# Patient Record
Sex: MALE | Race: WHITE | Employment: OTHER | URBAN - METROPOLITAN AREA
[De-identification: names, ages, dates, MRNs, and addresses within clinical notes are randomized per-mention and may not be internally consistent; named-entity substitution may affect disease eponyms.]

---

## 2019-03-01 ENCOUNTER — HOSPITAL ENCOUNTER (OUTPATIENT)
Dept: LAB | Age: 79
Discharge: HOME | End: 2019-03-01
Payer: MEDICARE

## 2019-03-01 ENCOUNTER — OFFICE VISIT (OUTPATIENT)
Dept: ENDOCRINOLOGY | Age: 79
End: 2019-03-01

## 2019-03-01 VITALS
HEART RATE: 55 BPM | TEMPERATURE: 96.8 F | DIASTOLIC BLOOD PRESSURE: 68 MMHG | HEIGHT: 67 IN | WEIGHT: 189 LBS | OXYGEN SATURATION: 96 % | BODY MASS INDEX: 29.66 KG/M2 | RESPIRATION RATE: 16 BRPM | SYSTOLIC BLOOD PRESSURE: 124 MMHG

## 2019-03-01 DIAGNOSIS — I10 ESSENTIAL HYPERTENSION: ICD-10-CM

## 2019-03-01 DIAGNOSIS — E78.2 MIXED HYPERLIPIDEMIA: ICD-10-CM

## 2019-03-01 DIAGNOSIS — E11.65 TYPE 2 DIABETES MELLITUS WITH HYPERGLYCEMIA, WITHOUT LONG-TERM CURRENT USE OF INSULIN (HCC): Primary | ICD-10-CM

## 2019-03-01 LAB
GLUCOSE POC: 134 MG/DL
HBA1C MFR BLD HPLC: 6.8 %

## 2019-03-01 PROCEDURE — 82043 UR ALBUMIN QUANTITATIVE: CPT

## 2019-03-01 PROCEDURE — 83721 ASSAY OF BLOOD LIPOPROTEIN: CPT

## 2019-03-01 PROCEDURE — 36415 COLL VENOUS BLD VENIPUNCTURE: CPT

## 2019-03-01 PROCEDURE — 80048 BASIC METABOLIC PNL TOTAL CA: CPT

## 2019-03-01 RX ORDER — AMOXICILLIN 500 MG
1 CAPSULE ORAL 3 TIMES DAILY
COMMUNITY
End: 2021-04-09

## 2019-03-01 RX ORDER — METFORMIN HYDROCHLORIDE 500 MG/1
1000 TABLET, FILM COATED, EXTENDED RELEASE ORAL 2 TIMES DAILY
COMMUNITY
End: 2019-07-12 | Stop reason: ALTCHOICE

## 2019-03-01 RX ORDER — BLOOD-GLUCOSE METER
KIT MISCELLANEOUS
Refills: 2 | COMMUNITY
Start: 2018-12-26 | End: 2019-04-24 | Stop reason: SDUPTHER

## 2019-03-01 RX ORDER — ERGOCALCIFEROL 1.25 MG/1
5000 CAPSULE ORAL
COMMUNITY

## 2019-03-01 RX ORDER — LISINOPRIL AND HYDROCHLOROTHIAZIDE 12.5; 2 MG/1; MG/1
TABLET ORAL
Refills: 0 | COMMUNITY
Start: 2019-01-22 | End: 2019-04-24 | Stop reason: SDUPTHER

## 2019-03-01 RX ORDER — DICLOFENAC SODIUM 75 MG/1
TABLET, DELAYED RELEASE ORAL
Refills: 1 | COMMUNITY
Start: 2019-01-10 | End: 2021-03-16 | Stop reason: ALTCHOICE

## 2019-03-01 RX ORDER — EZETIMIBE 10 MG/1
TABLET ORAL
Refills: 1 | COMMUNITY
Start: 2018-12-31 | End: 2021-03-16

## 2019-03-01 RX ORDER — METOPROLOL SUCCINATE 200 MG/1
TABLET, EXTENDED RELEASE ORAL
Refills: 0 | COMMUNITY
Start: 2019-01-22 | End: 2019-04-24 | Stop reason: SDUPTHER

## 2019-03-01 NOTE — PROGRESS NOTES
Ankit Oliveros is a [de-identified] y.o. male here for Chief Complaint Patient presents with  New Patient  
  referred by Dr. Bell Harris for DM Functional glucose monitor and record keeping system? - yes Eye exam within last year? - [facility] Foot exam within last year? - due 1. Have you been to the ER, urgent care clinic since your last visit? Hospitalized since your last visit? -n/a 2. Have you seen or consulted any other health care providers outside of the [address] [place] [address] since your last visit?   Include any pap smears or colon screening.-n/a

## 2019-03-01 NOTE — PROGRESS NOTES
[facility] DIABETES AND ENDOCRINOLOGY Vinicius Mcmahon MD 
 
    [address] [phone] Fax [phone] Patient Information Date:3/3/2019 Name : Марина Edmonds [de-identified] y.o.    
YOB: 1940 Chief Complaint Patient presents with  New Patient DM History of Present Illness: Марина Edmonds is a [de-identified] y.o. male here for initial visit of  Type 2 Diabetes Mellitus. Self-referred for evaluation management of type 2 diabetes mellitus. He was managed by Dr. Michelle Marley in the past 
He is on metformin, was on insulin which he has discontinued after changing the diet. Diabetes was diagnosed in 2015, complains of tingling, pain in the feet. He wants to know if the neuropathy pain he has is directly related to the diabetes to see if we will qualify for more  benefits. He is checking the blood glucose twice daily, maintaining a very good log, no hypoglycemia Due to arthralgia, pain in the feet activity is very limited. Eats 3 meals a day, no particular diet, drinks diet drinks. No cough claudication, chest pain, shortness of breath He has underlying coronary artery disease Weight has been stable Wt Readings from Last 3 Encounters:  
03/01/19 189 lb (85.7 kg) BP Readings from Last 3 Encounters:  
03/01/19 124/68 Past Medical History:  
Diagnosis Date  Cataract  Coronary artery disease  Hyperlipidemia  Hypertension  Neuropathy  Type 2 diabetes mellitus ([facility]  Vitamin D deficiency Current Outpatient Medications Medication Sig  
 ezetimibe (ZETIA) 10 mg tablet TK 1 T PO QD  
 metoprolol succinate (TOPROL-XL) 200 mg XL tablet TK 1 T PO QD  
 lisinopril-hydroCHLOROthiazide (PRINZIDE, ZESTORETIC) 20-12.5 mg per tablet TK 1 T PO QD  
 diclofenac EC (VOLTAREN) 75 mg EC tablet TK 1 T PO BID  FREESTYLE LITE STRIPS strip USE TO TEST BLOOD SUGAR QID  
  metFORMIN (GLUMETZA ER) 500 mg TG24 24 hour tablet Take 1,000 mg by mouth two (2) times a day.  ergocalciferol (VITAMIN D2) 50,000 unit capsule Take 50,000 Units by mouth every seven (7) days.  alip acid/biotin/mins16/herb91 (BLOOD SUGAR BALANCE PO) Take 1 Tab by mouth two (2) times a day.  OTHER Take 1 Tab by mouth two (2) times a day. Proflexoral  
 fish oil-omega-3 fatty acids (FISH OIL) 300-1,000 mg cap Take 1 Cap by mouth three (3) times daily.  OTHER Take 1 Tab by mouth daily. GS-85 No current facility-administered medications for this visit. No Known Allergies Review of Systems:  All 10 systems reviewed and are negative other than mentioned in HPI Physical Examination: 
 Blood pressure 124/68, pulse (!) 55, temperature 96.8 °F (36 °C), temperature source Oral, resp. rate 16, height 5' 7\" (1.702 m), weight 189 lb (85.7 kg), SpO2 96 %. Estimated body mass index is 29.6 kg/m² as calculated from the following: 
  Height as of this encounter: 5' 7\" (1.702 m). -   Weight as of this encounter: 189 lb (85.7 kg). - General: pleasant, no distress, good eye contact 
- HEENT: no pallor, no periorbital edema, EOMI 
- Neck: supple, no thyromegaly, no nodules - Cardiovascular: regular,  normal S1 and S2, no murmurs - Respiratory: clear to auscultation bilaterally - Gastrointestinal: soft, nontender, nondistended,  BS + 
- Musculoskeletal: no proximal muscle weakness in upper or lower extremities - Integumentary: no acanthosis nigricans,no edema,  
- Neurological: alert and oriented - Psychiatric: normal mood and affect 
- Skin: color, texture, turgor normal.  
 
Diabetic foot exam: March 2019 Left Foot: 
 Visual Exam: callous - Great toe Pulse DP: 1+ (weak) Filament test: reduced sensation Vibratory sensation: diminished Right Foot: 
 Visual Exam: normal  
 Pulse DP: 1+ (weak) Filament test: reduced sensation Vibratory sensation: diminished Data Reviewed: Lab Results Component Value Date/Time Glucose 119 (H) 03/01/2019 02:13 PM  
 Glucose  03/01/2019 01:31 PM  
 Microalb/Creat ratio (ug/mg creat.) 9.4 03/01/2019 02:13 PM  
 LDL,Direct 110 (H) 03/01/2019 02:13 PM  
 Creatinine 1.57 (H) 03/01/2019 02:13 PM  
  
Lab Results Component Value Date/Time GFR est non-AA 41 (L) 03/01/2019 02:13 PM  
 GFR est AA 47 (L) 03/01/2019 02:13 PM  
 Creatinine 1.57 (H) 03/01/2019 02:13 PM  
 BUN 27 03/01/2019 02:13 PM  
 Sodium 142 03/01/2019 02:13 PM  
 Potassium 5.5 (H) 03/01/2019 02:13 PM  
 Chloride 109 (H) 03/01/2019 02:13 PM  
 CO2 22 03/01/2019 02:13 PM  
 
 
Assessment/Plan: 1. Type 2 diabetes mellitus with hyperglycemia, without long-term current use of insulin (HCC) 2. Essential hypertension 3. Mixed hyperlipidemia 1. Type 2 Diabetes Mellitus Lab Results Component Value Date/Time Hemoglobin A1c (POC) 6.8 03/01/2019 01:31 PM  
 
Controlled diabetes, continue metformin. Check blood glucose once daily Diabetic issues reviewed : glycemic goals , written exchange diet given, low carbohydrate diet, weight control , home glucose monitoring emphasized,  hypoglycemia management and long term diabetic complications discussed. FLU annually ,Pneumovax ,aspirin daily,annual eye exam,microalbumin 2. HTN : Continue current therapy 3. Hyperlipidemia : Continue statin. 4.  CAD No calf claudication 5. Peripheral neuropathy: He wants to know conclusively if neuropathy is related to the diabetes, referto neurology There are no Patient Instructions on file for this visit. Follow-up Disposition: 
Return in about 4 months (around 7/1/2019). Thank you for allowing me to participate in the care of this patient. Nader Roche MD 
 
 
Patient verbalized understanding Voice-recognition software was used to generate this report, which may result in some phonetic-based errors in the grammar and contents.   Even though attempts were made to correct all the mistakes, some may have been missed and remained in the body of the report.

## 2019-03-02 LAB
ALBUMIN/CREAT UR: 9.4 MG/G CREAT (ref 0–30)
BUN SERPL-MCNC: 27 MG/DL (ref 8–27)
BUN/CREAT SERPL: 17 (ref 10–24)
CALCIUM SERPL-MCNC: 9.5 MG/DL (ref 8.6–10.2)
CHLORIDE SERPL-SCNC: 109 MMOL/L (ref 96–106)
CO2 SERPL-SCNC: 22 MMOL/L (ref 20–29)
CREAT SERPL-MCNC: 1.57 MG/DL (ref 0.76–1.27)
CREAT UR-MCNC: 478.6 MG/DL
GLUCOSE SERPL-MCNC: 119 MG/DL (ref 65–99)
INTERPRETATION: NORMAL
LDLC SERPL DIRECT ASSAY-MCNC: 110 MG/DL (ref 0–99)
Lab: NORMAL
MICROALBUMIN UR-MCNC: 44.8 UG/ML
POTASSIUM SERPL-SCNC: 5.5 MMOL/L (ref 3.5–5.2)
SODIUM SERPL-SCNC: 142 MMOL/L (ref 134–144)

## 2019-03-03 PROBLEM — E78.2 MIXED HYPERLIPIDEMIA: Status: ACTIVE | Noted: 2019-03-03

## 2019-03-03 PROBLEM — I10 ESSENTIAL HYPERTENSION: Status: ACTIVE | Noted: 2019-03-03

## 2019-03-03 PROBLEM — E11.65 TYPE 2 DIABETES MELLITUS WITH HYPERGLYCEMIA, WITHOUT LONG-TERM CURRENT USE OF INSULIN (HCC): Status: ACTIVE | Noted: 2019-03-03

## 2019-04-24 DIAGNOSIS — E11.65 TYPE 2 DIABETES MELLITUS WITH HYPERGLYCEMIA, WITHOUT LONG-TERM CURRENT USE OF INSULIN (HCC): Primary | ICD-10-CM

## 2019-04-24 RX ORDER — BLOOD-GLUCOSE METER
KIT MISCELLANEOUS
Qty: 300 STRIP | Refills: 3 | Status: SHIPPED | OUTPATIENT
Start: 2019-04-24 | End: 2019-05-24 | Stop reason: SDUPTHER

## 2019-04-24 RX ORDER — METOPROLOL SUCCINATE 200 MG/1
200 TABLET, EXTENDED RELEASE ORAL DAILY
Qty: 90 TAB | Refills: 3 | Status: SHIPPED | OUTPATIENT
Start: 2019-04-24 | End: 2019-11-18 | Stop reason: SDUPTHER

## 2019-04-24 RX ORDER — LISINOPRIL AND HYDROCHLOROTHIAZIDE 12.5; 2 MG/1; MG/1
TABLET ORAL
Qty: 90 TAB | Refills: 3 | Status: SHIPPED | OUTPATIENT
Start: 2019-04-24 | End: 2020-04-27

## 2019-04-24 NOTE — TELEPHONE ENCOUNTER
Informed pt that we will send test strips to pharmacy and that he would have to get BP meds from PCP. Pt stated his old Endo was Prescribing his BP meds not his PCP. Pt is running out of meds in the next couple of days. Informed him Dr. Rosanne Cain is out of the office but I will see if covering physician will send it in. Pt verbalized understanding with no further questions or concerns at this time.

## 2019-05-14 RX ORDER — METFORMIN HYDROCHLORIDE 500 MG/1
TABLET, EXTENDED RELEASE ORAL
Qty: 360 TAB | Refills: 0 | Status: SHIPPED | OUTPATIENT
Start: 2019-05-14 | End: 2019-08-12 | Stop reason: SDUPTHER

## 2019-05-24 ENCOUNTER — TELEPHONE (OUTPATIENT)
Dept: ENDOCRINOLOGY | Age: 79
End: 2019-05-24

## 2019-05-24 DIAGNOSIS — E11.65 TYPE 2 DIABETES MELLITUS WITH HYPERGLYCEMIA, WITHOUT LONG-TERM CURRENT USE OF INSULIN (HCC): Primary | ICD-10-CM

## 2019-05-24 RX ORDER — INSULIN PUMP SYRINGE, 3 ML
EACH MISCELLANEOUS
Qty: 1 KIT | Refills: 0 | Status: SHIPPED | OUTPATIENT
Start: 2019-05-24 | End: 2022-04-05

## 2019-05-24 NOTE — TELEPHONE ENCOUNTER
Patient is almost out of test strips. He should be testing 2 times qday now down to 1 qday because he does not have enough strips.

## 2019-05-24 NOTE — TELEPHONE ENCOUNTER
Informed pt he has to call insurance to see what is covered. Pt states he does not know why Freestyle isn't covered and he doesn't know who to call. Informed pt that a generic Rx can be sent and will ask pharmacy to dispense what is covered. Pt verbalized understanding.

## 2019-07-11 NOTE — PROGRESS NOTES
Brielle Kamara is a [de-identified] y.o. male here for   Chief Complaint   Patient presents with    Diabetes       Functional glucose monitor and record keeping system? - yes  Eye exam within last year? - [facility]  Foot exam within last year? - on file    1. Have you been to the ER, urgent care clinic since your last visit? Hospitalized since your last visit? -no    2. Have you seen or consulted any other health care providers outside of the [address] [place] [address] since your last visit?   Include any pap smears or colon screening.-

## 2019-07-12 ENCOUNTER — OFFICE VISIT (OUTPATIENT)
Dept: ENDOCRINOLOGY | Age: 79
End: 2019-07-12

## 2019-07-12 VITALS
SYSTOLIC BLOOD PRESSURE: 139 MMHG | OXYGEN SATURATION: 97 % | BODY MASS INDEX: 30.13 KG/M2 | HEART RATE: 62 BPM | RESPIRATION RATE: 14 BRPM | TEMPERATURE: 95.6 F | WEIGHT: 192 LBS | DIASTOLIC BLOOD PRESSURE: 65 MMHG | HEIGHT: 67 IN

## 2019-07-12 DIAGNOSIS — E11.65 TYPE 2 DIABETES MELLITUS WITH HYPERGLYCEMIA, WITHOUT LONG-TERM CURRENT USE OF INSULIN (HCC): ICD-10-CM

## 2019-07-12 DIAGNOSIS — E78.2 MIXED HYPERLIPIDEMIA: ICD-10-CM

## 2019-07-12 DIAGNOSIS — I10 ESSENTIAL HYPERTENSION: ICD-10-CM

## 2019-07-12 DIAGNOSIS — E11.65 TYPE 2 DIABETES MELLITUS WITH HYPERGLYCEMIA, WITHOUT LONG-TERM CURRENT USE OF INSULIN (HCC): Primary | ICD-10-CM

## 2019-07-12 LAB
GLUCOSE POC: 163 MG/DL
HBA1C MFR BLD HPLC: 6.4 %

## 2019-07-12 NOTE — LETTER
7/13/19 Patient: Radha Qureshi YOB: 1940 Date of Visit: 7/12/2019 Chadwick Meneses, [address] [facility] [address] [address] [facility] [address] VIA Facsimile: [phone] Dear Chadwick Meneses MD, Thank you for referring Mr. Jerry Morton to [address] [address] for evaluation. My notes for this consultation are attached. If you have questions, please do not hesitate to call me. I look forward to following your patient along with you. Sincerely, Mary Gonzalez MD

## 2019-07-12 NOTE — PROGRESS NOTES
[facility]               Meghana Bravo MD        [address] 78 [phone] Fax [phone]               Patient Information  Date:7/12/2019  Name : Marcus Lozano [de-identified] y.o.     YOB: 1940               Chief Complaint   Patient presents with    Diabetes       History of Present Illness: Marcus Lozano is a [de-identified] y.o. male here for follow-up of  Type 2 Diabetes Mellitus. Self-referred for evaluation management of type 2 diabetes mellitus. He was managed by Dr. Bryan Cyr in the past  He is on metformin, was on insulin which he has discontinued after changing the diet. Diabetes was diagnosed in 2015, complains of tingling, pain in the feet. He wants to know if the neuropathy pain he has is directly related to the diabetes to see if we will qualify for more  benefits. Would like a referral to a neurologist      Eats 3 meals a day,    Wt Readings from Last 3 Encounters:   07/12/19 192 lb (87.1 kg)   03/01/19 189 lb (85.7 kg)       BP Readings from Last 3 Encounters:   07/12/19 139/65   03/01/19 124/68           Past Medical History:   Diagnosis Date    Cataract     Coronary artery disease     Hyperlipidemia     Hypertension     Neuropathy     [facility]     Vitamin D deficiency      Current Outpatient Medications   Medication Sig    Blood-Glucose Meter monitoring kit Test BID Dx Code: E11.65    glucose blood VI test strips (BLOOD GLUCOSE TEST) strip USE TO TEST BLOOD SUGAR BID. Dx code: E11.65    metFORMIN ER (GLUCOPHAGE XR) 500 mg tablet TAKE 2 TABLETS BY MOUTH TWICE DAILY.  lisinopril-hydroCHLOROthiazide (PRINZIDE, ZESTORETIC) 20-12.5 mg per tablet Take 1 tab by mouth daily    metoprolol succinate (TOPROL-XL) 200 mg XL tablet Take 1 Tab by mouth daily.     ezetimibe (ZETIA) 10 mg tablet TK 1 T PO QD    diclofenac EC (VOLTAREN) 75 mg EC tablet TK 1 T PO BID    metFORMIN (GLUMETZA ER) 500 mg TG24 24 hour tablet Take 1,000 mg by mouth two (2) times a day.  ergocalciferol (VITAMIN D2) 50,000 unit capsule Take 50,000 Units by mouth every seven (7) days.  alip acid/biotin/mins16/herb91 (BLOOD SUGAR BALANCE PO) Take 1 Tab by mouth two (2) times a day.  OTHER Take 1 Tab by mouth two (2) times a day. Proflexoral    fish oil-omega-3 fatty acids (FISH OIL) 300-1,000 mg cap Take 1 Cap by mouth three (3) times daily.  OTHER Take 1 Tab by mouth daily. GS-85     No current facility-administered medications for this visit. No Known Allergies    Review of Systems:  All 10 systems reviewed and are negative other than mentioned in HPI    Physical Examination:   Blood pressure 139/65, pulse 62, temperature 95.6 °F (35.3 °C), temperature source Oral, resp. rate 14, height 5' 7\" (1.702 m), weight 192 lb (87.1 kg), SpO2 97 %. Estimated body mass index is 30.07 kg/m² as calculated from the following:    Height as of this encounter: 5' 7\" (1.702 m). -   Weight as of this encounter: 192 lb (87.1 kg).   - General: pleasant, no distress, good eye contact  - HEENT: no pallor, no periorbital edema, EOMI  - Neck: supple,  - Cardiovascular: regular,  normal S1 and S2,  - Respiratory: clear to auscultation bilaterally  - Gastrointestinal: soft, nontender, nondistended,  BS +  - Musculoskeletal: no proximal muscle weakness in upper or lower extremities  -   - Neurological: alert and oriented  - Psychiatric: normal mood and affect  - Skin: color, texture, turgor normal.     Diabetic foot exam: March 2019    Left Foot:   Visual Exam: callous - Great toe   Pulse DP: 1+ (weak)   Filament test: reduced sensation    Vibratory sensation: diminished      Right Foot:   Visual Exam: normal    Pulse DP: 1+ (weak)   Filament test: reduced sensation    Vibratory sensation: diminished      Data Reviewed:       Lab Results   Component Value Date/Time    Glucose 119 (H) 03/01/2019 02:13 PM    Glucose  07/12/2019 01:06 PM Microalb/Creat ratio (ug/mg creat.) 9.4 03/01/2019 02:13 PM    LDL,Direct 110 (H) 03/01/2019 02:13 PM    Creatinine 1.57 (H) 03/01/2019 02:13 PM      Lab Results   Component Value Date/Time    GFR est non-AA 41 (L) 03/01/2019 02:13 PM    GFR est AA 47 (L) 03/01/2019 02:13 PM    Creatinine 1.57 (H) 03/01/2019 02:13 PM    BUN 27 03/01/2019 02:13 PM    Sodium 142 03/01/2019 02:13 PM    Potassium 5.5 (H) 03/01/2019 02:13 PM    Chloride 109 (H) 03/01/2019 02:13 PM    CO2 22 03/01/2019 02:13 PM       Assessment/Plan:     1. Type 2 diabetes mellitus with hyperglycemia, without long-term current use of insulin ([address]    2. Essential hypertension    3. Mixed hyperlipidemia        1. Type 2 Diabetes Mellitus   Lab Results   Component Value Date/Time    Hemoglobin A1c (POC) 6.4 07/12/2019 01:07 PM     Controlled diabetes, continue metformin. Diabetic issues reviewed : glycemic goals , written exchange diet given, low carbohydrate diet, weight control , home glucose monitoring emphasized,  hypoglycemia management and long term diabetic complications discussed. FLU annually ,Pneumovax ,aspirin daily,annual eye exam,microalbumin    2. HTN : Continue current therapy     3. Hyperlipidemia : Continue statin. 4.  CAD    No calf claudication    5. Peripheral neuropathy: He wants to know conclusively if neuropathy is related to the diabetes so he can apply for more  benefits, refer to neurology    There are no Patient Instructions on file for this visit. Thank you for allowing me to participate in the care of this patient. Hugo Ashley MD      Patient verbalized understanding     Voice-recognition software was used to generate this report, which may result in some phonetic-based errors in the grammar and contents. Even though attempts were made to correct all the mistakes, some may have been missed and remained in the body of the report.

## 2019-07-23 LAB
CREATININE, EXTERNAL: 1.37
LDL-C, EXTERNAL: 67

## 2019-08-12 RX ORDER — METFORMIN HYDROCHLORIDE 500 MG/1
TABLET, EXTENDED RELEASE ORAL
Qty: 360 TAB | Refills: 0 | Status: SHIPPED | OUTPATIENT
Start: 2019-08-12 | End: 2019-11-06 | Stop reason: SDUPTHER

## 2019-10-28 LAB — HBA1C MFR BLD HPLC: 6.9 %

## 2019-11-06 RX ORDER — METFORMIN HYDROCHLORIDE 500 MG/1
TABLET, EXTENDED RELEASE ORAL
Qty: 360 TAB | Refills: 0 | Status: SHIPPED | OUTPATIENT
Start: 2019-11-06 | End: 2020-12-23

## 2019-11-18 ENCOUNTER — OFFICE VISIT (OUTPATIENT)
Dept: ENDOCRINOLOGY | Age: 79
End: 2019-11-18

## 2019-11-18 VITALS
OXYGEN SATURATION: 96 % | SYSTOLIC BLOOD PRESSURE: 121 MMHG | HEIGHT: 67 IN | HEART RATE: 61 BPM | BODY MASS INDEX: 29.82 KG/M2 | RESPIRATION RATE: 18 BRPM | DIASTOLIC BLOOD PRESSURE: 66 MMHG | WEIGHT: 190 LBS | TEMPERATURE: 95.9 F

## 2019-11-18 DIAGNOSIS — E11.65 TYPE 2 DIABETES MELLITUS WITH HYPERGLYCEMIA, WITHOUT LONG-TERM CURRENT USE OF INSULIN (HCC): ICD-10-CM

## 2019-11-18 DIAGNOSIS — I10 ESSENTIAL HYPERTENSION: ICD-10-CM

## 2019-11-18 DIAGNOSIS — E78.2 MIXED HYPERLIPIDEMIA: ICD-10-CM

## 2019-11-18 DIAGNOSIS — E11.65 TYPE 2 DIABETES MELLITUS WITH HYPERGLYCEMIA, WITHOUT LONG-TERM CURRENT USE OF INSULIN (HCC): Primary | ICD-10-CM

## 2019-11-18 RX ORDER — METOPROLOL SUCCINATE 200 MG/1
200 TABLET, EXTENDED RELEASE ORAL DAILY
Qty: 90 TAB | Refills: 3 | Status: SHIPPED | OUTPATIENT
Start: 2019-11-18 | End: 2021-12-06

## 2019-11-18 NOTE — PROGRESS NOTES
Danette Gonzalez MD                  Patient Information  Date:11/18/2019  Name : Loren Velarde 78 y.o.     YOB: 1940               Chief Complaint   Patient presents with    Diabetes       History of Present Illness: Loren Velarde is a 78 y.o. male here for follow-up of  Type 2 Diabetes Mellitus. Self-referred for evaluation management of type 2 diabetes mellitus. He was managed by Dr. Alma Galicia in the past  He is on metformin, was on insulin which he has discontinued after changing the diet. Diabetes was diagnosed in 2015, complains of tingling, pain in the feet. He wants to know if the neuropathy pain he has is directly related to the diabetes to see if we will qualify for more  benefits. He was referred to neurology but he did not get an appointment, PCP has referred him to Dr. Vianney Reynolds and has the appointment soon       Eats 3 meals a day,    Wt Readings from Last 3 Encounters:   11/18/19 190 lb (86.2 kg)   07/12/19 192 lb (87.1 kg)   03/01/19 189 lb (85.7 kg)       BP Readings from Last 3 Encounters:   11/18/19 121/66   07/12/19 139/65   03/01/19 124/68           Past Medical History:   Diagnosis Date    Cataract     Coronary artery disease     Hyperlipidemia     Hypertension     Neuropathy     Type 2 diabetes mellitus (HCC)     Vitamin D deficiency      Current Outpatient Medications   Medication Sig    metFORMIN ER (GLUCOPHAGE XR) 500 mg tablet TAKE 2 TABLETS BY MOUTH TWICE DAILY    glucose blood VI test strips (BLOOD GLUCOSE TEST) strip USE TO TEST BLOOD SUGAR BID. Dx code: E11.65    Blood-Glucose Meter monitoring kit Test BID Dx Code: E11.65    lisinopril-hydroCHLOROthiazide (PRINZIDE, ZESTORETIC) 20-12.5 mg per tablet Take 1 tab by mouth daily    metoprolol succinate (TOPROL-XL) 200 mg XL tablet Take 1 Tab by mouth daily.     ezetimibe (ZETIA) 10 mg tablet TK 1 T PO QD    diclofenac EC (VOLTAREN) 75 mg EC tablet TK 1 T PO BID    ergocalciferol (VITAMIN D2) 50,000 unit capsule Take 5,000 Units by mouth daily.  alip acid/biotin/mins16/herb91 (BLOOD SUGAR BALANCE PO) Take 1 Tab by mouth daily.  OTHER Take 1 Tab by mouth two (2) times a day. Proflexoral    fish oil-omega-3 fatty acids (FISH OIL) 300-1,000 mg cap Take 1 Cap by mouth three (3) times daily.  OTHER Take 1 Tab by mouth daily. GS-85     No current facility-administered medications for this visit. No Known Allergies    Review of Systems:  All 10 systems reviewed and are negative other than mentioned in HPI    Physical Examination:   Blood pressure 121/66, pulse 61, temperature 95.9 °F (35.5 °C), temperature source Oral, resp. rate 18, height 5' 7\" (1.702 m), weight 190 lb (86.2 kg), SpO2 96 %. Estimated body mass index is 29.76 kg/m² as calculated from the following:    Height as of this encounter: 5' 7\" (1.702 m). -   Weight as of this encounter: 190 lb (86.2 kg).   - General: pleasant, no distress, good eye contact  - HEENT: no pallor, no periorbital edema, EOMI  - Neck: supple,  - Cardiovascular: regular,  normal S1 and S2,  - Respiratory: clear to auscultation bilaterally  - Gastrointestinal: soft, nontender, nondistended,  BS +  - Musculoskeletal: no proximal muscle weakness in upper or lower extremities  -   - Neurological: alert and oriented  - Psychiatric: normal mood and affect  - Skin: color, texture, turgor normal.     Diabetic foot exam: March 2019    Left Foot:   Visual Exam: callous - Great toe   Pulse DP: 1+ (weak)   Filament test: reduced sensation    Vibratory sensation: diminished      Right Foot:   Visual Exam: normal    Pulse DP: 1+ (weak)   Filament test: reduced sensation    Vibratory sensation: diminished      Data Reviewed:       Lab Results   Component Value Date/Time    Hemoglobin A1c, External 6.9 10/29/2019    Glucose 119 (H) 03/01/2019 02:13 PM    Glucose  07/12/2019 01:06 PM    Microalb/Creat ratio (ug/mg creat.) 9.4 03/01/2019 02:13 PM    LDL,Direct 110 (H) 03/01/2019 02:13 PM    Creatinine 1.57 (H) 03/01/2019 02:13 PM      Lab Results   Component Value Date/Time    GFR est non-AA 41 (L) 03/01/2019 02:13 PM    GFR est AA 47 (L) 03/01/2019 02:13 PM    Creatinine 1.57 (H) 03/01/2019 02:13 PM    BUN 27 03/01/2019 02:13 PM    Sodium 142 03/01/2019 02:13 PM    Potassium 5.5 (H) 03/01/2019 02:13 PM    Chloride 109 (H) 03/01/2019 02:13 PM    CO2 22 03/01/2019 02:13 PM       Assessment/Plan:     1. Type 2 diabetes mellitus with hyperglycemia, without long-term current use of insulin ([facility]    2. Essential hypertension    3. Mixed hyperlipidemia        1. Type 2 Diabetes Mellitus   Lab Results   Component Value Date/Time    Hemoglobin A1c (POC) 6.4 07/12/2019 01:07 PM    Hemoglobin A1c, External 6.9 10/29/2019     Controlled diabetes, continue metformin. Diabetic issues reviewed : glycemic goals , written exchange diet given, low carbohydrate diet, weight control , home glucose monitoring emphasized,  hypoglycemia management and long term diabetic complications discussed. FLU annually ,Pneumovax ,aspirin daily,annual eye exam,microalbumin    2. HTN : Continue current therapy     3. Hyperlipidemia : Continue statin. 4.  CAD  No calf claudication    5. Peripheral neuropathy: He wants to know conclusively if neuropathy is related to the diabetes so he can apply for more  benefits, has an appointment with neurology  B12       There are no Patient Instructions on file for this visit. Thank you for allowing me to participate in the care of this patient. Rafat Rankin MD      Patient verbalized understanding     Voice-recognition software was used to generate this report, which may result in some phonetic-based errors in the grammar and contents. Even though attempts were made to correct all the mistakes, some may have been missed and remained in the body of the report.

## 2019-11-18 NOTE — LETTER
11/19/19 Patient: Kevin Bass YOB: 1940 Date of Visit: 11/18/2019 Eitan Liu, [address] [address] [place] [address] VIA Facsimile: [phone] Dear Eitan Liu MD, Thank you for referring Mr. Roger Eid to [id number] [place] [address] for evaluation. My notes for this consultation are attached. If you have questions, please do not hesitate to call me. I look forward to following your patient along with you. Sincerely, Mejia Biswas MD

## 2019-11-18 NOTE — PROGRESS NOTES
Guerita Millan is a 78 y.o. male here for   Chief Complaint   Patient presents with    Diabetes       Functional glucose monitor and record keeping system? -yes   Eye exam within last year? - [facility]  Foot exam within last year? -on file     1. Have you been to the ER, urgent care clinic since your last visit? Hospitalized since your last visit? -no    2. Have you seen or consulted any other health care providers outside of the [address] [place] [address] since your last visit? Include any pap smears or colon screening. -PCP

## 2019-12-19 ENCOUNTER — TELEPHONE (OUTPATIENT)
Dept: ENDOCRINOLOGY | Age: 79
End: 2019-12-19

## 2019-12-19 DIAGNOSIS — E11.65 TYPE 2 DIABETES MELLITUS WITH HYPERGLYCEMIA, WITHOUT LONG-TERM CURRENT USE OF INSULIN (HCC): Primary | ICD-10-CM

## 2019-12-19 NOTE — TELEPHONE ENCOUNTER
I will give him the option of Januvia 50 mg in the morning which is a pill, instead of going on insulin  The other option is Lantus 10 units at bedtime

## 2019-12-19 NOTE — TELEPHONE ENCOUNTER
Pt stated he used to be on Lantus, but it was stopped. He's been watching his BG the last few months and it has been over 150 in the AM. He thinks he needs to go back on the Lantus. He's currently only on Metformin  mg 2 tabs BID for DM. No signs of infection/sickness. AM BG last 3 days 174, 143, 152.

## 2019-12-19 NOTE — TELEPHONE ENCOUNTER
Please call patient he would like to go on a medication for his diabetes that I didn't see on his med list. Thank you

## 2019-12-20 RX ORDER — INSULIN GLARGINE 100 [IU]/ML
10 INJECTION, SOLUTION SUBCUTANEOUS
Qty: 15 ML | Refills: 3 | Status: SHIPPED | OUTPATIENT
Start: 2019-12-20 | End: 2020-12-22

## 2019-12-20 RX ORDER — PEN NEEDLE, DIABETIC 31 GX3/16"
NEEDLE, DISPOSABLE MISCELLANEOUS
Qty: 100 PEN NEEDLE | Refills: 3 | Status: SHIPPED | OUTPATIENT
Start: 2019-12-20

## 2019-12-20 NOTE — TELEPHONE ENCOUNTER
Informed pt of Dr. Reji Anderson note. Pt verbalized understanding and would prefer the insulin. No further questions or concerns at this time.

## 2020-03-16 ENCOUNTER — HOSPITAL ENCOUNTER (OUTPATIENT)
Dept: LAB | Age: 80
Discharge: HOME | End: 2020-03-16

## 2020-03-16 ENCOUNTER — APPOINTMENT (OUTPATIENT)
Dept: ENDOCRINOLOGY | Age: 80
End: 2020-03-16

## 2020-03-16 DIAGNOSIS — E11.65 TYPE 2 DIABETES MELLITUS WITH HYPERGLYCEMIA, WITHOUT LONG-TERM CURRENT USE OF INSULIN (HCC): ICD-10-CM

## 2020-03-16 DIAGNOSIS — I10 ESSENTIAL HYPERTENSION: ICD-10-CM

## 2020-03-16 DIAGNOSIS — E78.2 MIXED HYPERLIPIDEMIA: ICD-10-CM

## 2020-03-16 LAB
ALBUMIN SERPL-MCNC: 3.7 G/DL (ref 3.5–5)
ALBUMIN/GLOB SERPL: 1.1 {RATIO} (ref 1.1–2.2)
ALP SERPL-CCNC: 66 U/L (ref 45–117)
ALT SERPL-CCNC: 27 U/L (ref 12–78)
ANION GAP SERPL CALC-SCNC: 6 MMOL/L (ref 5–15)
AST SERPL-CCNC: 14 U/L (ref 15–37)
BILIRUB SERPL-MCNC: 0.6 MG/DL (ref 0.2–1)
BUN SERPL-MCNC: 22 MG/DL (ref 6–20)
BUN/CREAT SERPL: 12 (ref 12–20)
CALCIUM SERPL-MCNC: 9.1 MG/DL (ref 8.5–10.1)
CHLORIDE SERPL-SCNC: 105 MMOL/L (ref 97–108)
CO2 SERPL-SCNC: 28 MMOL/L (ref 21–32)
CREAT SERPL-MCNC: 1.81 MG/DL (ref 0.7–1.3)
GLOBULIN SER CALC-MCNC: 3.4 G/DL (ref 2–4)
GLUCOSE SERPL-MCNC: 143 MG/DL (ref 65–100)
LDLC SERPL DIRECT ASSAY-MCNC: 118 MG/DL (ref 0–100)
POTASSIUM SERPL-SCNC: 4.8 MMOL/L (ref 3.5–5.1)
PROT SERPL-MCNC: 7.1 G/DL (ref 6.4–8.2)
SODIUM SERPL-SCNC: 139 MMOL/L (ref 136–145)

## 2020-03-17 LAB
EST. AVERAGE GLUCOSE BLD GHB EST-MCNC: 146 MG/DL
HBA1C MFR BLD: 6.7 % (ref 4–5.6)

## 2020-03-18 ENCOUNTER — TELEPHONE (OUTPATIENT)
Dept: ENDOCRINOLOGY | Age: 80
End: 2020-03-18

## 2020-03-18 NOTE — TELEPHONE ENCOUNTER
Pt states he had a test done recently at neurologist office and it showed his neuropathy is related to DM. He states he will be reapplying for [employer] compensation. Pt states he will have a copy of the neurology results sent to office.

## 2020-04-25 DIAGNOSIS — E11.65 TYPE 2 DIABETES MELLITUS WITH HYPERGLYCEMIA, WITHOUT LONG-TERM CURRENT USE OF INSULIN (HCC): ICD-10-CM

## 2020-04-27 RX ORDER — LISINOPRIL AND HYDROCHLOROTHIAZIDE 12.5; 2 MG/1; MG/1
TABLET ORAL
Qty: 90 TAB | Refills: 3 | Status: SHIPPED | OUTPATIENT
Start: 2020-04-27 | End: 2021-03-15 | Stop reason: SDUPTHER

## 2020-06-19 DIAGNOSIS — E11.65 TYPE 2 DIABETES MELLITUS WITH HYPERGLYCEMIA, WITHOUT LONG-TERM CURRENT USE OF INSULIN (HCC): ICD-10-CM

## 2020-06-19 RX ORDER — BLOOD SUGAR DIAGNOSTIC
STRIP MISCELLANEOUS
Qty: 100 STRIP | Refills: 5 | Status: SHIPPED | OUTPATIENT
Start: 2020-06-19 | End: 2021-11-05

## 2020-07-29 PROBLEM — E55.9 VITAMIN D DEFICIENCY: Status: ACTIVE | Noted: 2020-07-29

## 2020-07-29 PROBLEM — G60.9 HEREDITARY AND IDIOPATHIC NEUROPATHY, UNSPECIFIED: Status: ACTIVE | Noted: 2020-07-29

## 2020-10-09 ENCOUNTER — OFFICE VISIT (OUTPATIENT)
Dept: ENDOCRINOLOGY | Age: 80
End: 2020-10-09
Payer: MEDICARE

## 2020-10-09 VITALS
RESPIRATION RATE: 14 BRPM | BODY MASS INDEX: 29.19 KG/M2 | DIASTOLIC BLOOD PRESSURE: 91 MMHG | TEMPERATURE: 97.6 F | WEIGHT: 186 LBS | HEART RATE: 129 BPM | HEIGHT: 67 IN | SYSTOLIC BLOOD PRESSURE: 138 MMHG

## 2020-10-09 DIAGNOSIS — E11.65 TYPE 2 DIABETES MELLITUS WITH HYPERGLYCEMIA, WITHOUT LONG-TERM CURRENT USE OF INSULIN (HCC): Primary | ICD-10-CM

## 2020-10-09 DIAGNOSIS — I10 ESSENTIAL HYPERTENSION: ICD-10-CM

## 2020-10-09 DIAGNOSIS — E78.2 MIXED HYPERLIPIDEMIA: ICD-10-CM

## 2020-10-09 DIAGNOSIS — G62.9 POLYNEUROPATHY, UNSPECIFIED: ICD-10-CM

## 2020-10-09 LAB — HBA1C MFR BLD HPLC: 6.6 %

## 2020-10-09 PROCEDURE — G8419 CALC BMI OUT NRM PARAM NOF/U: HCPCS | Performed by: INTERNAL MEDICINE

## 2020-10-09 PROCEDURE — 99214 OFFICE O/P EST MOD 30 MIN: CPT | Performed by: INTERNAL MEDICINE

## 2020-10-09 PROCEDURE — G8510 SCR DEP NEG, NO PLAN REQD: HCPCS | Performed by: INTERNAL MEDICINE

## 2020-10-09 PROCEDURE — G8752 SYS BP LESS 140: HCPCS | Performed by: INTERNAL MEDICINE

## 2020-10-09 PROCEDURE — 83036 HEMOGLOBIN GLYCOSYLATED A1C: CPT | Performed by: INTERNAL MEDICINE

## 2020-10-09 PROCEDURE — 1101F PT FALLS ASSESS-DOCD LE1/YR: CPT | Performed by: INTERNAL MEDICINE

## 2020-10-09 PROCEDURE — G8427 DOCREV CUR MEDS BY ELIG CLIN: HCPCS | Performed by: INTERNAL MEDICINE

## 2020-10-09 PROCEDURE — G8536 NO DOC ELDER MAL SCRN: HCPCS | Performed by: INTERNAL MEDICINE

## 2020-10-09 PROCEDURE — G8755 DIAS BP > OR = 90: HCPCS | Performed by: INTERNAL MEDICINE

## 2020-10-09 NOTE — PROGRESS NOTES
Amada Brito MD                  Patient Information  Date:10/9/2020  Name : Adama Solorio 78 y.o.     YOB: 1940               Chief Complaint   Patient presents with    Diabetes       History of Present Illness: Adama Solorio is a 78 y.o. male here for follow-up of  Type 2 Diabetes Mellitus. Self-referred for evaluation management of type 2 diabetes mellitus. He was managed by Dr. Kulwant Quick in the past  He is on metformin,   Diabetes was diagnosed in 2015, complains of tingling, pain in the feet. Evaluated by Dr. Norine Sandhoff: Has peripheral neuropathy, sciatic radiculopathy, left leg weakness  Qualified for extra benefits based on  disability benefits  He is on insulin  No hypoglycemia        Eats 3 meals a day,    Wt Readings from Last 3 Encounters:   10/09/20 186 lb (84.4 kg)   11/18/19 190 lb (86.2 kg)   07/12/19 192 lb (87.1 kg)       BP Readings from Last 3 Encounters:   10/09/20 (!) 138/91   11/18/19 121/66   07/12/19 139/65           Past Medical History:   Diagnosis Date    Cataract     Coronary artery disease     Hyperlipidemia     Hypertension     Neuropathy     Type 2 diabetes mellitus (HCC)     Vitamin D deficiency      Current Outpatient Medications   Medication Sig    lisinopril-hydroCHLOROthiazide (PRINZIDE, ZESTORETIC) 20-12.5 mg per tablet TAKE 1 TABLET BY MOUTH DAILY    insulin glargine (LANTUS SOLOSTAR U-100 INSULIN) 100 unit/mL (3 mL) inpn 10 Units by SubCUTAneous route nightly.  metoprolol succinate (TOPROL-XL) 200 mg XL tablet Take 1 Tab by mouth daily.  metFORMIN ER (GLUCOPHAGE XR) 500 mg tablet TAKE 2 TABLETS BY MOUTH TWICE DAILY    ezetimibe (ZETIA) 10 mg tablet TK 1 T PO QD    diclofenac EC (VOLTAREN) 75 mg EC tablet TK 1 T PO BID    ergocalciferol (VITAMIN D2) 50,000 unit capsule Take 5,000 Units by mouth daily.     alip acid/biotin/mins16/herb91 (BLOOD SUGAR BALANCE PO) Take 1 Tab by mouth daily.    OTHER Take 1 Tab by mouth two (2) times a day. Proflexoral    fish oil-omega-3 fatty acids (FISH OIL) 300-1,000 mg cap Take 1 Cap by mouth three (3) times daily.  glucose blood VI test strips (Accu-Chek Patricia Plus test strp) strip USE TO TEST BLOOD SUGAR TWICE DAILY    Insulin Needles, Disposable, (ALICE PEN NEEDLE) 32 gauge x 5/32\" ndle Use to inject Lantus nightly. DX Code: E11.65    Blood-Glucose Meter monitoring kit Test BID Dx Code: E11.65    OTHER Take 1 Tab by mouth daily. GS-85     No current facility-administered medications for this visit. No Known Allergies    Review of Systems:  All 10 systems reviewed and are negative other than mentioned in HPI    Physical Examination:   Blood pressure (!) 138/91, pulse (!) 129, temperature 97.6 °F (36.4 °C), temperature source Oral, resp. rate 14, height 5' 7\" (1.702 m), weight 186 lb (84.4 kg). Estimated body mass index is 29.13 kg/m² as calculated from the following:    Height as of this encounter: 5' 7\" (1.702 m). -   Weight as of this encounter: 186 lb (84.4 kg).   - General: pleasant, no distress, good eye contact  - HEENT: no pallor, no periorbital edema, EOMI  - Neck: supple,  - Cardiovascular: regular,  normal S1 and S2,  - Respiratory: clear to auscultation bilaterally  - Gastrointestinal: soft, nontender, nondistended,  BS +  - Musculoskeletal: no proximal muscle weakness in upper or lower extremities  -   - Neurological: alert and oriented  - Psychiatric: normal mood and affect  - Skin: color, texture, turgor normal.     Diabetic foot exam: March 2019    Left Foot:   Visual Exam: callous - Great toe   Pulse DP: 1+ (weak)   Filament test: reduced sensation    Vibratory sensation: diminished      Right Foot:   Visual Exam: normal    Pulse DP: 1+ (weak)   Filament test: reduced sensation    Vibratory sensation: diminished      Data Reviewed:       Lab Results   Component Value Date/Time    Hemoglobin A1c 6.7 (H) 03/16/2020 01:39 PM Hemoglobin A1c, External 6.9 10/29/2019    Glucose 143 (H) 03/16/2020 01:39 PM    Glucose  07/12/2019 01:06 PM    Microalb/Creat ratio (ug/mg creat.) 9.4 03/01/2019 02:13 PM    LDL,Direct 118 (H) 03/16/2020 01:39 PM    Creatinine 1.81 (H) 03/16/2020 01:39 PM      Lab Results   Component Value Date/Time    GFR est non-AA 36 (L) 03/16/2020 01:39 PM    GFR est AA 44 (L) 03/16/2020 01:39 PM    Creatinine 1.81 (H) 03/16/2020 01:39 PM    BUN 22 (H) 03/16/2020 01:39 PM    Sodium 139 03/16/2020 01:39 PM    Potassium 4.8 03/16/2020 01:39 PM    Chloride 105 03/16/2020 01:39 PM    CO2 28 03/16/2020 01:39 PM       Assessment/Plan:     1. Type 2 diabetes mellitus with hyperglycemia, without long-term current use of insulin ([address]    2. Essential hypertension    3. Mixed hyperlipidemia        1. Type 2 Diabetes Mellitus   Lab Results   Component Value Date/Time    Hemoglobin A1c 6.7 (H) 03/16/2020 01:39 PM    Hemoglobin A1c (POC) 6.6 10/09/2020 10:03 AM    Hemoglobin A1c, External 6.9 10/29/2019     Controlled diabetes, continue metformin. Lantus        2. HTN : Continue current therapy     3. Hyperlipidemia : Continue statin. 4.  CAD  No calf claudication    5. Peripheral neuropathy: Followed by neurology    There are no Patient Instructions on file for this visit. Thank you for allowing me to participate in the care of this patient. Gita Klinefelter, MD      Patient verbalized understanding     Voice-recognition software was used to generate this report, which may result in some phonetic-based errors in the grammar and contents. Even though attempts were made to correct all the mistakes, some may have been missed and remained in the body of the report.

## 2020-10-09 NOTE — PROGRESS NOTES
Wt Readings from Last 3 Encounters:   10/09/20 186 lb (84.4 kg)   11/18/19 190 lb (86.2 kg)   07/12/19 192 lb (87.1 kg)     Temp Readings from Last 3 Encounters:   10/09/20 97.6 °F (36.4 °C) (Oral)   11/18/19 95.9 °F (35.5 °C) (Oral)   07/12/19 95.6 °F (35.3 °C) (Oral)     BP Readings from Last 3 Encounters:   10/09/20 (!) 138/91   11/18/19 121/66   07/12/19 139/65     Pulse Readings from Last 3 Encounters:   10/09/20 (!) 129   11/18/19 61   07/12/19 62     Lab Results   Component Value Date/Time    Hemoglobin A1c 6.7 (H) 03/16/2020 01:39 PM    Hemoglobin A1c (POC) 6.4 07/12/2019 01:07 PM    Hemoglobin A1c, External 6.9 10/29/2019

## 2020-10-10 PROBLEM — G62.9 POLYNEUROPATHY, UNSPECIFIED: Status: ACTIVE | Noted: 2020-10-10

## 2020-12-22 DIAGNOSIS — E11.65 TYPE 2 DIABETES MELLITUS WITH HYPERGLYCEMIA, WITHOUT LONG-TERM CURRENT USE OF INSULIN (HCC): ICD-10-CM

## 2020-12-22 RX ORDER — INSULIN GLARGINE 100 [IU]/ML
INJECTION, SOLUTION SUBCUTANEOUS
Qty: 15 ML | Refills: 3 | Status: SHIPPED | OUTPATIENT
Start: 2020-12-22 | End: 2021-03-24 | Stop reason: SDUPTHER

## 2020-12-23 DIAGNOSIS — E11.65 TYPE 2 DIABETES MELLITUS WITH HYPERGLYCEMIA, WITHOUT LONG-TERM CURRENT USE OF INSULIN (HCC): Primary | ICD-10-CM

## 2020-12-24 RX ORDER — METFORMIN HYDROCHLORIDE 500 MG/1
TABLET, EXTENDED RELEASE ORAL
Qty: 360 TAB | Refills: 3 | Status: SHIPPED | OUTPATIENT
Start: 2020-12-24 | End: 2022-01-24

## 2020-12-26 RX ORDER — DULOXETIN HYDROCHLORIDE 20 MG/1
CAPSULE, DELAYED RELEASE ORAL
Qty: 90 CAP | Refills: 0 | Status: SHIPPED | OUTPATIENT
Start: 2020-12-26

## 2021-03-15 DIAGNOSIS — E11.65 TYPE 2 DIABETES MELLITUS WITH HYPERGLYCEMIA, WITHOUT LONG-TERM CURRENT USE OF INSULIN (HCC): ICD-10-CM

## 2021-03-15 RX ORDER — LISINOPRIL AND HYDROCHLOROTHIAZIDE 12.5; 2 MG/1; MG/1
TABLET ORAL
Qty: 90 TAB | Refills: 3 | Status: SHIPPED | OUTPATIENT
Start: 2021-03-15 | End: 2022-02-02 | Stop reason: SDUPTHER

## 2021-03-16 ENCOUNTER — OFFICE VISIT (OUTPATIENT)
Dept: FAMILY MEDICINE CLINIC | Age: 81
End: 2021-03-16
Payer: MEDICARE

## 2021-03-16 VITALS
DIASTOLIC BLOOD PRESSURE: 80 MMHG | SYSTOLIC BLOOD PRESSURE: 137 MMHG | TEMPERATURE: 97.8 F | HEART RATE: 65 BPM | HEIGHT: 65 IN | WEIGHT: 192 LBS | BODY MASS INDEX: 31.99 KG/M2

## 2021-03-16 VITALS
HEIGHT: 67 IN | BODY MASS INDEX: 28.56 KG/M2 | SYSTOLIC BLOOD PRESSURE: 112 MMHG | WEIGHT: 182 LBS | HEART RATE: 64 BPM | RESPIRATION RATE: 18 BRPM | DIASTOLIC BLOOD PRESSURE: 70 MMHG | TEMPERATURE: 97.1 F | OXYGEN SATURATION: 98 %

## 2021-03-16 DIAGNOSIS — N18.30 STAGE 3 CHRONIC KIDNEY DISEASE, UNSPECIFIED WHETHER STAGE 3A OR 3B CKD (HCC): ICD-10-CM

## 2021-03-16 DIAGNOSIS — E55.9 VITAMIN D DEFICIENCY: ICD-10-CM

## 2021-03-16 DIAGNOSIS — E78.2 MIXED HYPERLIPIDEMIA: ICD-10-CM

## 2021-03-16 DIAGNOSIS — E11.42 DIABETIC POLYNEUROPATHY ASSOCIATED WITH TYPE 2 DIABETES MELLITUS (HCC): ICD-10-CM

## 2021-03-16 DIAGNOSIS — I10 ESSENTIAL HYPERTENSION: Primary | ICD-10-CM

## 2021-03-16 DIAGNOSIS — I73.9 PAD (PERIPHERAL ARTERY DISEASE) (HCC): ICD-10-CM

## 2021-03-16 DIAGNOSIS — E11.65 TYPE 2 DIABETES MELLITUS WITH HYPERGLYCEMIA, WITHOUT LONG-TERM CURRENT USE OF INSULIN (HCC): ICD-10-CM

## 2021-03-16 PROBLEM — I25.10 CORONARY ARTERIOSCLEROSIS: Status: ACTIVE | Noted: 2017-06-29

## 2021-03-16 PROCEDURE — G8419 CALC BMI OUT NRM PARAM NOF/U: HCPCS | Performed by: STUDENT IN AN ORGANIZED HEALTH CARE EDUCATION/TRAINING PROGRAM

## 2021-03-16 PROCEDURE — G8754 DIAS BP LESS 90: HCPCS | Performed by: STUDENT IN AN ORGANIZED HEALTH CARE EDUCATION/TRAINING PROGRAM

## 2021-03-16 PROCEDURE — 99214 OFFICE O/P EST MOD 30 MIN: CPT | Performed by: STUDENT IN AN ORGANIZED HEALTH CARE EDUCATION/TRAINING PROGRAM

## 2021-03-16 PROCEDURE — G8752 SYS BP LESS 140: HCPCS | Performed by: STUDENT IN AN ORGANIZED HEALTH CARE EDUCATION/TRAINING PROGRAM

## 2021-03-16 PROCEDURE — G8510 SCR DEP NEG, NO PLAN REQD: HCPCS | Performed by: STUDENT IN AN ORGANIZED HEALTH CARE EDUCATION/TRAINING PROGRAM

## 2021-03-16 PROCEDURE — G8536 NO DOC ELDER MAL SCRN: HCPCS | Performed by: STUDENT IN AN ORGANIZED HEALTH CARE EDUCATION/TRAINING PROGRAM

## 2021-03-16 PROCEDURE — 1101F PT FALLS ASSESS-DOCD LE1/YR: CPT | Performed by: STUDENT IN AN ORGANIZED HEALTH CARE EDUCATION/TRAINING PROGRAM

## 2021-03-16 PROCEDURE — G8427 DOCREV CUR MEDS BY ELIG CLIN: HCPCS | Performed by: STUDENT IN AN ORGANIZED HEALTH CARE EDUCATION/TRAINING PROGRAM

## 2021-03-16 RX ORDER — GABAPENTIN 100 MG/1
100 CAPSULE ORAL 3 TIMES DAILY
Qty: 90 CAP | Refills: 0 | Status: SHIPPED | OUTPATIENT
Start: 2021-03-16 | End: 2021-07-28

## 2021-03-16 NOTE — PROGRESS NOTES
Chief Complaint   Patient presents with    Medication Refill     Visit Vitals  /70 (BP 1 Location: Left upper arm, BP Patient Position: Sitting)   Pulse 64   Temp 97.1 °F (36.2 °C) (Temporal)   Resp 18   Ht 5' 7\" (1.702 m)   Wt 182 lb (82.6 kg)   SpO2 98%   BMI 28.51 kg/m²     1. Have you been to the ER, urgent care clinic since your last visit? Hospitalized since your last visit? NO    2. Have you seen or consulted any other health care providers outside of the [address] [place] [address] since your last visit? Include any pap smears or colon screening.  NO

## 2021-03-16 NOTE — PROGRESS NOTES
Subjective:     Chief Complaint   Patient presents with    Medication Refill     HPI:  Milana Smith is a 78 y.o. male history of CAD. Previous labs in 7/2019 showed A1c of 6.9. Triglycerides elevated at 350, and HDL 30, VLDL 70 otherwise unremarkable. CMP did show creatinine of 1.37 and GFR of 48. CBC unremarkable. CADstress test in 219 was negative. Follows with cardiology. Saw 2 mos ago. HTNchecks at home and well controlled. Normal BP today. On metoprolol, and lisinoprilHCTZ. Tolerating medication well. DM 2-follows with endocrinology. On metformin and nightly Lantus. Scheduled to see endocrinology soon. Neuropathysecondary to diabetes. Has not tried gabapentin before. Describes neuropathy has pain and numbness in feet bilaterally. States the pain is constant. Willing to try gabapentin. MSK painon Cymbalta for this. HLD on omega-3 fish oil. Stopped taking Zetia.         Past Medical History:   Diagnosis Date    Cataract     Chronic kidney disease, stage 3 7/24/2019    stage 3a mild to moderate loss of kidney function    Coronary arteriosclerosis 6/29/2017    Coronary artery disease     Hyperlipidemia     Hypertension     Neuropathy     Type 2 diabetes mellitus ([facility] [address]     Vitamin D deficiency      Family History   Problem Relation Age of Onset    Cancer Mother         stomach    Heart Disease Father     Hypertension Father     Cancer Brother         brain tumor     Social History     Socioeconomic History    Marital status:      Spouse name: Not on file    Number of children: Not on file    Years of education: Not on file    Highest education level: Not on file   Occupational History    Not on file   Social Needs    Financial resource strain: Not on file    Food insecurity     Worry: Not on file     Inability: Not on file    Transportation needs     Medical: Not on file     Non-medical: Not on file   Tobacco Use    Smoking status: Former Smoker     Packs/day: 2.00     Years: 20.00     Pack years: 40.00     Quit date:      Years since quittin.2    Smokeless tobacco: Never Used   Substance and Sexual Activity    Alcohol use: No     Frequency: Never    Drug use: No    Sexual activity: Not on file   Lifestyle    Physical activity     Days per week: Not on file     Minutes per session: Not on file    Stress: Not on file   Relationships    Social connections     Talks on phone: Not on file     Gets together: Not on file     Attends Islam service: Not on file     Active member of club or organization: Not on file     Attends meetings of clubs or organizations: Not on file     Relationship status: Not on file    Intimate partner violence     Fear of current or ex partner: Not on file     Emotionally abused: Not on file     Physically abused: Not on file     Forced sexual activity: Not on file   Other Topics Concern    Not on file   Social History Narrative    Not on file     Current Outpatient Medications on File Prior to Visit   Medication Sig Dispense Refill    lisinopril-hydroCHLOROthiazide (PRINZIDE, ZESTORETIC) 20-12.5 mg per tablet TAKE 1 TABLET BY MOUTH DAILY 90 Tab 3    DULoxetine (CYMBALTA) 20 mg capsule TAKE 1 CAPSULE BY MOUTH EVERY DAY 90 Cap 0    metFORMIN ER (GLUCOPHAGE XR) 500 mg tablet TAKE 2 TABLETS BY MOUTH TWICE DAILY 360 Tab 3    Lantus Solostar U-100 Insulin 100 unit/mL (3 mL) inpn INJECT 10 UNITS SUBCUTANEOUSLY EVERY NIGHT AT BEDTIME 15 mL 3    metoprolol succinate (TOPROL-XL) 200 mg XL tablet Take 1 Tab by mouth daily. 90 Tab 3    ergocalciferol (VITAMIN D2) 50,000 unit capsule Take 5,000 Units by mouth daily.  alip acid/biotin/mins16/herb91 (BLOOD SUGAR BALANCE PO) Take 1 Tab by mouth daily.  fish oil-omega-3 fatty acids (FISH OIL) 300-1,000 mg cap Take 1 Cap by mouth three (3) times daily.       glucose blood VI test strips (Accu-Chek Patricia Plus test strp) strip USE TO TEST BLOOD SUGAR TWICE DAILY 100 Strip 5    Insulin Needles, Disposable, (ALICE PEN NEEDLE) 32 gauge x 5/32\" ndle Use to inject Lantus nightly. DX Code: E11.65 100 Pen Needle 3    Blood-Glucose Meter monitoring kit Test BID Dx Code: E11.65 1 Kit 0    ezetimibe (ZETIA) 10 mg tablet TK 1 T PO QD  1    [DISCONTINUED] diclofenac EC (VOLTAREN) 75 mg EC tablet TK 1 T PO BID  1    [DISCONTINUED] OTHER Take 1 Tab by mouth two (2) times a day. Proflexoral      [DISCONTINUED] OTHER Take 1 Tab by mouth daily. GS-85       No current facility-administered medications on file prior to visit. No Known Allergies  Review of Systems   All other systems reviewed and are negative. Objective:     Vitals:    03/16/21 1313   BP: 112/70   Pulse: 64   Resp: 18   Temp: 97.1 °F (36.2 °C)   TempSrc: Temporal   SpO2: 98%   Weight: 182 lb (82.6 kg)   Height: 5' 7\" (1.702 m)     Physical Exam  Vitals signs reviewed. HENT:      Head: Normocephalic and atraumatic. Cardiovascular:      Rate and Rhythm: Normal rate and regular rhythm. Pulses:           Popliteal pulses are 0 on the right side and 0 on the left side. Dorsalis pedis pulses are 0 on the right side and 0 on the left side. Pulmonary:      Effort: Pulmonary effort is normal.      Breath sounds: Normal breath sounds. Skin:     Comments: Small 1 cm wound noted near the medial side of the first metacarpal.   Neurological:      Mental Status: He is oriented to person, place, and time. Comments: Bilateral feet: Normal monofilament test   Psychiatric:         Behavior: Behavior normal.            Assessment/Plan:     1. Essential hypertension        -     Continue current management. Follow-up labs. -     CBC WITH AUTOMATED DIFF    2. Type 2 diabetes mellitus with hyperglycemia, without long-term current use of insulin (HCC)        -     Well-controlled. Follows with endocrinology. Follow-up labs.   -     METABOLIC PANEL, COMPREHENSIVE  -     gabapentin (NEURONTIN) 100 mg capsule; Take 1 Cap by mouth three (3) times daily. Max Daily Amount: 300 mg.    3. Vitamin D deficiency        -     Continue current management. Follow-up labs. -     VITAMIN D, 25 HYDROXY    4. Mixed hyperlipidemia        -     Follow-up results. restart Zetia. Continue omega-3.  -     CBC WITH AUTOMATED DIFF  -     LIPID PANEL    5. Stage 3 chronic kidney disease, unspecified whether stage 3a or 3b CKD     -     METABOLIC PANEL, COMPREHENSIVE    6. Possible PAD (peripheral artery disease) ([facility])        -     No DP pulses felt today. SAY ordered. -     ANKLE BRACHIAL INDEX; Future    7. Diabetic polyneuropathy associated with type 2 diabetes mellitus (HCC)        -     Gabapentin ordered for LE neuropathic pain. Please begin taking 1 tablet at night for about 2 to 3 days, and if tolerating well, then  you can go ahead and take 1 tablet at lunchtime, and 1 tablet at night, and if tolerating well you can can go ahead and take 3 times a day. -     gabapentin (NEURONTIN) 100 mg capsule; Take 1 Cap by mouth three (3) times daily. Max Daily Amount: 300 mg.    8. Small foot wound  -Advised to keep covered with bandage or Band-Aid, and put Neosporin on the area. Follow-up and Dispositions    · Return in about 3 weeks (around 4/6/2021) for Decreased pulses, neuropathy.          Mandy Sellers MD

## 2021-03-16 NOTE — PATIENT INSTRUCTIONS
Gabapentin ordered for her neuropathic pain. Please begin taking 1 tablet at night for about 2 to 3 days, and if tolerating well  you can go ahead and take 1 tablet at lunchtime, and 1 tablet at night, and if tolerating well you can can go ahead and take 3 times a day. Please get study ordered of legs done.

## 2021-03-17 LAB
25(OH)D3+25(OH)D2 SERPL-MCNC: 97.8 NG/ML (ref 30–100)
ALBUMIN SERPL-MCNC: 4.3 G/DL (ref 3.6–4.6)
ALBUMIN/GLOB SERPL: 1.6 {RATIO} (ref 1.2–2.2)
ALP SERPL-CCNC: 69 IU/L (ref 39–117)
ALT SERPL-CCNC: 7 IU/L (ref 0–44)
AST SERPL-CCNC: 12 IU/L (ref 0–40)
BASOPHILS # BLD AUTO: 0.1 X10E3/UL (ref 0–0.2)
BASOPHILS NFR BLD AUTO: 1 %
BILIRUB SERPL-MCNC: 0.4 MG/DL (ref 0–1.2)
BUN SERPL-MCNC: 18 MG/DL (ref 8–27)
BUN/CREAT SERPL: 15 (ref 10–24)
CALCIUM SERPL-MCNC: 9.5 MG/DL (ref 8.6–10.2)
CHLORIDE SERPL-SCNC: 105 MMOL/L (ref 96–106)
CHOLEST SERPL-MCNC: 190 MG/DL (ref 100–199)
CO2 SERPL-SCNC: 22 MMOL/L (ref 20–29)
CREAT SERPL-MCNC: 1.23 MG/DL (ref 0.76–1.27)
EOSINOPHIL # BLD AUTO: 0.2 X10E3/UL (ref 0–0.4)
EOSINOPHIL NFR BLD AUTO: 2 %
ERYTHROCYTE [DISTWIDTH] IN BLOOD BY AUTOMATED COUNT: 13.1 % (ref 11.6–15.4)
GLOBULIN SER CALC-MCNC: 2.7 G/DL (ref 1.5–4.5)
GLUCOSE SERPL-MCNC: 210 MG/DL (ref 65–99)
HCT VFR BLD AUTO: 46.2 % (ref 37.5–51)
HDLC SERPL-MCNC: 31 MG/DL
HGB BLD-MCNC: 15.5 G/DL (ref 13–17.7)
IMM GRANULOCYTES # BLD AUTO: 0 X10E3/UL (ref 0–0.1)
IMM GRANULOCYTES NFR BLD AUTO: 0 %
LDLC SERPL CALC-MCNC: 107 MG/DL (ref 0–99)
LYMPHOCYTES # BLD AUTO: 1.7 X10E3/UL (ref 0.7–3.1)
LYMPHOCYTES NFR BLD AUTO: 17 %
MCH RBC QN AUTO: 30.2 PG (ref 26.6–33)
MCHC RBC AUTO-ENTMCNC: 33.5 G/DL (ref 31.5–35.7)
MCV RBC AUTO: 90 FL (ref 79–97)
MONOCYTES # BLD AUTO: 0.7 X10E3/UL (ref 0.1–0.9)
MONOCYTES NFR BLD AUTO: 7 %
NEUTROPHILS # BLD AUTO: 7.6 X10E3/UL (ref 1.4–7)
NEUTROPHILS NFR BLD AUTO: 73 %
PLATELET # BLD AUTO: 173 X10E3/UL (ref 150–450)
POTASSIUM SERPL-SCNC: 4.7 MMOL/L (ref 3.5–5.2)
PROT SERPL-MCNC: 7 G/DL (ref 6–8.5)
RBC # BLD AUTO: 5.13 X10E6/UL (ref 4.14–5.8)
SODIUM SERPL-SCNC: 143 MMOL/L (ref 134–144)
TRIGL SERPL-MCNC: 304 MG/DL (ref 0–149)
VLDLC SERPL CALC-MCNC: 52 MG/DL (ref 5–40)
WBC # BLD AUTO: 10.4 X10E3/UL (ref 3.4–10.8)

## 2021-03-20 DIAGNOSIS — I25.10 CORONARY ARTERIOSCLEROSIS: ICD-10-CM

## 2021-03-20 DIAGNOSIS — E78.2 MIXED HYPERLIPIDEMIA: Primary | ICD-10-CM

## 2021-03-20 RX ORDER — EZETIMIBE 10 MG/1
10 TABLET ORAL DAILY
Qty: 90 TAB | Refills: 1 | Status: SHIPPED | OUTPATIENT
Start: 2021-03-20 | End: 2021-09-04

## 2021-03-20 NOTE — PROGRESS NOTES
Result note sent via Fixet[employer]:    Glucose is elevated at 210. We will see what A1c shows when you see the endocrinologist.  One of your kidney labs GFR is mildly abnormal, but is actually improved from previous times it has been checked. Overall your kidney function has improved, has not yet have a normal creatinine  Also. Your cholesterol is elevated I know you have tried Zetia and statins in the past, but your bad cholesterol triglycerides are elevated. Especially with your history of coronary artery disease he needs to be on a medication. I saw that he stopped taking Zetia. Have you been on a statin before? Zetia was reordered.

## 2021-03-24 ENCOUNTER — OFFICE VISIT (OUTPATIENT)
Dept: ENDOCRINOLOGY | Age: 81
End: 2021-03-24
Payer: MEDICARE

## 2021-03-24 VITALS
WEIGHT: 181 LBS | OXYGEN SATURATION: 96 % | DIASTOLIC BLOOD PRESSURE: 57 MMHG | BODY MASS INDEX: 28.41 KG/M2 | RESPIRATION RATE: 16 BRPM | HEIGHT: 67 IN | HEART RATE: 69 BPM | SYSTOLIC BLOOD PRESSURE: 113 MMHG | TEMPERATURE: 97.2 F

## 2021-03-24 DIAGNOSIS — E11.65 TYPE 2 DIABETES MELLITUS WITH HYPERGLYCEMIA, WITHOUT LONG-TERM CURRENT USE OF INSULIN (HCC): Primary | ICD-10-CM

## 2021-03-24 DIAGNOSIS — E78.2 MIXED HYPERLIPIDEMIA: ICD-10-CM

## 2021-03-24 DIAGNOSIS — I10 ESSENTIAL HYPERTENSION: ICD-10-CM

## 2021-03-24 PROCEDURE — G8754 DIAS BP LESS 90: HCPCS | Performed by: INTERNAL MEDICINE

## 2021-03-24 PROCEDURE — G8752 SYS BP LESS 140: HCPCS | Performed by: INTERNAL MEDICINE

## 2021-03-24 PROCEDURE — G8419 CALC BMI OUT NRM PARAM NOF/U: HCPCS | Performed by: INTERNAL MEDICINE

## 2021-03-24 PROCEDURE — 99214 OFFICE O/P EST MOD 30 MIN: CPT | Performed by: INTERNAL MEDICINE

## 2021-03-24 PROCEDURE — G8536 NO DOC ELDER MAL SCRN: HCPCS | Performed by: INTERNAL MEDICINE

## 2021-03-24 PROCEDURE — G8510 SCR DEP NEG, NO PLAN REQD: HCPCS | Performed by: INTERNAL MEDICINE

## 2021-03-24 PROCEDURE — 1101F PT FALLS ASSESS-DOCD LE1/YR: CPT | Performed by: INTERNAL MEDICINE

## 2021-03-24 PROCEDURE — G8427 DOCREV CUR MEDS BY ELIG CLIN: HCPCS | Performed by: INTERNAL MEDICINE

## 2021-03-24 RX ORDER — LANOLIN ALCOHOL/MO/W.PET/CERES
500 CREAM (GRAM) TOPICAL DAILY
COMMUNITY

## 2021-03-24 RX ORDER — INSULIN GLARGINE 100 [IU]/ML
20 INJECTION, SOLUTION SUBCUTANEOUS
Qty: 15 ML | Refills: 5 | Status: SHIPPED | OUTPATIENT
Start: 2021-03-24 | End: 2021-07-28 | Stop reason: SDUPTHER

## 2021-03-24 NOTE — PROGRESS NOTES
Alyssa Guan MD                  Patient Information  Date:3/24/2021  Name : Marcello Watters 78 y.o.     YOB: 1940               Chief Complaint   Patient presents with    Diabetes       History of Present Illness: Marcello Watters is a 78 y.o. male here for follow-up of  Type 2 Diabetes Mellitus. Self-referred for evaluation management of type 2 diabetes mellitus. He was managed by Dr. So Shoemaker in the past  He is on metformin, lantus 20 units , self increased   BG was high, reports that nothing has changed, no changes in the diet, activity he is sleeping okay    Diabetes was diagnosed in 2015, complains of tingling, pain in the feet. Evaluated by Dr. Hernandez Res: Has peripheral neuropathy, sciatic radiculopathy, left leg weakness  Qualified for extra benefits based on  disability benefits          Eats 3 meals a day,    Wt Readings from Last 3 Encounters:   03/24/21 181 lb (82.1 kg)   03/16/21 182 lb (82.6 kg)   10/28/19 192 lb (87.1 kg)       BP Readings from Last 3 Encounters:   03/24/21 (!) 113/57   03/16/21 112/70   10/28/19 137/80           Past Medical History:   Diagnosis Date    Cataract     Chronic kidney disease, stage 3 7/24/2019    stage 3a mild to moderate loss of kidney function    Coronary arteriosclerosis 6/29/2017    Coronary artery disease     Hyperlipidemia     Hypertension     Neuropathy     Type 2 diabetes mellitus (HCC)     Vitamin D deficiency      Current Outpatient Medications   Medication Sig    cyanocobalamin (VITAMIN B12) 500 mcg tablet Take 500 mcg by mouth daily.  insulin glargine (Lantus Solostar U-100 Insulin) 100 unit/mL (3 mL) inpn 20 Units by SubCUTAneous route nightly.  ezetimibe (ZETIA) 10 mg tablet Take 1 Tab by mouth daily.  gabapentin (NEURONTIN) 100 mg capsule Take 1 Cap by mouth three (3) times daily.  Max Daily Amount: 300 mg.    lisinopril-hydroCHLOROthiazide (PRINZIDE, ZESTORETIC) 20-12.5 mg per tablet TAKE 1 TABLET BY MOUTH DAILY    DULoxetine (CYMBALTA) 20 mg capsule TAKE 1 CAPSULE BY MOUTH EVERY DAY    metFORMIN ER (GLUCOPHAGE XR) 500 mg tablet TAKE 2 TABLETS BY MOUTH TWICE DAILY    glucose blood VI test strips (Accu-Chek Patricia Plus test strp) strip USE TO TEST BLOOD SUGAR TWICE DAILY    Insulin Needles, Disposable, (ALICE PEN NEEDLE) 32 gauge x 5/32\" ndle Use to inject Lantus nightly. DX Code: E11.65    metoprolol succinate (TOPROL-XL) 200 mg XL tablet Take 1 Tab by mouth daily.  Blood-Glucose Meter monitoring kit Test BID Dx Code: E11.65    ergocalciferol (VITAMIN D2) 50,000 unit capsule Take 5,000 Units by mouth daily.  alip acid/biotin/mins16/herb91 (BLOOD SUGAR BALANCE PO) Take 1 Tab by mouth daily.  fish oil-omega-3 fatty acids (FISH OIL) 300-1,000 mg cap Take 1 Cap by mouth three (3) times daily. No current facility-administered medications for this visit. No Known Allergies    Review of Systems: Per HPI    Physical Examination:   Blood pressure (!) 113/57, pulse 69, temperature 97.2 °F (36.2 °C), temperature source Oral, resp. rate 16, height 5' 7\" (1.702 m), weight 181 lb (82.1 kg), SpO2 96 %. Estimated body mass index is 28.35 kg/m² as calculated from the following:    Height as of this encounter: 5' 7\" (1.702 m). -   Weight as of this encounter: 181 lb (82.1 kg).   - General: pleasant, no distress, good eye contact  - HEENT: no pallor, no periorbital edema, EOMI  - Neck: supple,  - Cardiovascular: regular,  normal S1 and S2,  - Respiratory: clear to auscultation bilaterally  -   - Musculoskeletal: no proximal muscle weakness in upper or lower extremities  -   - Neurological: alert and oriented  - Psychiatric: normal mood and affect  - Skin: color, texture, turgor normal.     Diabetic foot exam: March 2019    Left Foot:   Visual Exam: callous - Great toe   Pulse DP: 1+ (weak)   Filament test: reduced sensation    Vibratory sensation: diminished      Right Foot:   Visual Exam: normal    Pulse DP: 1+ (weak)   Filament test: reduced sensation    Vibratory sensation: diminished      Data Reviewed:       Lab Results   Component Value Date/Time    Hemoglobin A1c 6.7 (H) 03/16/2020 01:39 PM    Hemoglobin A1c, External 6.9 10/29/2019    Hemoglobin A1c, External 6.9 10/28/2019    Glucose 210 (H) 03/16/2021 01:54 PM    Glucose  07/12/2019 01:06 PM    Microalb/Creat ratio (ug/mg creat.) 9.4 03/01/2019 02:13 PM    LDL,Direct 118 (H) 03/16/2020 01:39 PM    LDL, calculated 107 (H) 03/16/2021 01:54 PM    Creatinine 1.23 03/16/2021 01:54 PM      Lab Results   Component Value Date/Time    GFR est non-AA 54 (L) 03/16/2021 01:54 PM    GFR est AA 63 03/16/2021 01:54 PM    Creatinine 1.23 03/16/2021 01:54 PM    BUN 18 03/16/2021 01:54 PM    Sodium 143 03/16/2021 01:54 PM    Potassium 4.7 03/16/2021 01:54 PM    Chloride 105 03/16/2021 01:54 PM    CO2 22 03/16/2021 01:54 PM       Assessment/Plan:     1. Type 2 diabetes mellitus with hyperglycemia, without long-term current use of insulin ([facility] [address]    2. Essential hypertension    3. Mixed hyperlipidemia        1. Type 2 Diabetes Mellitus   Lab Results   Component Value Date/Time    Hemoglobin A1c 6.7 (H) 03/16/2020 01:39 PM    Hemoglobin A1c (POC) 6.6 10/09/2020 10:03 AM    Hemoglobin A1c, External 6.9 10/29/2019   A1c March 2021: 7.7  A1c increased, at the end of the visit states that he is eating honey bun and snacks before going to bed which is causing fasting hyperglycemia  Discussed about protein snacks  Lantus 20 units          2. HTN : Continue current therapy     3. Hyperlipidemia : Continue statin. 4.  CAD  No calf claudication    5. Peripheral neuropathy: Followed by neurology    There are no Patient Instructions on file for this visit. Follow-up and Dispositions    · Return in about 4 months (around 7/24/2021). Thank you for allowing me to participate in the care of this patient.     Juana Slaughter Micheline Kent MD      Patient verbalized understanding     Voice-recognition software was used to generate this report, which may result in some phonetic-based errors in the grammar and contents. Even though attempts were made to correct all the mistakes, some may have been missed and remained in the body of the report.

## 2021-03-24 NOTE — PROGRESS NOTES
Shelbi Jacobo is a 78 y.o. male here for   Chief Complaint   Patient presents with    Diabetes       1. Have you been to the ER, urgent care clinic since your last visit? Hospitalized since your last visit? -no    2. Have you seen or consulted any other health care providers outside of the [address] [place] [address] since your last visit? Include any pap smears or colon screening. cardiology Dr. Montero Saad

## 2021-04-06 ENCOUNTER — OFFICE VISIT (OUTPATIENT)
Dept: FAMILY MEDICINE CLINIC | Age: 81
End: 2021-04-06
Payer: MEDICARE

## 2021-04-06 VITALS
WEIGHT: 185 LBS | BODY MASS INDEX: 28.98 KG/M2 | OXYGEN SATURATION: 96 % | DIASTOLIC BLOOD PRESSURE: 60 MMHG | TEMPERATURE: 98.7 F | SYSTOLIC BLOOD PRESSURE: 130 MMHG | HEART RATE: 68 BPM

## 2021-04-06 DIAGNOSIS — E11.65 TYPE 2 DIABETES MELLITUS WITH HYPERGLYCEMIA, WITHOUT LONG-TERM CURRENT USE OF INSULIN (HCC): ICD-10-CM

## 2021-04-06 DIAGNOSIS — I73.9 PAD (PERIPHERAL ARTERY DISEASE) (HCC): ICD-10-CM

## 2021-04-06 DIAGNOSIS — E11.42 DIABETIC POLYNEUROPATHY ASSOCIATED WITH TYPE 2 DIABETES MELLITUS (HCC): Primary | ICD-10-CM

## 2021-04-06 PROCEDURE — 1101F PT FALLS ASSESS-DOCD LE1/YR: CPT | Performed by: STUDENT IN AN ORGANIZED HEALTH CARE EDUCATION/TRAINING PROGRAM

## 2021-04-06 PROCEDURE — G8419 CALC BMI OUT NRM PARAM NOF/U: HCPCS | Performed by: STUDENT IN AN ORGANIZED HEALTH CARE EDUCATION/TRAINING PROGRAM

## 2021-04-06 PROCEDURE — 99213 OFFICE O/P EST LOW 20 MIN: CPT | Performed by: STUDENT IN AN ORGANIZED HEALTH CARE EDUCATION/TRAINING PROGRAM

## 2021-04-06 PROCEDURE — G8752 SYS BP LESS 140: HCPCS | Performed by: STUDENT IN AN ORGANIZED HEALTH CARE EDUCATION/TRAINING PROGRAM

## 2021-04-06 PROCEDURE — G8510 SCR DEP NEG, NO PLAN REQD: HCPCS | Performed by: STUDENT IN AN ORGANIZED HEALTH CARE EDUCATION/TRAINING PROGRAM

## 2021-04-06 PROCEDURE — G8427 DOCREV CUR MEDS BY ELIG CLIN: HCPCS | Performed by: STUDENT IN AN ORGANIZED HEALTH CARE EDUCATION/TRAINING PROGRAM

## 2021-04-06 PROCEDURE — G8754 DIAS BP LESS 90: HCPCS | Performed by: STUDENT IN AN ORGANIZED HEALTH CARE EDUCATION/TRAINING PROGRAM

## 2021-04-06 PROCEDURE — G8536 NO DOC ELDER MAL SCRN: HCPCS | Performed by: STUDENT IN AN ORGANIZED HEALTH CARE EDUCATION/TRAINING PROGRAM

## 2021-04-06 NOTE — PROGRESS NOTES
Chief Complaint   Patient presents with    Follow Up Chronic Condition     f/u from appt 3 weeks ago     1. Have you been to the ER, urgent care clinic since your last visit? Hospitalized since your last visit? No    2. Have you seen or consulted any other health care providers outside of the [address] [place] [address] since your last visit? Include any pap smears or colon screening.  No     3 most recent PHQ Screens 4/6/2021   Little interest or pleasure in doing things Not at all   Feeling down, depressed, irritable, or hopeless Not at all   Total Score PHQ 2 0

## 2021-07-19 LAB
ALBUMIN SERPL-MCNC: 4.4 G/DL (ref 3.6–4.6)
ALBUMIN/CREAT UR: 6 MG/G CREAT (ref 0–29)
ALBUMIN/GLOB SERPL: 1.8 {RATIO} (ref 1.2–2.2)
ALP SERPL-CCNC: 72 IU/L (ref 48–121)
ALT SERPL-CCNC: 9 IU/L (ref 0–44)
AST SERPL-CCNC: 14 IU/L (ref 0–40)
BILIRUB SERPL-MCNC: 0.4 MG/DL (ref 0–1.2)
BUN SERPL-MCNC: 19 MG/DL (ref 8–27)
BUN/CREAT SERPL: 14 (ref 10–24)
CALCIUM SERPL-MCNC: 9.3 MG/DL (ref 8.6–10.2)
CHLORIDE SERPL-SCNC: 102 MMOL/L (ref 96–106)
CO2 SERPL-SCNC: 26 MMOL/L (ref 20–29)
CREAT SERPL-MCNC: 1.39 MG/DL (ref 0.76–1.27)
CREAT UR-MCNC: 159.9 MG/DL
EST. AVERAGE GLUCOSE BLD GHB EST-MCNC: 171 MG/DL
GLOBULIN SER CALC-MCNC: 2.4 G/DL (ref 1.5–4.5)
GLUCOSE SERPL-MCNC: 150 MG/DL (ref 65–99)
HBA1C MFR BLD: 7.6 % (ref 4.8–5.6)
INTERPRETATION: NORMAL
MICROALBUMIN UR-MCNC: 10.1 UG/ML
POTASSIUM SERPL-SCNC: 4.4 MMOL/L (ref 3.5–5.2)
PROT SERPL-MCNC: 6.8 G/DL (ref 6–8.5)
SODIUM SERPL-SCNC: 141 MMOL/L (ref 134–144)

## 2021-07-21 ENCOUNTER — OFFICE VISIT (OUTPATIENT)
Dept: ENDOCRINOLOGY | Age: 81
End: 2021-07-21
Payer: MEDICARE

## 2021-07-21 VITALS
HEIGHT: 67 IN | RESPIRATION RATE: 18 BRPM | SYSTOLIC BLOOD PRESSURE: 128 MMHG | TEMPERATURE: 98.1 F | BODY MASS INDEX: 28.88 KG/M2 | DIASTOLIC BLOOD PRESSURE: 64 MMHG | OXYGEN SATURATION: 96 % | WEIGHT: 184 LBS | HEART RATE: 70 BPM

## 2021-07-21 DIAGNOSIS — E11.65 TYPE 2 DIABETES MELLITUS WITH HYPERGLYCEMIA, WITHOUT LONG-TERM CURRENT USE OF INSULIN (HCC): Primary | ICD-10-CM

## 2021-07-21 DIAGNOSIS — E78.2 MIXED HYPERLIPIDEMIA: ICD-10-CM

## 2021-07-21 DIAGNOSIS — I10 ESSENTIAL HYPERTENSION: ICD-10-CM

## 2021-07-21 PROCEDURE — G8419 CALC BMI OUT NRM PARAM NOF/U: HCPCS | Performed by: INTERNAL MEDICINE

## 2021-07-21 PROCEDURE — G8432 DEP SCR NOT DOC, RNG: HCPCS | Performed by: INTERNAL MEDICINE

## 2021-07-21 PROCEDURE — G8536 NO DOC ELDER MAL SCRN: HCPCS | Performed by: INTERNAL MEDICINE

## 2021-07-21 PROCEDURE — 99214 OFFICE O/P EST MOD 30 MIN: CPT | Performed by: INTERNAL MEDICINE

## 2021-07-21 PROCEDURE — G8427 DOCREV CUR MEDS BY ELIG CLIN: HCPCS | Performed by: INTERNAL MEDICINE

## 2021-07-21 PROCEDURE — G8752 SYS BP LESS 140: HCPCS | Performed by: INTERNAL MEDICINE

## 2021-07-21 PROCEDURE — 1101F PT FALLS ASSESS-DOCD LE1/YR: CPT | Performed by: INTERNAL MEDICINE

## 2021-07-21 PROCEDURE — G8754 DIAS BP LESS 90: HCPCS | Performed by: INTERNAL MEDICINE

## 2021-07-21 PROCEDURE — 3051F HG A1C>EQUAL 7.0%<8.0%: CPT | Performed by: INTERNAL MEDICINE

## 2021-07-21 NOTE — PROGRESS NOTES
Ahmet Aleman MD            Patient Information  Date:7/21/2021  Name : Enzo Keane 78 y.o.     YOB: 1940               Chief Complaint   Patient presents with    Diabetes       History of Present Illness: Enzo Keane is a 78 y.o. male here for follow-up of  Type 2 Diabetes Mellitus. Self-referred for evaluation management of type 2 diabetes mellitus. He was managed by Dr. Jackson Taveras in the past  He is on metformin, lantus 20 units  Weight has been stable  Eating chocolate, ice cream at bedtime,  Diabetes was diagnosed in 2015, complains of tingling, pain in the feet. Evaluated by Dr. Jimbo Flores: Has peripheral neuropathy, sciatic radiculopathy, left leg weakness  Qualified for extra benefits based on  disability benefits          Eats 3 meals a day,    Wt Readings from Last 3 Encounters:   07/21/21 184 lb (83.5 kg)   04/06/21 185 lb (83.9 kg)   03/24/21 181 lb (82.1 kg)       BP Readings from Last 3 Encounters:   07/21/21 128/64   04/06/21 130/60   03/24/21 (!) 113/57           Past Medical History:   Diagnosis Date    Cataract     Chronic kidney disease, stage 3 ([address] 7/24/2019    stage 3a mild to moderate loss of kidney function    Coronary arteriosclerosis 6/29/2017    Coronary artery disease     Hyperlipidemia     Hypertension     Neuropathy     Type 2 diabetes mellitus (HCC)     Vitamin D deficiency      Current Outpatient Medications   Medication Sig    cyanocobalamin (VITAMIN B12) 500 mcg tablet Take 500 mcg by mouth daily.  insulin glargine (Lantus Solostar U-100 Insulin) 100 unit/mL (3 mL) inpn 20 Units by SubCUTAneous route nightly.  ezetimibe (ZETIA) 10 mg tablet Take 1 Tab by mouth daily.     lisinopril-hydroCHLOROthiazide (PRINZIDE, ZESTORETIC) 20-12.5 mg per tablet TAKE 1 TABLET BY MOUTH DAILY    DULoxetine (CYMBALTA) 20 mg capsule TAKE 1 CAPSULE BY MOUTH EVERY DAY    metFORMIN ER (GLUCOPHAGE XR) 500 mg tablet TAKE 2 TABLETS BY MOUTH TWICE DAILY    glucose blood VI test strips (Accu-Chek Patricia Plus test strp) strip USE TO TEST BLOOD SUGAR TWICE DAILY    Insulin Needles, Disposable, (ALICE PEN NEEDLE) 32 gauge x 5/32\" ndle Use to inject Lantus nightly. DX Code: E11.65    metoprolol succinate (TOPROL-XL) 200 mg XL tablet Take 1 Tab by mouth daily.  Blood-Glucose Meter monitoring kit Test BID Dx Code: E11.65    ergocalciferol (VITAMIN D2) 50,000 unit capsule Take 5,000 Units by mouth daily.  alip acid/biotin/mins16/herb91 (BLOOD SUGAR BALANCE PO) Take 1 Tab by mouth daily.  gabapentin (NEURONTIN) 100 mg capsule Take 1 Cap by mouth three (3) times daily. Max Daily Amount: 300 mg. (Patient not taking: Reported on 7/21/2021)     No current facility-administered medications for this visit. No Known Allergies    Review of Systems: Per HPI    Physical Examination:   Blood pressure 128/64, pulse 70, temperature 98.1 °F (36.7 °C), temperature source Temporal, resp. rate 18, height 5' 7\" (1.702 m), weight 184 lb (83.5 kg), SpO2 96 %. Estimated body mass index is 28.82 kg/m² as calculated from the following:    Height as of this encounter: 5' 7\" (1.702 m). -   Weight as of this encounter: 184 lb (83.5 kg).   - General: pleasant, no distress, good eye contact  - HEENT: no pallor, no periorbital edema, EOMI  - Neck: supple,  - Cardiovascular: regular,  normal S1 and S2,  - Respiratory: clear to auscultation bilaterally  -   - Musculoskeletal: no proximal muscle weakness in upper or lower extremities  -   - Neurological: alert and oriented  - Psychiatric: normal mood and affect  - Skin: color, texture, turgor normal.     Diabetic foot exam: March 2019    Left Foot:   Visual Exam: callous - Great toe   Pulse DP: 1+ (weak)   Filament test: reduced sensation    Vibratory sensation: diminished      Right Foot:   Visual Exam: normal    Pulse DP: 1+ (weak)   Filament test: reduced sensation    Vibratory sensation: diminished  Diabetic Foot and Eye Exam HM Status   Topic Date Due    Eye Exam  Never done    Diabetic Foot Care  07/21/2022         Data Reviewed:       Lab Results   Component Value Date/Time    Hemoglobin A1c 7.6 (H) 07/16/2021 12:00 AM    Hemoglobin A1c 6.7 (H) 03/16/2020 01:39 PM    Hemoglobin A1c, External 6.9 10/29/2019 12:00 AM    Hemoglobin A1c, External 6.9 10/28/2019 12:00 AM    Glucose 150 (H) 07/16/2021 12:00 AM    Glucose  07/12/2019 01:06 PM    Microalb/Creat ratio (ug/mg creat.) 6 07/16/2021 12:00 AM    LDL,Direct 118 (H) 03/16/2020 01:39 PM    LDL, calculated 107 (H) 03/16/2021 01:54 PM    Creatinine 1.39 (H) 07/16/2021 12:00 AM      Lab Results   Component Value Date/Time    GFR est non-AA 47 (L) 07/16/2021 12:00 AM    GFR est AA 54 (L) 07/16/2021 12:00 AM    Creatinine 1.39 (H) 07/16/2021 12:00 AM    BUN 19 07/16/2021 12:00 AM    Sodium 141 07/16/2021 12:00 AM    Potassium 4.4 07/16/2021 12:00 AM    Chloride 102 07/16/2021 12:00 AM    CO2 26 07/16/2021 12:00 AM       Assessment/Plan:     1. Type 2 diabetes mellitus with hyperglycemia, without long-term current use of insulin ([address]    2. Essential hypertension    3. Mixed hyperlipidemia        1. Type 2 Diabetes Mellitus   Lab Results   Component Value Date/Time    Hemoglobin A1c 7.6 (H) 07/16/2021 12:00 AM    Hemoglobin A1c (POC) 6.6 10/09/2020 10:03 AM    Hemoglobin A1c, External 6.9 10/29/2019 12:00 AM   A1c March 2021: 7.7  Dietary indiscretion  Discussed about protein snacks  Lantus 20 units  Annual eye exam        2. HTN : Continue current therapy     3. Hyperlipidemia : zetia     4. CAD  No calf claudication    5. Peripheral neuropathy: Followed by neurology    There are no Patient Instructions on file for this visit. Thank you for allowing me to participate in the care of this patient.     Artie Herron MD      Patient verbalized understanding     Voice-recognition software was used to generate this report, which may result in some phonetic-based errors in the grammar and contents. Even though attempts were made to correct all the mistakes, some may have been missed and remained in the body of the report.

## 2021-07-21 NOTE — PROGRESS NOTES
Sarthak Moore is a 78 y.o. male here for   Chief Complaint   Patient presents with    Diabetes       1. Have you been to the ER, urgent care clinic since your last visit? Hospitalized since your last visit? -no    2. Have you seen or consulted any other health care providers outside of the [address] [place] [address] since your last visit? Include any pap smears or colon screening. -PCP

## 2021-07-21 NOTE — LETTER
7/24/2021    Patient: Emir Beasley   YOB: 1940   Date of Visit: 7/21/2021     Jonathon Soriano MD  [address] [place] [address] [address]. [address] [place] [address]    Dear Jonathon Soriano MD,      Thank you for referring Mr. Gordy Glasgow to [address] [place] [address] for evaluation. My notes for this consultation are attached. If you have questions, please do not hesitate to call me. I look forward to following your patient along with you.       Sincerely,    Chrsitina Durbin MD

## 2021-07-28 ENCOUNTER — OFFICE VISIT (OUTPATIENT)
Dept: ENDOCRINOLOGY | Age: 81
End: 2021-07-28
Payer: MEDICARE

## 2021-07-28 VITALS
BODY MASS INDEX: 28.88 KG/M2 | TEMPERATURE: 98.7 F | OXYGEN SATURATION: 94 % | RESPIRATION RATE: 20 BRPM | DIASTOLIC BLOOD PRESSURE: 69 MMHG | HEIGHT: 67 IN | HEART RATE: 91 BPM | WEIGHT: 184 LBS | SYSTOLIC BLOOD PRESSURE: 130 MMHG

## 2021-07-28 DIAGNOSIS — E78.2 MIXED HYPERLIPIDEMIA: ICD-10-CM

## 2021-07-28 DIAGNOSIS — E11.65 TYPE 2 DIABETES MELLITUS WITH HYPERGLYCEMIA, WITHOUT LONG-TERM CURRENT USE OF INSULIN (HCC): Primary | ICD-10-CM

## 2021-07-28 DIAGNOSIS — I10 ESSENTIAL HYPERTENSION: ICD-10-CM

## 2021-07-28 PROCEDURE — 99214 OFFICE O/P EST MOD 30 MIN: CPT | Performed by: INTERNAL MEDICINE

## 2021-07-28 PROCEDURE — 3051F HG A1C>EQUAL 7.0%<8.0%: CPT | Performed by: INTERNAL MEDICINE

## 2021-07-28 PROCEDURE — G8510 SCR DEP NEG, NO PLAN REQD: HCPCS | Performed by: INTERNAL MEDICINE

## 2021-07-28 PROCEDURE — G8754 DIAS BP LESS 90: HCPCS | Performed by: INTERNAL MEDICINE

## 2021-07-28 PROCEDURE — G8536 NO DOC ELDER MAL SCRN: HCPCS | Performed by: INTERNAL MEDICINE

## 2021-07-28 PROCEDURE — G8419 CALC BMI OUT NRM PARAM NOF/U: HCPCS | Performed by: INTERNAL MEDICINE

## 2021-07-28 PROCEDURE — G8427 DOCREV CUR MEDS BY ELIG CLIN: HCPCS | Performed by: INTERNAL MEDICINE

## 2021-07-28 PROCEDURE — 1101F PT FALLS ASSESS-DOCD LE1/YR: CPT | Performed by: INTERNAL MEDICINE

## 2021-07-28 PROCEDURE — G8752 SYS BP LESS 140: HCPCS | Performed by: INTERNAL MEDICINE

## 2021-07-28 RX ORDER — INSULIN GLARGINE 100 [IU]/ML
20 INJECTION, SOLUTION SUBCUTANEOUS
Qty: 15 ML | Refills: 5 | Status: SHIPPED | OUTPATIENT
Start: 2021-07-28 | End: 2021-12-06 | Stop reason: SDUPTHER

## 2021-07-28 RX ORDER — LANCETS
EACH MISCELLANEOUS
Qty: 200 EACH | Refills: 3 | Status: SHIPPED | OUTPATIENT
Start: 2021-07-28

## 2021-07-28 NOTE — PROGRESS NOTES
Les Culver is a 78 y.o. male here for   Chief Complaint   Patient presents with    Diabetes       1. Have you been to the ER, urgent care clinic since your last visit? Hospitalized since your last visit? -no    2. Have you seen or consulted any other health care providers outside of the [address] [place] [address] since your last visit? Include any pap smears or colon screening. -PCP

## 2021-07-28 NOTE — PROGRESS NOTES
Valeria Keenan MD            Patient Information  Date:7/28/2021  Name : Franisco Urias 78 y.o.     YOB: 1940               Chief Complaint   Patient presents with    Diabetes       History of Present Illness: Fransico Urias is a 78 y.o. male here for follow-up of  Type 2 Diabetes Mellitus. Self-referred for evaluation management of type 2 diabetes mellitus. He was managed by Dr. Linda Keene in the past  He is on metformin, lantus 20 units  He was asked to check blood glucose twice daily and monitor the food he is eating, he realized Sprite was increasing the blood glucose to 250 which he has stopped. Weight has been stable  Eating chocolate, ice cream at bedtime,  Diabetes was diagnosed in 2015, complains of tingling, pain in the feet. Evaluated by Dr. Dustin Triplett: Has peripheral neuropathy, sciatic radiculopathy, left leg weakness  Qualified for extra benefits based on  disability benefits          Eats 3 meals a day,    Wt Readings from Last 3 Encounters:   07/28/21 184 lb (83.5 kg)   07/21/21 184 lb (83.5 kg)   04/06/21 185 lb (83.9 kg)       BP Readings from Last 3 Encounters:   07/28/21 130/69   07/21/21 128/64   04/06/21 130/60           Past Medical History:   Diagnosis Date    Cataract     Chronic kidney disease, stage 3 ([address] 7/24/2019    stage 3a mild to moderate loss of kidney function    Coronary arteriosclerosis 6/29/2017    Coronary artery disease     Hyperlipidemia     Hypertension     Neuropathy     Type 2 diabetes mellitus (HCC)     Vitamin D deficiency      Current Outpatient Medications   Medication Sig    cyanocobalamin (VITAMIN B12) 500 mcg tablet Take 500 mcg by mouth daily.  insulin glargine (Lantus Solostar U-100 Insulin) 100 unit/mL (3 mL) inpn 20 Units by SubCUTAneous route nightly.  ezetimibe (ZETIA) 10 mg tablet Take 1 Tab by mouth daily.     lisinopril-hydroCHLOROthiazide (Karina Domhollis) 20-12.5 mg per tablet TAKE 1 TABLET BY MOUTH DAILY    DULoxetine (CYMBALTA) 20 mg capsule TAKE 1 CAPSULE BY MOUTH EVERY DAY    metFORMIN ER (GLUCOPHAGE XR) 500 mg tablet TAKE 2 TABLETS BY MOUTH TWICE DAILY    glucose blood VI test strips (Accu-Chek Patricia Plus test strp) strip USE TO TEST BLOOD SUGAR TWICE DAILY    Insulin Needles, Disposable, (ALICE PEN NEEDLE) 32 gauge x 5/32\" ndle Use to inject Lantus nightly. DX Code: E11.65    metoprolol succinate (TOPROL-XL) 200 mg XL tablet Take 1 Tab by mouth daily.  Blood-Glucose Meter monitoring kit Test BID Dx Code: E11.65    ergocalciferol (VITAMIN D2) 50,000 unit capsule Take 5,000 Units by mouth daily.  alip acid/biotin/mins16/herb91 (BLOOD SUGAR BALANCE PO) Take 1 Tab by mouth daily.  gabapentin (NEURONTIN) 100 mg capsule Take 1 Cap by mouth three (3) times daily. Max Daily Amount: 300 mg. (Patient not taking: Reported on 7/21/2021)     No current facility-administered medications for this visit. No Known Allergies    Review of Systems: Per HPI    Physical Examination:   Blood pressure 130/69, pulse 91, temperature 98.7 °F (37.1 °C), temperature source Temporal, resp. rate 20, height 5' 7\" (1.702 m), weight 184 lb (83.5 kg), SpO2 94 %. Estimated body mass index is 28.82 kg/m² as calculated from the following:    Height as of this encounter: 5' 7\" (1.702 m). -   Weight as of this encounter: 184 lb (83.5 kg).   - General: pleasant, no distress, good eye contact  - HEENT: no pallor, no periorbital edema, EOMI  - Neck: supple,  - Cardiovascular: regular,  normal S1 and S2,  - Respiratory: clear to auscultation bilaterally  -   - Musculoskeletal: no proximal muscle weakness in upper or lower extremities  -   - Neurological: alert and oriented  - Psychiatric: normal mood and affect  - Skin: color, texture, turgor normal.     Diabetic foot exam: March 2019    Left Foot:   Visual Exam: callous - Great toe   Pulse DP: 1+ (weak)   Filament test: reduced sensation    Vibratory sensation: diminished      Right Foot:   Visual Exam: normal    Pulse DP: 1+ (weak)   Filament test: reduced sensation    Vibratory sensation: diminished  Diabetic Foot and Eye Exam HM Status   Topic Date Due    Eye Exam  Never done    Diabetic Foot Care  07/21/2022         Data Reviewed:       Lab Results   Component Value Date/Time    Hemoglobin A1c 7.6 (H) 07/16/2021 12:00 AM    Hemoglobin A1c 6.7 (H) 03/16/2020 01:39 PM    Hemoglobin A1c, External 6.9 10/29/2019 12:00 AM    Hemoglobin A1c, External 6.9 10/28/2019 12:00 AM    Glucose 150 (H) 07/16/2021 12:00 AM    Glucose  07/12/2019 01:06 PM    Microalb/Creat ratio (ug/mg creat.) 6 07/16/2021 12:00 AM    LDL,Direct 118 (H) 03/16/2020 01:39 PM    LDL, calculated 107 (H) 03/16/2021 01:54 PM    Creatinine 1.39 (H) 07/16/2021 12:00 AM      Lab Results   Component Value Date/Time    GFR est non-AA 47 (L) 07/16/2021 12:00 AM    GFR est AA 54 (L) 07/16/2021 12:00 AM    Creatinine 1.39 (H) 07/16/2021 12:00 AM    BUN 19 07/16/2021 12:00 AM    Sodium 141 07/16/2021 12:00 AM    Potassium 4.4 07/16/2021 12:00 AM    Chloride 102 07/16/2021 12:00 AM    CO2 26 07/16/2021 12:00 AM       Assessment/Plan:     1. Type 2 diabetes mellitus with hyperglycemia, without long-term current use of insulin ([facility] [address] [facility]    2. Essential hypertension    3. Mixed hyperlipidemia        1. Type 2 Diabetes Mellitus   Lab Results   Component Value Date/Time    Hemoglobin A1c 7.6 (H) 07/16/2021 12:00 AM    Hemoglobin A1c (POC) 6.6 10/09/2020 10:03 AM    Hemoglobin A1c, External 6.9 10/29/2019 12:00 AM   A1c March 2021: 7.7    Discussed about protein snacks  Lantus 20 units  Annual eye exam, foot care        2. HTN : Continue current therapy     3. Hyperlipidemia : zetia     4. CAD  No calf claudication    5. Peripheral neuropathy: Followed by neurology    There are no Patient Instructions on file for this visit.       Thank you for allowing me to participate in the care of this patient. John Paul Trujillo MD      Patient verbalized understanding     Voice-recognition software was used to generate this report, which may result in some phonetic-based errors in the grammar and contents. Even though attempts were made to correct all the mistakes, some may have been missed and remained in the body of the report.

## 2021-09-04 DIAGNOSIS — I25.10 CORONARY ARTERIOSCLEROSIS: ICD-10-CM

## 2021-09-04 DIAGNOSIS — E78.2 MIXED HYPERLIPIDEMIA: ICD-10-CM

## 2021-09-04 RX ORDER — EZETIMIBE 10 MG/1
TABLET ORAL
Qty: 90 TABLET | Refills: 1 | Status: SHIPPED | OUTPATIENT
Start: 2021-09-04 | End: 2022-07-22

## 2021-10-07 ENCOUNTER — OFFICE VISIT (OUTPATIENT)
Dept: FAMILY MEDICINE CLINIC | Age: 81
End: 2021-10-07
Payer: MEDICARE

## 2021-10-07 VITALS
SYSTOLIC BLOOD PRESSURE: 122 MMHG | RESPIRATION RATE: 16 BRPM | HEART RATE: 65 BPM | HEIGHT: 67 IN | TEMPERATURE: 96.9 F | WEIGHT: 182 LBS | DIASTOLIC BLOOD PRESSURE: 62 MMHG | OXYGEN SATURATION: 98 % | BODY MASS INDEX: 28.56 KG/M2

## 2021-10-07 DIAGNOSIS — Z00.00 WELLNESS EXAMINATION: Primary | ICD-10-CM

## 2021-10-07 DIAGNOSIS — E11.65 TYPE 2 DIABETES MELLITUS WITH HYPERGLYCEMIA, WITHOUT LONG-TERM CURRENT USE OF INSULIN (HCC): ICD-10-CM

## 2021-10-07 DIAGNOSIS — M17.12 OSTEOARTHRITIS OF LEFT KNEE, UNSPECIFIED OSTEOARTHRITIS TYPE: ICD-10-CM

## 2021-10-07 DIAGNOSIS — I10 ESSENTIAL HYPERTENSION: ICD-10-CM

## 2021-10-07 DIAGNOSIS — I25.10 CORONARY ARTERIOSCLEROSIS: ICD-10-CM

## 2021-10-07 DIAGNOSIS — E11.42 DIABETIC POLYNEUROPATHY ASSOCIATED WITH TYPE 2 DIABETES MELLITUS (HCC): ICD-10-CM

## 2021-10-07 DIAGNOSIS — R19.7 DIARRHEA, UNSPECIFIED TYPE: ICD-10-CM

## 2021-10-07 PROCEDURE — G8419 CALC BMI OUT NRM PARAM NOF/U: HCPCS | Performed by: STUDENT IN AN ORGANIZED HEALTH CARE EDUCATION/TRAINING PROGRAM

## 2021-10-07 PROCEDURE — 3051F HG A1C>EQUAL 7.0%<8.0%: CPT | Performed by: STUDENT IN AN ORGANIZED HEALTH CARE EDUCATION/TRAINING PROGRAM

## 2021-10-07 PROCEDURE — 1101F PT FALLS ASSESS-DOCD LE1/YR: CPT | Performed by: STUDENT IN AN ORGANIZED HEALTH CARE EDUCATION/TRAINING PROGRAM

## 2021-10-07 PROCEDURE — G8754 DIAS BP LESS 90: HCPCS | Performed by: STUDENT IN AN ORGANIZED HEALTH CARE EDUCATION/TRAINING PROGRAM

## 2021-10-07 PROCEDURE — G0439 PPPS, SUBSEQ VISIT: HCPCS | Performed by: STUDENT IN AN ORGANIZED HEALTH CARE EDUCATION/TRAINING PROGRAM

## 2021-10-07 PROCEDURE — G8536 NO DOC ELDER MAL SCRN: HCPCS | Performed by: STUDENT IN AN ORGANIZED HEALTH CARE EDUCATION/TRAINING PROGRAM

## 2021-10-07 PROCEDURE — G8510 SCR DEP NEG, NO PLAN REQD: HCPCS | Performed by: STUDENT IN AN ORGANIZED HEALTH CARE EDUCATION/TRAINING PROGRAM

## 2021-10-07 PROCEDURE — G8427 DOCREV CUR MEDS BY ELIG CLIN: HCPCS | Performed by: STUDENT IN AN ORGANIZED HEALTH CARE EDUCATION/TRAINING PROGRAM

## 2021-10-07 PROCEDURE — G8752 SYS BP LESS 140: HCPCS | Performed by: STUDENT IN AN ORGANIZED HEALTH CARE EDUCATION/TRAINING PROGRAM

## 2021-10-07 RX ORDER — SAME BUTANEDISULFONATE/BETAINE 400-600 MG
250 POWDER IN PACKET (EA) ORAL 2 TIMES DAILY
Qty: 60 CAPSULE | Refills: 0 | Status: SHIPPED | OUTPATIENT
Start: 2021-10-07 | End: 2021-11-06

## 2021-10-07 NOTE — PROGRESS NOTES
This is the Subsequent Medicare Annual Wellness Exam, performed 12 months or more after the Initial AWV or the last Subsequent AWV    I have reviewed the patient's medical history in detail and updated the computerized patient record. Assessment/Plan   Education and counseling provided:  Are appropriate based on today's review and evaluation  End-of-Life planning (with patient's consent)  Vaccine Schedule    1. Wellness examination  2. Diarrhea, unspecified type  -     Saccharomyces boulardii (Florastor) 250 mg capsule; Take 1 Capsule by mouth two (2) times a day for 30 days. , Normal, Disp-60 Capsule, R-0  3. Type 2 diabetes mellitus with hyperglycemia, without long-term current use of insulin ([facility] [address]  4. Coronary arteriosclerosis  5. Diabetic polyneuropathy associated with type 2 diabetes mellitus ([facility] [address]  6. Essential hypertension  7. Osteoarthritis of left knee, unspecified osteoarthritis type  Wellnessdiscussed HCM and updated. Patient refuses all vaccinations including flu vaccine shingles vaccine tetanus vaccine pneumonia vaccine, covid vaccine. He just does not believe that Covid is as serious as everybody is making it out to be. Had a long conversation with him and explained the importance of these vaccinations especially the covid vaccine as he has underlying chronic conditions. DiarrheaI offered to do some stool studies but he refused at this time. He is okay with trying a probiotic and Metamucil. Metamucil has helped him in the past.  Chronic conditionscontinue current management. Continue following with specialist.  Reviewed recent labs. Fasting labs next visit. HLDlast visit we will start her on Zetia. Fasting labs next visit. Left knee painfollows with orthopedic surgery      Follow-up and Dispositions    · Return in about 6 months (around 4/7/2022) for Chronic Conditions, fasting labs for HLD. Discusses his Covid vaccine hesitancy. .         Depression Risk Factor Screening     3 most recent PHQ Screens 10/7/2021   Little interest or pleasure in doing things Not at all   Feeling down, depressed, irritable, or hopeless Not at all   Total Score PHQ 2 0       Alcohol Risk Screen    Do you average more than 1 drink per night or more than 7 drinks a week: No    In the past three months have you have had more than 4 drinks containing alcohol on one occasion: No        Functional Ability and Level of Safety    Hearing: Used multiple hearung aides** since 1975      Activities of Daily Living: The home contains: no safety equipment. will be geting handrails, no rugs,has good lighting  Patient does total self care      Ambulation: with no difficulty     Fall Risk:  Fall Risk Assessment, last 12 mths 10/7/2021   Able to walk? Yes   Fall in past 12 months? 0   Do you feel unsteady? 0   Are you worried about falling 0   Is TUG test greater than 12 seconds?  -   Is the gait abnormal? -   Number of falls in past 12 months -      Abuse Screen:  Patient is not abused       Cognitive Screening    Has your family/caregiver stated any concerns about your memory: yes - takes prevagen     Cognitive Screening: Normal -mini cog5/5    Health Maintenance Due     Health Maintenance Due   Topic Date Due    Eye Exam Retinal or Dilated  Never done    Shingrix Vaccine Age 50> (1 of 2) Never done    DTaP/Tdap/Td series (1 - Tdap) 01/02/2001    Pneumococcal 65+ years (1 of 1 - PPSV23) Never done    Medicare Yearly Exam  Never done    COVID-19 Vaccine (2 - Pfizer 2-dose series) 02/12/2021    Flu Vaccine (1) Never done       Patient Care Team   Patient Care Team:  Cece Smith MD as PCP - General (Family Medicine)  Cece Smith MD as PCP - [facility] Empaneled Provider    History     Patient Active Problem List   Diagnosis Code    Type 2 diabetes mellitus with hyperglycemia, without long-term current use of insulin ([facility] [address] E11.65    Essential hypertension I10    Mixed hyperlipidemia E78.2    Hereditary and idiopathic neuropathy, 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    Vitamin D deficiency E55.9    Polyneuropathy, unspecified G62.9    Coronary arteriosclerosis I25.10    Chronic kidney disease, stage 3 (HCC) N18.30     Past Medical History:   Diagnosis Date    Cataract     Chronic kidney disease, stage 3 (HCC) 7/24/2019    stage 3a mild to moderate loss of kidney function    Coronary arteriosclerosis 6/29/2017    Coronary artery disease     Hyperlipidemia     Hypertension     Neuropathy     Type 2 diabetes mellitus ([facility] [address]     Vitamin D deficiency       Past Surgical History:   Procedure Laterality Date    HX CATARACT REMOVAL Bilateral     HX COLONOSCOPY  2005     Current Outpatient Medications   Medication Sig Dispense Refill    ezetimibe (ZETIA) 10 mg tablet TAKE 1 TABLET BY MOUTH DAILY 90 Tablet 1    insulin glargine (Lantus Solostar U-100 Insulin) 100 unit/mL (3 mL) inpn 20 Units by SubCUTAneous route nightly. 15 mL 5    lancets misc USE TO TEST BLOOD SUGAR TWICE DAILY Dx Code: E11.65 200 Each 3    cyanocobalamin (VITAMIN B12) 500 mcg tablet Take 500 mcg by mouth daily.  lisinopril-hydroCHLOROthiazide (PRINZIDE, ZESTORETIC) 20-12.5 mg per tablet TAKE 1 TABLET BY MOUTH DAILY 90 Tab 3    DULoxetine (CYMBALTA) 20 mg capsule TAKE 1 CAPSULE BY MOUTH EVERY DAY 90 Cap 0    metFORMIN ER (GLUCOPHAGE XR) 500 mg tablet TAKE 2 TABLETS BY MOUTH TWICE DAILY 360 Tab 3    glucose blood VI test strips (Accu-Chek Patricia Plus test strp) strip USE TO TEST BLOOD SUGAR TWICE DAILY 100 Strip 5    Insulin Needles, Disposable, (ALICE PEN NEEDLE) 32 gauge x 5/32\" ndle Use to inject Lantus nightly. DX Code: E11.65 100 Pen Needle 3    metoprolol succinate (TOPROL-XL) 200 mg XL tablet Take 1 Tab by mouth daily. 90 Tab 3    ergocalciferol (VITAMIN D2) 50,000 unit capsule Take 5,000 Units by mouth daily.  alip acid/biotin/mins16/herb91 (BLOOD SUGAR BALANCE PO) Take 1 Tab by mouth daily.       Blood-Glucose Meter monitoring kit Test BID Dx Code: E11.65 (Patient not taking: Reported on 10/7/2021) 1 Kit 0     No Known Allergies    Family History   Problem Relation Age of Onset    Cancer Mother         stomach    Heart Disease Father     Hypertension Father     Cancer Brother         brain tumor     Social History     Tobacco Use    Smoking status: Former Smoker     Packs/day: 2.00     Years: 20.00     Pack years: 40.00     Types: Cigarettes     Quit date:      Years since quittin.7    Smokeless tobacco: Never Used   Substance Use Topics    Alcohol use: No       Diarrheapatient has diarrhea which comes and goes he had this for couple years. He has used Metamucil in the past which is helped. He also noted a green mucus in his diarrhea. Denies abdominal pain. Follows with endocrinology for his diabetes, has history of CAD and HTN and follows with cardiology. Has neuropathy secondary to diabetes currently on gabapentin for this. Has left knee pain with sounds like arthritis. Follows with orthopedic surgery. Have offered surgery but for now he prefers to hold off. Gets periodic steroid shots in the knee. Last visit triglycerides and cholesterols elevated. He was started on Zetia. Patient ate prior to today's visit. History of CAD, DM, left knee pain. Follows with specialist.  Vikram Ricketts with the  [facility] as well.     Johnathon Espinoza MD .

## 2021-10-07 NOTE — PATIENT INSTRUCTIONS
Medicare Wellness Visit, Male    The best way to live healthy is to have a lifestyle where you eat a well-balanced diet, exercise regularly, limit alcohol use, and quit all forms of tobacco/nicotine, if applicable. Regular preventive services are another way to keep healthy. Preventive services (vaccines, screening tests, monitoring & exams) can help personalize your care plan, which helps you manage your own care. Screening tests can find health problems at the earliest stages, when they are easiest to treat. [facility] follows the current, evidence-based guidelines published by the [facility] ([facility]) when recommending preventive services for our patients. Because we follow these guidelines, sometimes recommendations change over time as research supports it. (For example, a prostate screening blood test is no longer routinely recommended for men with no symptoms). Of course, you and your doctor may decide to screen more often for some diseases, based on your risk and co-morbidities (chronic disease you are already diagnosed with). Preventive services for you include:  - Medicare offers their members a free annual wellness visit, which is time for you and your primary care provider to discuss and plan for your preventive service needs. Take advantage of this benefit every year!  -All adults over age 72 should receive the recommended pneumonia vaccines. Current USPSTF guidelines recommend a series of two vaccines for the best pneumonia protection.   -All adults should have a flu vaccine yearly and tetanus vaccine every 10 years.  -All adults age 48 and older should receive the shingles vaccines (series of two vaccines).        -All adults age 38-68 who are overweight should have a diabetes screening test once every three years.   -Other screening tests & preventive services for persons with diabetes include: an eye exam to screen for diabetic retinopathy, a kidney function test, a foot exam, and stricter control over your cholesterol.   -Cardiovascular screening for adults with routine risk involves an electrocardiogram (ECG) at intervals determined by the provider.   -Colorectal cancer screening should be done for adults age 54-65 with no increased risk factors for colorectal cancer. There are a number of acceptable methods of screening for this type of cancer. Each test has its own benefits and drawbacks. Discuss with your provider what is most appropriate for you during your annual wellness visit. The different tests include: colonoscopy (considered the best screening method), a fecal occult blood test, a fecal DNA test, and sigmoidoscopy.  -All adults born between [facility] should be screened once for Hepatitis C.  -An Abdominal Aortic Aneurysm (AAA) Screening is recommended for men age 73-68 who has ever smoked in their lifetime. Here is a list of your current Health Maintenance items (your personalized list of preventive services) with a due date:  Health Maintenance Due   Topic Date Due    Eye Exam  Never done    Shingles Vaccine (1 of 2) Never done    DTaP/Tdap/Td  (1 - Tdap) 01/02/2001    Pneumococcal Vaccine (1 of 1 - PPSV23) Never done    Annual Well Visit  Never done    COVID-19 Vaccine (2 - Pfizer 2-dose series) 02/12/2021    Yearly Flu Vaccine (1) Never done            Advance Care Planning: Care Instructions  Your Care Instructions     It can be hard to live with an illness that cannot be cured. But if your health is getting worse, you may want to make decisions about end-of-life care. Planning for the end of your life does not mean that you are giving up. It is a way to make sure that your wishes are met. Clearly stating your wishes can make it easier for your loved ones. Making plans while you are still able may also ease your mind and make your final days less stressful and more meaningful.   Follow-up care is a key part of your treatment and safety. Be sure to make and go to all appointments, and call your doctor if you are having problems. It's also a good idea to know your test results and keep a list of the medicines you take. What can you do to plan for the end of life? · You can bring these issues up with your doctor. You do not need to wait until your doctor starts the conversation. You might start with, \"What makes life worth living for me is. Jackie Escamilla \" And then follow it with, \"I would not be willing to live with . Jackie Francine Gibbse Francine Jackie Francine \" When you complete this sentence it helps your doctor understand your wishes. · Talk openly and honestly with your doctor. This is the best way to understand the decisions you will need to make as your health changes. Know that you can always change your mind. · Ask your doctor about commonly used life-support measures. These include tube feedings, breathing machines, and fluids given through a vein (IV). Understanding these treatments will help you decide whether you want them. · You may choose to have these life-supporting treatments for a limited time. This allows a trial period to see whether they will help you. You may also decide that you want your doctor to take only certain measures to keep you alive. It may help to think about the big picture, like what makes life worth living for you or what your values and goals are. · Talk to your doctor about how long you are likely to live. Your doctor may be able to give you an idea of what usually happens with your specific illness. · Think about preparing papers that state your wishes. These papers are called advance directives. If you do this early and review them often, there will not be any confusion about what you want. You can change your instructions at any time. Which papers should you prepare? Advance directives are legal papers that tell doctors how you want to be cared for at the end of your life. You do not need a  to write these papers.  Ask your doctor or your state health department for information on how to write your advance directives. They may have the forms for each of these types of papers. Make sure your doctor has a copy of these on file, and give a copy to a family member or close friend. · Consider a do-not-resuscitate order (DNR). This order asks that no extra treatments be done if your heart stops or you stop breathing. Extra treatments may include cardiopulmonary resuscitation (CPR), electrical shock to restart your heart, or a machine to breathe for you. If you decide to have a DNR order, ask your doctor to explain and write it. Place the order in your home where everyone can easily see it. · Consider a living will. A living will explains your wishes about life support and other treatments at the end of your life if you become unable to speak for yourself. Living yeager tell doctors to use or not use treatments that would keep you alive. You must have one or two witnesses or a notary present when you sign this form. A living will may be called something else in your state. · Consider a medical power of . This form allows you to name a person to make decisions about your care if you are not able to. Most people ask a close friend or family member. Talk to this person about the kinds of treatments you want and those that you do not want. Make sure this person understands your wishes. A medical power of  may be called something else in your state. These legal papers are simple to change. Tell your doctor what you want to change, and ask him or her to make a note in your medical file. Give your family updated copies of the papers. Where can you learn more? Go to http://www.jacobsen.com/  Enter P184 in the search box to learn more about \"Advance Care Planning: Care Instructions. \"  Current as of: March 17, 2021               Content Version: 13.0  © 6552-5939 Healthwise, [facility].    Care instructions adapted under license by [employer] (which disclaims liability or warranty for this information). If you have questions about a medical condition or this instruction, always ask your healthcare professional. [address] any warranty or liability for your use of this information.

## 2021-10-07 NOTE — PROGRESS NOTES
1. Have you been to the ER, urgent care clinic since your last visit? Hospitalized since your last visit? No    2. Have you seen or consulted any other health care providers outside of the [address] [place] [address] since your last visit? Include any pap smears or colon screening.  No   Visit Vitals  /62 (BP 1 Location: Right arm, BP Patient Position: Sitting)   Pulse 65   Temp 96.9 °F (36.1 °C) (Axillary)   Resp 16   Ht 5' 7\" (1.702 m)   Wt 182 lb (82.6 kg)   SpO2 98%   BMI 28.51 kg/m²     Chief Complaint   Patient presents with    Follow-up

## 2021-11-05 DIAGNOSIS — E11.65 TYPE 2 DIABETES MELLITUS WITH HYPERGLYCEMIA, WITHOUT LONG-TERM CURRENT USE OF INSULIN (HCC): ICD-10-CM

## 2021-11-05 RX ORDER — BLOOD SUGAR DIAGNOSTIC
STRIP MISCELLANEOUS
Qty: 100 STRIP | Refills: 5 | Status: SHIPPED | OUTPATIENT
Start: 2021-11-05 | End: 2022-07-19 | Stop reason: SDUPTHER

## 2021-11-23 ENCOUNTER — OFFICE VISIT (OUTPATIENT)
Dept: ORTHOPEDIC SURGERY | Age: 81
End: 2021-11-23

## 2021-11-23 VITALS — HEIGHT: 67 IN | BODY MASS INDEX: 28.25 KG/M2 | WEIGHT: 180 LBS

## 2021-11-23 DIAGNOSIS — M25.562 LEFT KNEE PAIN, UNSPECIFIED CHRONICITY: Primary | ICD-10-CM

## 2021-11-23 DIAGNOSIS — M17.12 PRIMARY OSTEOARTHRITIS OF LEFT KNEE: ICD-10-CM

## 2021-11-23 PROCEDURE — G8427 DOCREV CUR MEDS BY ELIG CLIN: HCPCS | Performed by: ORTHOPAEDIC SURGERY

## 2021-11-23 PROCEDURE — G8756 NO BP MEASURE DOC: HCPCS | Performed by: ORTHOPAEDIC SURGERY

## 2021-11-23 PROCEDURE — G8432 DEP SCR NOT DOC, RNG: HCPCS | Performed by: ORTHOPAEDIC SURGERY

## 2021-11-23 PROCEDURE — G8419 CALC BMI OUT NRM PARAM NOF/U: HCPCS | Performed by: ORTHOPAEDIC SURGERY

## 2021-11-23 PROCEDURE — 20611 DRAIN/INJ JOINT/BURSA W/US: CPT | Performed by: ORTHOPAEDIC SURGERY

## 2021-11-23 PROCEDURE — G8536 NO DOC ELDER MAL SCRN: HCPCS | Performed by: ORTHOPAEDIC SURGERY

## 2021-11-23 PROCEDURE — 1101F PT FALLS ASSESS-DOCD LE1/YR: CPT | Performed by: ORTHOPAEDIC SURGERY

## 2021-11-23 PROCEDURE — 99203 OFFICE O/P NEW LOW 30 MIN: CPT | Performed by: ORTHOPAEDIC SURGERY

## 2021-11-23 RX ORDER — LIDOCAINE HYDROCHLORIDE 10 MG/ML
9 INJECTION INFILTRATION; PERINEURAL ONCE
Status: COMPLETED | OUTPATIENT
Start: 2021-11-23 | End: 2021-11-23

## 2021-11-23 RX ORDER — TRIAMCINOLONE ACETONIDE 40 MG/ML
40 INJECTION, SUSPENSION INTRA-ARTICULAR; INTRAMUSCULAR ONCE
Status: COMPLETED | OUTPATIENT
Start: 2021-11-23 | End: 2021-11-23

## 2021-11-23 RX ADMIN — LIDOCAINE HYDROCHLORIDE 9 ML: 10 INJECTION INFILTRATION; PERINEURAL at 13:54

## 2021-11-23 RX ADMIN — TRIAMCINOLONE ACETONIDE 40 MG: 40 INJECTION, SUSPENSION INTRA-ARTICULAR; INTRAMUSCULAR at 13:54

## 2021-11-23 NOTE — PROGRESS NOTES
Name: Kelle Velazco    : 1940     Service Dept: [facility]    Chief Complaint   Patient presents with    Knee Pain     Left        Visit Vitals  Ht 5' 7\" (1.702 m)   Wt 180 lb (81.6 kg)   BMI 28.19 kg/m²        No Known Allergies     Current Outpatient Medications   Medication Sig Dispense Refill    Accu-Chek Patricia Plus test strp strip USE TO TEST BLOOD SUGAR TWICE DAILY 100 Strip 5    ezetimibe (ZETIA) 10 mg tablet TAKE 1 TABLET BY MOUTH DAILY 90 Tablet 1    insulin glargine (Lantus Solostar U-100 Insulin) 100 unit/mL (3 mL) inpn 20 Units by SubCUTAneous route nightly. 15 mL 5    lancets misc USE TO TEST BLOOD SUGAR TWICE DAILY Dx Code: E11.65 200 Each 3    cyanocobalamin (VITAMIN B12) 500 mcg tablet Take 500 mcg by mouth daily.  lisinopril-hydroCHLOROthiazide (PRINZIDE, ZESTORETIC) 20-12.5 mg per tablet TAKE 1 TABLET BY MOUTH DAILY 90 Tab 3    DULoxetine (CYMBALTA) 20 mg capsule TAKE 1 CAPSULE BY MOUTH EVERY DAY 90 Cap 0    metFORMIN ER (GLUCOPHAGE XR) 500 mg tablet TAKE 2 TABLETS BY MOUTH TWICE DAILY 360 Tab 3    Insulin Needles, Disposable, (ALICE PEN NEEDLE) 32 gauge x 5/32\" ndle Use to inject Lantus nightly. DX Code: E11.65 100 Pen Needle 3    metoprolol succinate (TOPROL-XL) 200 mg XL tablet Take 1 Tab by mouth daily. 90 Tab 3    Blood-Glucose Meter monitoring kit Test BID Dx Code: E11.65 (Patient not taking: Reported on 10/7/2021) 1 Kit 0    ergocalciferol (VITAMIN D2) 50,000 unit capsule Take 5,000 Units by mouth daily.  alip acid/biotin/mins16/herb91 (BLOOD SUGAR BALANCE PO) Take 1 Tab by mouth daily.        Current Facility-Administered Medications   Medication Dose Route Frequency Provider Last Rate Last Admin    lidocaine (XYLOCAINE) 10 mg/mL (1 %) injection 9 mL  9 mL Other ONCE Gavino Mehta MD        triamcinolone acetonide (KENALOG-40) 40 mg/mL injection 40 mg  40 mg Intra artICUlar ONCE Gina De Leon MD Patient Active Problem List   Diagnosis Code    Type 2 diabetes mellitus with hyperglycemia, without long-term current use of insulin (HCC) E11.65    Essential hypertension I10    Mixed hyperlipidemia E78.2    Hereditary and idiopathic neuropathy, 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    Vitamin D deficiency E55.9    Polyneuropathy, unspecified G62.9    Coronary arteriosclerosis I25.10    Chronic kidney disease, stage 3 (HCC) N18.30    Osteoarthritis of left knee M17.12      Family History   Problem Relation Age of Onset    Cancer Mother         stomach    Heart Disease Father     Hypertension Father     Cancer Brother         brain tumor      Social History     Socioeconomic History    Marital status:    Tobacco Use    Smoking status: Former Smoker     Packs/day: 2.00     Years: 20.00     Pack years: 40.00     Types: Cigarettes     Quit date: 26     Years since quittin.9    Smokeless tobacco: Never Used   Vaping Use    Vaping Use: Never used   Substance and Sexual Activity    Alcohol use: No    Drug use: No      Past Surgical History:   Procedure Laterality Date    HX CATARACT REMOVAL Bilateral     HX COLONOSCOPY        Past Medical History:   Diagnosis Date    Cataract     Chronic kidney disease, stage 3 ([facility] [address] 2019    stage 3a mild to moderate loss of kidney function    Coronary arteriosclerosis 2017    Coronary artery disease     Hyperlipidemia     Hypertension     Neuropathy     Type 2 diabetes mellitus (HCC)     Vitamin D deficiency         I have reviewed and agree with [address] [place] [address] Nw and ROS and intake form in chart and the record furthermore I have reviewed prior medical record(s) regarding this patients care during this appointment.      Review of Systems:   Patient is a pleasant appearing individual, appropriately dressed, well hydrated, well nourished, who is alert, appropriately oriented for age, and in no acute distress with a normal gait and normal affect who does not appear to be in any significant pain. Physical Exam:  Left Knee -Decrease range of motion with flexion, Knee arc of greater than 50 degrees, Some crepitation, Grossly neurovascularly intact, Good cap refill, No skin lesion, Moderate swelling, No gross instability, Some quadriceps weakness, Kellgren and Rob at least grade 3    Right Knee - Full Range of Motion, No crepitation, Grossly neurovascularly intact, Good cap refill, No skin lesion, No swelling, No gross instability, No quadriceps weakness    Procedure Documentation:    I discussed in detail the risks, benefits and complications of an injection which included but are not limited to infection, skin reactions, hot swollen joint, and anaphylaxis with the patient. The patient verbalized understanding and gave informed consent for the injection. The patient's knee was flexed to 90° and the skin prepped using sterile alcohol solution. A sterile needle was inserted into the left knee and the mixture of 9 mL Lidocaine 1%, 1 mL Kenalog 40 mg was injected under sterile technique. The needle was withdrawn and the puncture site sealed with a Band-Aid. Technique: Under sterile conditions a CausePlay ultrasound unit with a variable frequency (7.0-14.0 MHz) linear transducer was used to localize the placement of needle into the left knee joint. Findings: Successful needle placement for knee injection. Final images were taken and saved for permanent record. The patient tolerated the injection well. The patient was instructed to call the office immediately if there is any pain, redness, warmth, fever, or chills. Encounter Diagnoses     ICD-10-CM ICD-9-CM   1. Left knee pain, unspecified chronicity  M25.562 719.46   2. Primary osteoarthritis of left knee  M17.12 715.16       HPI:  The patient is here with a chief complaint of left knee pain, sharp, throbbing pain, progressively getting worse. Nothing has helped. Pain is 6/10.     X-rays of the left knee are unremarkable except for severe OA. Assessment/Plan:  Plan will be for left knee cortisone injection today. We will see him back as needed. If he is not better, we may consider talking to the patient about knee replacement, but because of his age of 78, it will be inpatient stay, and we have to thoroughly work him up and go from there. As part of continued conservative pain management options the patient was advised to utilize Tylenol or OTC NSAIDS as long as it is not medically contraindicated. Return to Office: Follow-up and Dispositions    · Return for PRN. Scribed by Elidia Amaya as dictated by Li Galicia. Daniel Starkey MD.  Documentation True and Accepted Gavino Starkey MD

## 2021-11-23 NOTE — PATIENT INSTRUCTIONS
Knee Arthritis: Care Instructions  Your Care Instructions     Knee arthritis is a breakdown of the cartilage that cushions your knee joint. When the cartilage wears down, your bones rub against each other. This causes pain and stiffness. Knee arthritis tends to get worse with time. Treatment for knee arthritis involves reducing pain, making the leg muscles stronger, and staying at a healthy body weight. The treatment usually does not improve the health of the cartilage, but it can reduce pain and improve how well your knee works. You can take simple measures to protect your knee joints, ease your pain, and help you stay active. Follow-up care is a key part of your treatment and safety. Be sure to make and go to all appointments, and call your doctor if you are having problems. It's also a good idea to know your test results and keep a list of the medicines you take. How can you care for yourself at home? · Know that knee arthritis will cause more pain on some days than on others. · Stay at a healthy weight. Lose weight if you are overweight. When you stand up, the pressure on your knees from every pound of body weight is multiplied four times. So if you lose 10 pounds, you will reduce the pressure on your knees by 40 pounds. · Talk to your doctor or physical therapist about exercises that will help ease joint pain. ? Stretch to help prevent stiffness and to prevent injury before you exercise. You may enjoy gentle forms of yoga to help keep your knee joints and muscles flexible. ? Walk instead of jog.  ? Ride a bike. This makes your thigh muscles stronger and takes pressure off your knee. ? Wear well-fitting and comfortable shoes. ? Exercise in chest-deep water. This can help you exercise longer with less pain. ? Avoid exercises that include squatting or kneeling. They can put a lot of strain on your knees.   ? Talk to your doctor to make sure that the exercise you do is not making the arthritis worse.  · Do not sit for long periods of time. Try to walk once in a while to keep your knee from getting stiff. · Ask your doctor or physical therapist whether shoe inserts may reduce your arthritis pain. · If you can afford it, get new athletic shoes at least every year. This can help reduce the strain on your knees. · Use a device to help you do everyday activities. ? A cane or walking stick can help you keep your balance when you walk. Hold the cane or walking stick in the hand opposite the painful knee. ? If you feel like you may fall when you walk, try using crutches or a front-wheeled walker. These can prevent falls that could cause more damage to your knee. ? A knee brace may help keep your knee stable and prevent pain. ? You also can use other things to make life easier, such as a higher toilet seat and handrails in the bathtub or shower. · Take pain medicines exactly as directed. ? Do not wait until you are in severe pain. You will get better results if you take it sooner. ? If you are not taking a prescription pain medicine, take an over-the-counter medicine such as acetaminophen (Tylenol), ibuprofen (Advil, Motrin), or naproxen (Aleve). Read and follow all instructions on the label. ? Do not take two or more pain medicines at the same time unless the doctor told you to. Many pain medicines have acetaminophen, which is Tylenol. Too much acetaminophen (Tylenol) can be harmful. ? Tell your doctor if you take a blood thinner, have diabetes, or have allergies to shellfish. · Ask your doctor if you might benefit from a shot of steroid medicine into your knee. This may provide pain relief for several months. · Many people take the supplements glucosamine and chondroitin for osteoarthritis. Some people feel they help, but the medical research does not show that they work. Talk to your doctor before you take these supplements. When should you call for help?    Call your doctor now or seek immediate medical care if:    · You have sudden swelling, warmth, or pain in your knee.     · You have knee pain and a fever or rash.     · You have such bad pain that you cannot use your knee. Watch closely for changes in your health, and be sure to contact your doctor if you have any problems. Where can you learn more? Go to http://www.gray.com/  Enter W187 in the search box to learn more about \"Knee Arthritis: Care Instructions. \"  Current as of: April 30, 2021               Content Version: 13.0  © 4203-7964 [employer]. Care instructions adapted under license by [employer] (which disclaims liability or warranty for this information). If you have questions about a medical condition or this instruction, always ask your healthcare professional. [address] any warranty or liability for your use of this information.

## 2021-11-23 NOTE — LETTER
John Justineerrol   1940   [id number]       11/23/2021       I hereby authorize and direct Gavino Lujan MD, Gorge Lo, and whomever he may designate as his associate to perform upon myself the following procedure:    Injection of: Kenalog, Left Knee. Room 3  If any unforeseen condition arises in the course of the procedure, I further authorize him and his associated and/or assistant(s) to do whatever he/she deems advisable. The nature, purpose, benefits, risks, side effects, likelihood of achieving goals, and potential problems that might occur during recuperation, risks for not receiving the proposed care, treatment and services and alternatives of the procedure have been fully explained to me by my physician including, but not limited to:    Swelling, joint pain, skin pigment changes, worsening of condition, and failure to improve. I acknowledge that no guarantee or assurance has been made to me as to the results that may be obtained or the likelihood of success.                 _______________________________________     Signature of patient or authorized representative                [employer] [facility] fax: [phone]

## 2021-11-23 NOTE — LETTER
11/24/2021    Patient: John Frank   YOB: 1940   Date of Visit: 11/23/2021     Burgess Coburn, [address] [facility]  [address] [facility] [address]  [address] [address] [place] [id number] [facility]    Dear Burgess Irish MD,      Thank you for referring Mr. Dakota Zaragoza to [address] [place] [address] for evaluation. My notes for this consultation are attached. If you have questions, please do not hesitate to call me. I look forward to following your patient along with you.       Sincerely,    Jackson Reid MD

## 2021-11-30 LAB
ALBUMIN/CREAT UR: 7 MG/G CREAT (ref 0–29)
BUN SERPL-MCNC: 21 MG/DL (ref 8–27)
BUN/CREAT SERPL: 15 (ref 10–24)
CALCIUM SERPL-MCNC: 9 MG/DL (ref 8.6–10.2)
CHLORIDE SERPL-SCNC: 109 MMOL/L (ref 96–106)
CO2 SERPL-SCNC: 22 MMOL/L (ref 20–29)
CREAT SERPL-MCNC: 1.43 MG/DL (ref 0.76–1.27)
CREAT UR-MCNC: 152.3 MG/DL
EST. AVERAGE GLUCOSE BLD GHB EST-MCNC: 180 MG/DL
GLUCOSE SERPL-MCNC: 252 MG/DL (ref 65–99)
HBA1C MFR BLD: 7.9 % (ref 4.8–5.6)
INTERPRETATION: NORMAL
MICROALBUMIN UR-MCNC: 10.9 UG/ML
POTASSIUM SERPL-SCNC: 4.5 MMOL/L (ref 3.5–5.2)
SODIUM SERPL-SCNC: 146 MMOL/L (ref 134–144)

## 2021-12-03 ENCOUNTER — TELEPHONE (OUTPATIENT)
Dept: ORTHOPEDIC SURGERY | Age: 81
End: 2021-12-03

## 2021-12-03 DIAGNOSIS — M25.562 CHRONIC PAIN OF LEFT KNEE: Primary | ICD-10-CM

## 2021-12-03 DIAGNOSIS — M17.12 PRIMARY OSTEOARTHRITIS OF LEFT KNEE: ICD-10-CM

## 2021-12-03 DIAGNOSIS — G89.29 CHRONIC PAIN OF LEFT KNEE: Primary | ICD-10-CM

## 2021-12-03 NOTE — TELEPHONE ENCOUNTER
Patient would like to schedule the surgery. Plan will be for left knee cortisone injection today. We will see him back as needed. If he is not better, we may consider talking to the patient about knee replacement, but because of his age of 78, it will be inpatient stay, and we have to thoroughly work him up and go from there.

## 2021-12-06 ENCOUNTER — OFFICE VISIT (OUTPATIENT)
Dept: ENDOCRINOLOGY | Age: 81
End: 2021-12-06
Payer: MEDICARE

## 2021-12-06 VITALS
WEIGHT: 188.2 LBS | TEMPERATURE: 98.9 F | BODY MASS INDEX: 29.54 KG/M2 | DIASTOLIC BLOOD PRESSURE: 68 MMHG | HEIGHT: 67 IN | SYSTOLIC BLOOD PRESSURE: 140 MMHG | OXYGEN SATURATION: 95 % | HEART RATE: 77 BPM

## 2021-12-06 DIAGNOSIS — I10 ESSENTIAL HYPERTENSION: ICD-10-CM

## 2021-12-06 DIAGNOSIS — M25.562 CHRONIC PAIN OF LEFT KNEE: ICD-10-CM

## 2021-12-06 DIAGNOSIS — E11.65 TYPE 2 DIABETES MELLITUS WITH HYPERGLYCEMIA, WITHOUT LONG-TERM CURRENT USE OF INSULIN (HCC): ICD-10-CM

## 2021-12-06 DIAGNOSIS — G89.29 CHRONIC PAIN OF LEFT KNEE: ICD-10-CM

## 2021-12-06 DIAGNOSIS — E11.65 TYPE 2 DIABETES MELLITUS WITH HYPERGLYCEMIA, UNSPECIFIED WHETHER LONG TERM INSULIN USE (HCC): Primary | ICD-10-CM

## 2021-12-06 DIAGNOSIS — M17.12 PRIMARY OSTEOARTHRITIS OF LEFT KNEE: ICD-10-CM

## 2021-12-06 PROCEDURE — 1101F PT FALLS ASSESS-DOCD LE1/YR: CPT | Performed by: INTERNAL MEDICINE

## 2021-12-06 PROCEDURE — G8419 CALC BMI OUT NRM PARAM NOF/U: HCPCS | Performed by: INTERNAL MEDICINE

## 2021-12-06 PROCEDURE — G8432 DEP SCR NOT DOC, RNG: HCPCS | Performed by: INTERNAL MEDICINE

## 2021-12-06 PROCEDURE — 3051F HG A1C>EQUAL 7.0%<8.0%: CPT | Performed by: INTERNAL MEDICINE

## 2021-12-06 PROCEDURE — G8754 DIAS BP LESS 90: HCPCS | Performed by: INTERNAL MEDICINE

## 2021-12-06 PROCEDURE — G8753 SYS BP > OR = 140: HCPCS | Performed by: INTERNAL MEDICINE

## 2021-12-06 PROCEDURE — G8536 NO DOC ELDER MAL SCRN: HCPCS | Performed by: INTERNAL MEDICINE

## 2021-12-06 PROCEDURE — G8427 DOCREV CUR MEDS BY ELIG CLIN: HCPCS | Performed by: INTERNAL MEDICINE

## 2021-12-06 PROCEDURE — 99214 OFFICE O/P EST MOD 30 MIN: CPT | Performed by: INTERNAL MEDICINE

## 2021-12-06 RX ORDER — INSULIN GLARGINE 100 [IU]/ML
24 INJECTION, SOLUTION SUBCUTANEOUS
Qty: 15 ML | Refills: 5 | Status: SHIPPED | OUTPATIENT
Start: 2021-12-06 | End: 2022-02-02 | Stop reason: SDUPTHER

## 2021-12-06 NOTE — LETTER
12/7/2021    Patient: Dontrell Lloyd   YOB: 1940   Date of Visit: 12/6/2021     Cayla Espana MD  [address] [facility] [address]. [facility]    Dear Cayla Espana MD,      Thank you for referring Mr. Mary Fuchs to [address] [place] [address] for evaluation. My notes for this consultation are attached. If you have questions, please do not hesitate to call me. I look forward to following your patient along with you.       Sincerely,    Adria Weinberg MD

## 2021-12-06 NOTE — PROGRESS NOTES
Rola Gonzalez MD            Patient Information  Date:12/6/2021  Name : Dontrell Lloyd 78 y.o.     YOB: 1940               Chief Complaint   Patient presents with    Diabetes       History of Present Illness: Dontrell Lloyd is a 78 y.o. male here for follow-up of  Type 2 Diabetes Mellitus. Self-referred for evaluation management of type 2 diabetes mellitus. He was managed by Dr. Harsha Torrez in the past  He is on metformin, lantus 20 units  Checking the blood glucose, weight has been fluctuating  Prior history    Eating chocolate, ice cream at bedtime,  Diabetes was diagnosed in 2015, complains of tingling, pain in the feet. Evaluated by Dr. Giraldo Bears: Has peripheral neuropathy, sciatic radiculopathy, left leg weakness  Qualified for extra benefits based on  disability benefits          Eats 3 meals a day,    Wt Readings from Last 3 Encounters:   12/06/21 188 lb 3.2 oz (85.4 kg)   11/23/21 180 lb (81.6 kg)   10/07/21 182 lb (82.6 kg)       BP Readings from Last 3 Encounters:   12/06/21 (!) 140/68   10/07/21 122/62   07/28/21 130/69           Past Medical History:   Diagnosis Date    Cataract     Chronic kidney disease, stage 3 ([address] 7/24/2019    stage 3a mild to moderate loss of kidney function    Coronary arteriosclerosis 6/29/2017    Coronary artery disease     Hyperlipidemia     Hypertension     Neuropathy     Type 2 diabetes mellitus ([facility] [address]     Vitamin D deficiency      Current Outpatient Medications   Medication Sig    docosahexaenoic acid/epa (FISH OIL PO) Take  by mouth.  Accu-Chek Patricia Plus test strp strip USE TO TEST BLOOD SUGAR TWICE DAILY    insulin glargine (Lantus Solostar U-100 Insulin) 100 unit/mL (3 mL) inpn 20 Units by SubCUTAneous route nightly.  lancets misc USE TO TEST BLOOD SUGAR TWICE DAILY Dx Code: E11.65    cyanocobalamin (VITAMIN B12) 500 mcg tablet Take 500 mcg by mouth daily.     metFORMIN ER (GLUCOPHAGE XR) 500 mg tablet TAKE 2 TABLETS BY MOUTH TWICE DAILY    Insulin Needles, Disposable, (ALICE PEN NEEDLE) 32 gauge x 5/32\" ndle Use to inject Lantus nightly. DX Code: E11.65    ergocalciferol (VITAMIN D2) 50,000 unit capsule Take 5,000 Units by mouth daily.  ezetimibe (ZETIA) 10 mg tablet TAKE 1 TABLET BY MOUTH DAILY    lisinopril-hydroCHLOROthiazide (PRINZIDE, ZESTORETIC) 20-12.5 mg per tablet TAKE 1 TABLET BY MOUTH DAILY    DULoxetine (CYMBALTA) 20 mg capsule TAKE 1 CAPSULE BY MOUTH EVERY DAY    Blood-Glucose Meter monitoring kit Test BID Dx Code: E11.65 (Patient not taking: Reported on 10/7/2021)     No current facility-administered medications for this visit. No Known Allergies    Review of Systems: Per HPI    Physical Examination:   Blood pressure (!) 140/68, pulse 77, temperature 98.9 °F (37.2 °C), temperature source Temporal, height 5' 7\" (1.702 m), weight 188 lb 3.2 oz (85.4 kg), SpO2 95 %. Estimated body mass index is 29.48 kg/m² as calculated from the following:    Height as of this encounter: 5' 7\" (1.702 m). -   Weight as of this encounter: 188 lb 3.2 oz (85.4 kg).   - General: pleasant, no distress, good eye contact  - HEENT: no pallor, no periorbital edema, EOMI  - Neck: supple,  - Cardiovascular: regular,  normal S1 and S2,  - Respiratory: clear to auscultation bilaterally  -   - Musculoskeletal: no proximal muscle weakness in upper or lower extremities  -   - Neurological: alert and oriented  - Psychiatric: normal mood and affect  - Skin: color, texture, turgor normal.     Diabetic foot exam: March 2019    Left Foot:   Visual Exam: callous - Great toe   Pulse DP: 1+ (weak)   Filament test: reduced sensation    Vibratory sensation: diminished      Right Foot:   Visual Exam: normal    Pulse DP: 1+ (weak)   Filament test: reduced sensation    Vibratory sensation: diminished  Diabetic Foot and Eye Exam HM Status   Topic Date Due    Eye Exam  Never done    Diabetic Foot Care 07/28/2022         Data Reviewed:       Lab Results   Component Value Date/Time    Hemoglobin A1c 7.9 (H) 11/29/2021 12:00 AM    Hemoglobin A1c 7.6 (H) 07/16/2021 12:00 AM    Hemoglobin A1c 6.7 (H) 03/16/2020 01:39 PM    Hemoglobin A1c, External 6.9 10/29/2019 12:00 AM    Hemoglobin A1c, External 6.9 10/28/2019 12:00 AM    Glucose 252 (H) 11/29/2021 12:00 AM    Glucose  07/12/2019 01:06 PM    Microalb/Creat ratio (ug/mg creat.) 7 11/29/2021 12:00 AM    LDL,Direct 118 (H) 03/16/2020 01:39 PM    LDL, calculated 107 (H) 03/16/2021 01:54 PM    Creatinine 1.43 (H) 11/29/2021 12:00 AM      Lab Results   Component Value Date/Time    GFR est non-AA 45 (L) 11/29/2021 12:00 AM    GFR est AA 52 (L) 11/29/2021 12:00 AM    Creatinine 1.43 (H) 11/29/2021 12:00 AM    BUN 21 11/29/2021 12:00 AM    Sodium 146 (H) 11/29/2021 12:00 AM    Potassium 4.5 11/29/2021 12:00 AM    Chloride 109 (H) 11/29/2021 12:00 AM    CO2 22 11/29/2021 12:00 AM       Assessment/Plan:     1. Type 2 diabetes mellitus with hyperglycemia, unspecified whether long term insulin use ([facility] [address]    2. Essential hypertension        1. Type 2 Diabetes Mellitus   Lab Results   Component Value Date/Time    Hemoglobin A1c 7.9 (H) 11/29/2021 12:00 AM    Hemoglobin A1c (POC) 6.6 10/09/2020 10:03 AM    Hemoglobin A1c, External 6.9 10/29/2019 12:00 AM   A1c March 2021: 7.7  A1c November 2021 7.9  Discussed about protein snacks  Lantus 24 units  Annual eye exam, foot care        2. HTN : Continue current therapy     3. Hyperlipidemia : zetia     4. CAD  No calf claudication    5. Peripheral neuropathy: Followed by neurology    There are no Patient Instructions on file for this visit. Thank you for allowing me to participate in the care of this patient. Apurva Ferrell MD      Patient verbalized understanding     Voice-recognition software was used to generate this report, which may result in some phonetic-based errors in the grammar and contents.   Even though attempts were made to correct all the mistakes, some may have been missed and remained in the body of the report.

## 2021-12-06 NOTE — PROGRESS NOTES
Mariann Lozoya is a 78 y.o. male here for   Chief Complaint   Patient presents with    Diabetes       1. Have you been to the ER, urgent care clinic since your last visit? Hospitalized since your last visit? -no    2. Have you seen or consulted any other health care providers outside of the [address] [place] [address] since your last visit?   Include any pap smears or colon screening.-no

## 2022-01-23 DIAGNOSIS — E11.65 TYPE 2 DIABETES MELLITUS WITH HYPERGLYCEMIA, WITHOUT LONG-TERM CURRENT USE OF INSULIN (HCC): ICD-10-CM

## 2022-01-24 RX ORDER — METFORMIN HYDROCHLORIDE 500 MG/1
TABLET, EXTENDED RELEASE ORAL
Qty: 360 TABLET | Refills: 3 | Status: SHIPPED | OUTPATIENT
Start: 2022-01-24 | End: 2022-02-07 | Stop reason: SDUPTHER

## 2022-02-02 DIAGNOSIS — E11.65 TYPE 2 DIABETES MELLITUS WITH HYPERGLYCEMIA, WITHOUT LONG-TERM CURRENT USE OF INSULIN (HCC): ICD-10-CM

## 2022-02-02 RX ORDER — LISINOPRIL AND HYDROCHLOROTHIAZIDE 12.5; 2 MG/1; MG/1
TABLET ORAL
Qty: 90 TABLET | Refills: 3 | Status: SHIPPED | OUTPATIENT
Start: 2022-02-02 | End: 2022-04-25

## 2022-02-02 RX ORDER — INSULIN GLARGINE 100 [IU]/ML
24 INJECTION, SOLUTION SUBCUTANEOUS
Qty: 30 ML | Refills: 3 | Status: SHIPPED | OUTPATIENT
Start: 2022-02-02 | End: 2022-04-12 | Stop reason: SDUPTHER

## 2022-02-07 DIAGNOSIS — E11.65 TYPE 2 DIABETES MELLITUS WITH HYPERGLYCEMIA, WITHOUT LONG-TERM CURRENT USE OF INSULIN (HCC): ICD-10-CM

## 2022-02-07 DIAGNOSIS — E11.65 TYPE 2 DIABETES MELLITUS WITH HYPERGLYCEMIA, WITHOUT LONG-TERM CURRENT USE OF INSULIN (HCC): Primary | ICD-10-CM

## 2022-02-07 RX ORDER — METFORMIN HYDROCHLORIDE 500 MG/1
1000 TABLET, EXTENDED RELEASE ORAL 2 TIMES DAILY
Qty: 360 TABLET | Refills: 3 | Status: SHIPPED | OUTPATIENT
Start: 2022-02-07

## 2022-02-07 RX ORDER — INSULIN GLARGINE 100 [IU]/ML
INJECTION, SOLUTION SUBCUTANEOUS
Qty: 15 ML | Refills: 1 | Status: SHIPPED | OUTPATIENT
Start: 2022-02-07 | End: 2022-04-12 | Stop reason: ALTCHOICE

## 2022-02-07 NOTE — TELEPHONE ENCOUNTER
Pt called stating he needs a temporary prescription sent to [facility] as his Lantus will not ship out until next week.

## 2022-02-11 ENCOUNTER — HOSPITAL ENCOUNTER (OUTPATIENT)
Dept: GENERAL RADIOLOGY | Age: 82
Discharge: HOME | End: 2022-02-11
Payer: MEDICARE

## 2022-02-11 ENCOUNTER — TRANSCRIBE ORDER (OUTPATIENT)
Dept: REGISTRATION | Age: 82
End: 2022-02-11

## 2022-02-11 DIAGNOSIS — G89.29 CHRONIC PAIN: ICD-10-CM

## 2022-02-11 DIAGNOSIS — G89.29 CHRONIC PAIN: Primary | ICD-10-CM

## 2022-02-11 PROCEDURE — 71046 X-RAY EXAM CHEST 2 VIEWS: CPT

## 2022-02-21 ENCOUNTER — OFFICE VISIT (OUTPATIENT)
Dept: FAMILY MEDICINE CLINIC | Age: 82
End: 2022-02-21
Payer: MEDICARE

## 2022-02-21 VITALS
OXYGEN SATURATION: 98 % | HEIGHT: 67 IN | RESPIRATION RATE: 16 BRPM | HEART RATE: 68 BPM | DIASTOLIC BLOOD PRESSURE: 69 MMHG | BODY MASS INDEX: 29.53 KG/M2 | SYSTOLIC BLOOD PRESSURE: 122 MMHG | WEIGHT: 188.13 LBS | TEMPERATURE: 97.6 F

## 2022-02-21 DIAGNOSIS — Z01.818 PREOP EXAMINATION: Primary | ICD-10-CM

## 2022-02-21 PROCEDURE — G8432 DEP SCR NOT DOC, RNG: HCPCS | Performed by: STUDENT IN AN ORGANIZED HEALTH CARE EDUCATION/TRAINING PROGRAM

## 2022-02-21 PROCEDURE — G8419 CALC BMI OUT NRM PARAM NOF/U: HCPCS | Performed by: STUDENT IN AN ORGANIZED HEALTH CARE EDUCATION/TRAINING PROGRAM

## 2022-02-21 PROCEDURE — 1101F PT FALLS ASSESS-DOCD LE1/YR: CPT | Performed by: STUDENT IN AN ORGANIZED HEALTH CARE EDUCATION/TRAINING PROGRAM

## 2022-02-21 PROCEDURE — G8754 DIAS BP LESS 90: HCPCS | Performed by: STUDENT IN AN ORGANIZED HEALTH CARE EDUCATION/TRAINING PROGRAM

## 2022-02-21 PROCEDURE — G8752 SYS BP LESS 140: HCPCS | Performed by: STUDENT IN AN ORGANIZED HEALTH CARE EDUCATION/TRAINING PROGRAM

## 2022-02-21 PROCEDURE — G8536 NO DOC ELDER MAL SCRN: HCPCS | Performed by: STUDENT IN AN ORGANIZED HEALTH CARE EDUCATION/TRAINING PROGRAM

## 2022-02-21 PROCEDURE — G8427 DOCREV CUR MEDS BY ELIG CLIN: HCPCS | Performed by: STUDENT IN AN ORGANIZED HEALTH CARE EDUCATION/TRAINING PROGRAM

## 2022-02-21 PROCEDURE — 99214 OFFICE O/P EST MOD 30 MIN: CPT | Performed by: STUDENT IN AN ORGANIZED HEALTH CARE EDUCATION/TRAINING PROGRAM

## 2022-02-21 NOTE — PROGRESS NOTES
Chief Complaint   Patient presents with    Pre-op Exam     left knee   1. Have you been to the ER, urgent care clinic since your last visit? Hospitalized since your last visit? No    2. Have you seen or consulted any other health care providers outside of the [address] [place] [address] since your last visit? Include any pap smears or colon screening.  Yes Reason for visit: ortho,knee   Visit Vitals  /69 (BP 1 Location: Left upper arm, BP Patient Position: Sitting)   Pulse 68   Temp 97.6 °F (36.4 °C) (Temporal)   Resp 16   Ht 5' 7\" (1.702 m)   Wt 188 lb 2 oz (85.3 kg)   SpO2 98%   BMI 29.46 kg/m²

## 2022-02-21 NOTE — PROGRESS NOTES
Subjective:     Chief Complaint   Patient presents with    Pre-op Exam     left knee (patient has no form, patient has already had EKG/CXR)     HPI:  Jona Aguilar is a 78 y.o. male with past medical history of CKD, CAD, HLD, HTN, DM. He is scheduled for left knee surgery on 3/11/2022. Has already had lab and EKG done. Has already been cleared for surgery by cardiology. Denies any CP, SOB, palpitations. RCRI (revised cardiac risk index)  Class 3 risk=6.6% risk of MACE   [place] ] Cardiovascular disease   [ ] Stroke   [ ] Heart failure   [ X] DM requiring insulin    [ ] Cr > 2.0  Patient Active Problem List    Diagnosis    Osteoarthritis of left knee     Follows with orthopedic surgery      Polyneuropathy, unspecified    Hereditary and idiopathic neuropathy, unspecified    Vitamin D deficiency    Chronic kidney disease, stage 3 (HCC)     stage 3a mild to moderate loss of kidney function      Type 2 diabetes mellitus with hyperglycemia, without long-term current use of insulin ([address]    Essential hypertension    Mixed hyperlipidemia    Coronary arteriosclerosis     Past Medical History:   Diagnosis Date    Cataract     Chronic kidney disease, stage 3 ([address] 7/24/2019    stage 3a mild to moderate loss of kidney function    Coronary arteriosclerosis 6/29/2017    Coronary artery disease     Hyperlipidemia     Hypertension     Neuropathy     Type 2 diabetes mellitus ([address]     Vitamin D deficiency      Family History   Problem Relation Age of Onset    Cancer Mother         stomach    Heart Disease Father     Hypertension Father     Cancer Brother         brain tumor      reports that he quit smoking about 39 years ago. His smoking use included cigarettes. He has a 40.00 pack-year smoking history. He has never used smokeless tobacco. He reports that he does not drink alcohol and does not use drugs.   Current Outpatient Medications on File Prior to Visit   Medication Sig Dispense Refill  metFORMIN ER (GLUCOPHAGE XR) 500 mg tablet Take 2 Tablets by mouth two (2) times a day. 360 Tablet 3    insulin glargine (LANTUS,BASAGLAR) 100 unit/mL (3 mL) inpn 24 Units by SubCUTAneous route nightly 15 mL 1    insulin glargine (Lantus Solostar U-100 Insulin) 100 unit/mL (3 mL) inpn 24 Units by SubCUTAneous route nightly. 30 mL 3    lisinopril-hydroCHLOROthiazide (PRINZIDE, ZESTORETIC) 20-12.5 mg per tablet TAKE 1 TABLET BY MOUTH DAILY 90 Tablet 3    docosahexaenoic acid/epa (FISH OIL PO) Take  by mouth.  Accu-Chek Patricia Plus test strp strip USE TO TEST BLOOD SUGAR TWICE DAILY 100 Strip 5    ezetimibe (ZETIA) 10 mg tablet TAKE 1 TABLET BY MOUTH DAILY 90 Tablet 1    lancets misc USE TO TEST BLOOD SUGAR TWICE DAILY Dx Code: E11.65 200 Each 3    cyanocobalamin (VITAMIN B12) 500 mcg tablet Take 500 mcg by mouth daily.  DULoxetine (CYMBALTA) 20 mg capsule TAKE 1 CAPSULE BY MOUTH EVERY DAY 90 Cap 0    Insulin Needles, Disposable, (ALICE PEN NEEDLE) 32 gauge x 5/32\" ndle Use to inject Lantus nightly. DX Code: E11.65 100 Pen Needle 3    ergocalciferol (VITAMIN D2) 50,000 unit capsule Take 5,000 Units by mouth daily.  Blood-Glucose Meter monitoring kit Test BID Dx Code: E11.65 (Patient not taking: Reported on 10/7/2021) 1 Kit 0     No current facility-administered medications on file prior to visit. No Known Allergies  Review of Systems   All other systems reviewed and are negative. Objective:     Vitals:    02/21/22 1355   BP: 122/69   Pulse: 68   Resp: 16   Temp: 97.6 °F (36.4 °C)   TempSrc: Temporal   SpO2: 98%   Weight: 188 lb 2 oz (85.3 kg)   Height: 5' 7\" (1.702 m)     Physical Exam  Vitals reviewed. Constitutional:       Appearance: Normal appearance. HENT:      Head: Normocephalic and atraumatic. Cardiovascular:      Rate and Rhythm: Normal rate and regular rhythm. Heart sounds: Normal heart sounds.    Pulmonary:      Effort: Pulmonary effort is normal.      Breath sounds: Normal breath sounds. Neurological:      General: No focal deficit present. Mental Status: He is alert and oriented to person, place, and time. Psychiatric:         Behavior: Behavior normal.            Assessment/Plan:      Preop examination    Patient is a class III risk for surgery. Patient has been cleared for surgery by cardiologist already. Reviewed cardiology notes. Okay to proceed with scheduled left knee surgery. Advised to decrease insulin by half the night prior to his surgery. Other medications.     Follow-up  Already scheduled for appointment on 4/5/2022  Maura Valero MD

## 2022-03-03 DIAGNOSIS — M25.562 LEFT KNEE PAIN, UNSPECIFIED CHRONICITY: ICD-10-CM

## 2022-03-03 DIAGNOSIS — M17.12 PRIMARY OSTEOARTHRITIS OF LEFT KNEE: Primary | ICD-10-CM

## 2022-03-07 DIAGNOSIS — M25.562 LEFT KNEE PAIN, UNSPECIFIED CHRONICITY: ICD-10-CM

## 2022-03-07 DIAGNOSIS — M17.12 PRIMARY OSTEOARTHRITIS OF LEFT KNEE: Primary | ICD-10-CM

## 2022-03-08 ENCOUNTER — HOSPITAL ENCOUNTER (OUTPATIENT)
Dept: LAB | Age: 82
Discharge: HOME | End: 2022-03-08
Payer: MEDICARE

## 2022-03-08 ENCOUNTER — HOSPITAL ENCOUNTER (OUTPATIENT)
Dept: PREADMISSION TESTING | Age: 82
Discharge: HOME | End: 2022-03-08
Payer: MEDICARE

## 2022-03-08 ENCOUNTER — OFFICE VISIT (OUTPATIENT)
Dept: ORTHOPEDIC SURGERY | Age: 82
End: 2022-03-08
Payer: MEDICARE

## 2022-03-08 DIAGNOSIS — M25.562 LEFT KNEE PAIN, UNSPECIFIED CHRONICITY: Primary | ICD-10-CM

## 2022-03-08 DIAGNOSIS — M17.12 OSTEOARTHRITIS OF LEFT KNEE, UNSPECIFIED OSTEOARTHRITIS TYPE: ICD-10-CM

## 2022-03-08 LAB — SARS-COV-2, COV2: NORMAL

## 2022-03-08 PROCEDURE — 1101F PT FALLS ASSESS-DOCD LE1/YR: CPT | Performed by: ORTHOPAEDIC SURGERY

## 2022-03-08 PROCEDURE — G8419 CALC BMI OUT NRM PARAM NOF/U: HCPCS | Performed by: ORTHOPAEDIC SURGERY

## 2022-03-08 PROCEDURE — G8432 DEP SCR NOT DOC, RNG: HCPCS | Performed by: ORTHOPAEDIC SURGERY

## 2022-03-08 PROCEDURE — G8536 NO DOC ELDER MAL SCRN: HCPCS | Performed by: ORTHOPAEDIC SURGERY

## 2022-03-08 PROCEDURE — U0003 INFECTIOUS AGENT DETECTION BY NUCLEIC ACID (DNA OR RNA); SEVERE ACUTE RESPIRATORY SYNDROME CORONAVIRUS 2 (SARS-COV-2) (CORONAVIRUS DISEASE [COVID-19]), AMPLIFIED PROBE TECHNIQUE, MAKING USE OF HIGH THROUGHPUT TECHNOLOGIES AS DESCRIBED BY CMS-2020-01-R: HCPCS

## 2022-03-08 PROCEDURE — G8427 DOCREV CUR MEDS BY ELIG CLIN: HCPCS | Performed by: ORTHOPAEDIC SURGERY

## 2022-03-08 PROCEDURE — 99214 OFFICE O/P EST MOD 30 MIN: CPT | Performed by: ORTHOPAEDIC SURGERY

## 2022-03-08 PROCEDURE — G8756 NO BP MEASURE DOC: HCPCS | Performed by: ORTHOPAEDIC SURGERY

## 2022-03-08 RX ORDER — ONDANSETRON 4 MG/1
4 TABLET, ORALLY DISINTEGRATING ORAL
Qty: 20 TABLET | Refills: 0 | Status: SHIPPED | OUTPATIENT
Start: 2022-03-08

## 2022-03-08 RX ORDER — OXYCODONE AND ACETAMINOPHEN 5; 325 MG/1; MG/1
1 TABLET ORAL
Qty: 30 TABLET | Refills: 0 | Status: SHIPPED | OUTPATIENT
Start: 2022-03-08 | End: 2022-03-22

## 2022-03-08 RX ORDER — CEPHALEXIN 500 MG/1
500 CAPSULE ORAL EVERY 8 HOURS
Qty: 9 CAPSULE | Refills: 0 | Status: SHIPPED | OUTPATIENT
Start: 2022-03-08 | End: 2022-03-11

## 2022-03-08 RX ORDER — METOPROLOL SUCCINATE 100 MG/1
100 TABLET, EXTENDED RELEASE ORAL DAILY
COMMUNITY
Start: 2022-02-01

## 2022-03-08 NOTE — PROGRESS NOTES
Name: Jacquelene Boeck    : 1940     Service Dept: [address] [place] [address] Medicine    Chief Complaint   Patient presents with    Pre-op Exam    Knee Pain        There were no vitals taken for this visit. No Known Allergies     Current Outpatient Medications   Medication Sig Dispense Refill    metoprolol succinate (TOPROL-XL) 100 mg tablet       metFORMIN ER (GLUCOPHAGE XR) 500 mg tablet Take 2 Tablets by mouth two (2) times a day. 360 Tablet 3    insulin glargine (LANTUS,BASAGLAR) 100 unit/mL (3 mL) inpn 24 Units by SubCUTAneous route nightly 15 mL 1    insulin glargine (Lantus Solostar U-100 Insulin) 100 unit/mL (3 mL) inpn 24 Units by SubCUTAneous route nightly. 30 mL 3    lisinopril-hydroCHLOROthiazide (PRINZIDE, ZESTORETIC) 20-12.5 mg per tablet TAKE 1 TABLET BY MOUTH DAILY 90 Tablet 3    Accu-Chek Patricia Plus test strp strip USE TO TEST BLOOD SUGAR TWICE DAILY 100 Strip 5    ezetimibe (ZETIA) 10 mg tablet TAKE 1 TABLET BY MOUTH DAILY 90 Tablet 1    lancets misc USE TO TEST BLOOD SUGAR TWICE DAILY Dx Code: E11.65 200 Each 3    cyanocobalamin (VITAMIN B12) 500 mcg tablet Take 500 mcg by mouth daily.  DULoxetine (CYMBALTA) 20 mg capsule TAKE 1 CAPSULE BY MOUTH EVERY DAY 90 Cap 0    Insulin Needles, Disposable, (ALICE PEN NEEDLE) 32 gauge x 5/32\" ndle Use to inject Lantus nightly. DX Code: E11.65 100 Pen Needle 3    Blood-Glucose Meter monitoring kit Test BID Dx Code: E11.65 (Patient not taking: Reported on 10/7/2021) 1 Kit 0    ergocalciferol (VITAMIN D2) 50,000 unit capsule Take 5,000 Units by mouth daily.         Patient Active Problem List   Diagnosis Code    Type 2 diabetes mellitus with hyperglycemia, without long-term current use of insulin ([facility] [address] E11.65    Essential hypertension I10    Mixed hyperlipidemia E78.2    Hereditary and idiopathic neuropathy, 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    Vitamin D deficiency E55.9    Polyneuropathy, unspecified G62.9    Coronary arteriosclerosis I25.10    Chronic kidney disease, stage 3 (HCC) N18.30    Osteoarthritis of left knee M17.12      Family History   Problem Relation Age of Onset    Cancer Mother         stomach    Heart Disease Father     Hypertension Father     Cancer Brother         brain tumor      Social History     Socioeconomic History    Marital status:    Tobacco Use    Smoking status: Former Smoker     Packs/day: 2.00     Years: 20.00     Pack years: 40.00     Types: Cigarettes     Quit date: 26     Years since quittin.2    Smokeless tobacco: Never Used   Vaping Use    Vaping Use: Never used   Substance and Sexual Activity    Alcohol use: No    Drug use: No      Past Surgical History:   Procedure Laterality Date    HX CATARACT REMOVAL Bilateral     HX COLONOSCOPY        Past Medical History:   Diagnosis Date    Cataract     Chronic kidney disease, stage 3 ([facility] 2019    stage 3a mild to moderate loss of kidney function    Coronary arteriosclerosis 2017    Coronary artery disease     Hyperlipidemia     Hypertension     Neuropathy     Type 2 diabetes mellitus ([facility]     Vitamin D deficiency         I have reviewed and agree with [address] [place] [address] Nw and ROS and intake form in chart and the record furthermore I have reviewed prior medical record(s) regarding this patients care during this appointment. Review of Systems:   Patient is a pleasant appearing individual, appropriately dressed, well hydrated, well nourished, who is alert, appropriately oriented for age, and in no acute distress with a normal gait and normal affect who does not appear to be in any significant pain.    Physical Exam:  Left Knee -Decrease range of motion with flexion, Knee arc of greater than 50 degrees, Some crepitation, Grossly neurovascularly intact, Good cap refill, No skin lesion, Moderate swelling, No gross instability, Some quadriceps weakness, Kellgren and Rob at least grade 3    Right Knee - Full Range of Motion, No crepitation, Grossly neurovascularly intact, Good cap refill, No skin lesion, No swelling, No gross instability, No quadriceps weakness     Inpatient status: The patient has admitted to severe pain in the affected knee and due to such pain they are unable to complete activities of daily living at home and/or work on a regular basis where conservative treatments have failed. After extensive discussion with the patient, they have chosen to receive a total knee replacement with the expectation of inpatient procedure. Their dependent functional status (i.e. lack of capable support and safety at home, pain management, comorbities, or difficulty ambulating with assistive walking devices) would deem them a candidate for an inpatient stay. The patient acknowledges and understand the plan. The risks of surgery were explained to the patient which include but not limited to infection, nerve injury, artery injury, tendon injury, poor result, poor wound healing, unforeseen incidence, bleeding, infection, nerve damage, failure to improve, worsening of symptoms, morbidity, and mortality risks were explained. All questions were answered. Patient was told of no guarantees. Patient accepts all risks and benefits. A consent for surgery will be documented and signed by the patient or a legal guardian. All questions were answered. The procedure was explained in detail. The patient was counseled about the risks of will Covid-19 during their perioperative period and any recovery window from their procedure. The patient was made aware that will Covid-19 may worsen their prognosis for recovering from their procedure and lend to a higher morbidity and/or mortality risk. All material risks, benefits, and reasonable alternatives including postponing the procedure were discussed. The patient DOES wish to proceed with their procedure at this time. Encounter Diagnoses     ICD-10-CM ICD-9-CM   1.  Left knee pain, unspecified chronicity  M25.562 719.46   2. Osteoarthritis of left knee, unspecified osteoarthritis type  M17.12 715.96       HPI:  The patient is here with a chief complaint of left knee pain, dull, throbbing pain. Preop appointment. Pain is 3/10. X-rays are positive for severe OA. He is scheduled for left knee replacement. Assessment/Plan:  Plan will be for Percocet, Keflex, Zofran, aspirin for DVT prophylaxis. We will keep him inpatient just because of his significant history and his age and go from there. Addendum:  He does not have any history of blood clots. Does not take any thinners and no surgical complications in the past.        As part of continued conservative pain management options the patient was advised to utilize Tylenol or OTC NSAIDS as long as it is not medically contraindicated. Return to Office: Follow-up and Dispositions    · Return for already scheduled for surgery. Scribed by Keisha Grijalva LPN as dictated by [facility] [place] SIMEON Mackenzie MD.  Documentation True and Accepted Gavino SIMEON Mackenzie MD

## 2022-03-08 NOTE — LETTER
3/10/2022    Patient: David Jean   YOB: 1940   Date of Visit: 3/8/2022     Cora Spear MD  [address] [address]  [facility] [place] [address] [facility]    Dear Cora Spear MD,      Thank you for referring Mr. Mccallum to [address] [place] [address] for evaluation. My notes for this consultation are attached. If you have questions, please do not hesitate to call me. I look forward to following your patient along with you.       Sincerely,    Dru Treviño MD

## 2022-03-08 NOTE — PATIENT INSTRUCTIONS
Knee Arthritis: Care Instructions  Your Care Instructions     Knee arthritis is a breakdown of the cartilage that cushions your knee joint. When the cartilage wears down, your bones rub against each other. This causes pain and stiffness. Knee arthritis tends to get worse with time. Treatment for knee arthritis involves reducing pain, making the leg muscles stronger, and staying at a healthy body weight. The treatment usually does not improve the health of the cartilage, but it can reduce pain and improve how well your knee works. You can take simple measures to protect your knee joints, ease your pain, and help you stay active. Follow-up care is a key part of your treatment and safety. Be sure to make and go to all appointments, and call your doctor if you are having problems. It's also a good idea to know your test results and keep a list of the medicines you take. How can you care for yourself at home? · Know that knee arthritis will cause more pain on some days than on others. · Stay at a healthy weight. Lose weight if you are overweight. When you stand up, the pressure on your knees from every pound of body weight is multiplied four times. So if you lose 10 pounds, you will reduce the pressure on your knees by 40 pounds. · Talk to your doctor or physical therapist about exercises that will help ease joint pain. ? Stretch to help prevent stiffness and to prevent injury before you exercise. You may enjoy gentle forms of yoga to help keep your knee joints and muscles flexible. ? Walk instead of jog.  ? Ride a bike. This makes your thigh muscles stronger and takes pressure off your knee. ? Wear well-fitting and comfortable shoes. ? Exercise in chest-deep water. This can help you exercise longer with less pain. ? Avoid exercises that include squatting or kneeling. They can put a lot of strain on your knees.   ? Talk to your doctor to make sure that the exercise you do is not making the arthritis worse.  · Do not sit for long periods of time. Try to walk once in a while to keep your knee from getting stiff. · Ask your doctor or physical therapist whether shoe inserts may reduce your arthritis pain. · If you can afford it, get new athletic shoes at least every year. This can help reduce the strain on your knees. · Use a device to help you do everyday activities. ? A cane or walking stick can help you keep your balance when you walk. Hold the cane or walking stick in the hand opposite the painful knee. ? If you feel like you may fall when you walk, try using crutches or a front-wheeled walker. These can prevent falls that could cause more damage to your knee. ? A knee brace may help keep your knee stable and prevent pain. ? You also can use other things to make life easier, such as a higher toilet seat and handrails in the bathtub or shower. · Take pain medicines exactly as directed. ? Do not wait until you are in severe pain. You will get better results if you take it sooner. ? If you are not taking a prescription pain medicine, take an over-the-counter medicine such as acetaminophen (Tylenol), ibuprofen (Advil, Motrin), or naproxen (Aleve). Read and follow all instructions on the label. ? Do not take two or more pain medicines at the same time unless the doctor told you to. Many pain medicines have acetaminophen, which is Tylenol. Too much acetaminophen (Tylenol) can be harmful. ? Tell your doctor if you take a blood thinner, have diabetes, or have allergies to shellfish. · Ask your doctor if you might benefit from a shot of steroid medicine into your knee. This may provide pain relief for several months. · Many people take the supplements glucosamine and chondroitin for osteoarthritis. Some people feel they help, but the medical research does not show that they work. Talk to your doctor before you take these supplements. When should you call for help?    Call your doctor now or seek immediate medical care if:    · You have sudden swelling, warmth, or pain in your knee.     · You have knee pain and a fever or rash.     · You have such bad pain that you cannot use your knee. Watch closely for changes in your health, and be sure to contact your doctor if you have any problems. Where can you learn more? Go to http://www.gray.com/  Enter W187 in the search box to learn more about \"Knee Arthritis: Care Instructions. \"  Current as of: December 20, 2021               Content Version: 13.2  © 3015-6563 [employer]. Care instructions adapted under license by [employer] (which disclaims liability or warranty for this information). If you have questions about a medical condition or this instruction, always ask your healthcare professional. [address] any warranty or liability for your use of this information.

## 2022-03-08 NOTE — H&P (VIEW-ONLY)
Name: Melina St    : 1940     Service Dept: [address] [place] [address]    Chief Complaint   Patient presents with    Pre-op Exam    Knee Pain        There were no vitals taken for this visit. No Known Allergies     Current Outpatient Medications   Medication Sig Dispense Refill    metoprolol succinate (TOPROL-XL) 100 mg tablet       metFORMIN ER (GLUCOPHAGE XR) 500 mg tablet Take 2 Tablets by mouth two (2) times a day. 360 Tablet 3    insulin glargine (LANTUS,BASAGLAR) 100 unit/mL (3 mL) inpn 24 Units by SubCUTAneous route nightly 15 mL 1    insulin glargine (Lantus Solostar U-100 Insulin) 100 unit/mL (3 mL) inpn 24 Units by SubCUTAneous route nightly. 30 mL 3    lisinopril-hydroCHLOROthiazide (PRINZIDE, ZESTORETIC) 20-12.5 mg per tablet TAKE 1 TABLET BY MOUTH DAILY 90 Tablet 3    Accu-Chek Patricia Plus test strp strip USE TO TEST BLOOD SUGAR TWICE DAILY 100 Strip 5    ezetimibe (ZETIA) 10 mg tablet TAKE 1 TABLET BY MOUTH DAILY 90 Tablet 1    lancets misc USE TO TEST BLOOD SUGAR TWICE DAILY Dx Code: E11.65 200 Each 3    cyanocobalamin (VITAMIN B12) 500 mcg tablet Take 500 mcg by mouth daily.  DULoxetine (CYMBALTA) 20 mg capsule TAKE 1 CAPSULE BY MOUTH EVERY DAY 90 Cap 0    Insulin Needles, Disposable, (ALICE PEN NEEDLE) 32 gauge x 5/32\" ndle Use to inject Lantus nightly. DX Code: E11.65 100 Pen Needle 3    Blood-Glucose Meter monitoring kit Test BID Dx Code: E11.65 (Patient not taking: Reported on 10/7/2021) 1 Kit 0    ergocalciferol (VITAMIN D2) 50,000 unit capsule Take 5,000 Units by mouth daily.         Patient Active Problem List   Diagnosis Code    Type 2 diabetes mellitus with hyperglycemia, without long-term current use of insulin ([facility] E11.65    Essential hypertension I10    Mixed hyperlipidemia E78.2    Hereditary and idiopathic neuropathy, 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    Vitamin D deficiency E55.9    Polyneuropathy, unspecified G62.9    Coronary arteriosclerosis I25.10    Chronic kidney disease, stage 3 (HCC) N18.30    Osteoarthritis of left knee M17.12      Family History   Problem Relation Age of Onset    Cancer Mother         stomach    Heart Disease Father     Hypertension Father     Cancer Brother         brain tumor      Social History     Socioeconomic History    Marital status:    Tobacco Use    Smoking status: Former Smoker     Packs/day: 2.00     Years: 20.00     Pack years: 40.00     Types: Cigarettes     Quit date: 26     Years since quittin.2    Smokeless tobacco: Never Used   Vaping Use    Vaping Use: Never used   Substance and Sexual Activity    Alcohol use: No    Drug use: No      Past Surgical History:   Procedure Laterality Date    HX CATARACT REMOVAL Bilateral     HX COLONOSCOPY        Past Medical History:   Diagnosis Date    Cataract     Chronic kidney disease, stage 3 ([facility] 2019    stage 3a mild to moderate loss of kidney function    Coronary arteriosclerosis 2017    Coronary artery disease     Hyperlipidemia     Hypertension     Neuropathy     Type 2 diabetes mellitus ([facility]     Vitamin D deficiency         I have reviewed and agree with [address] [place] [address] Nw and ROS and intake form in chart and the record furthermore I have reviewed prior medical record(s) regarding this patients care during this appointment. Review of Systems:   Patient is a pleasant appearing individual, appropriately dressed, well hydrated, well nourished, who is alert, appropriately oriented for age, and in no acute distress with a normal gait and normal affect who does not appear to be in any significant pain.    Physical Exam:  Left Knee -Decrease range of motion with flexion, Knee arc of greater than 50 degrees, Some crepitation, Grossly neurovascularly intact, Good cap refill, No skin lesion, Moderate swelling, No gross instability, Some quadriceps weakness, Kellgren and Rob at least grade 3    Right Knee - Full Range of Motion, No crepitation, Grossly neurovascularly intact, Good cap refill, No skin lesion, No swelling, No gross instability, No quadriceps weakness     Inpatient status: The patient has admitted to severe pain in the affected knee and due to such pain they are unable to complete activities of daily living at home and/or work on a regular basis where conservative treatments have failed. After extensive discussion with the patient, they have chosen to receive a total knee replacement with the expectation of inpatient procedure. Their dependent functional status (i.e. lack of capable support and safety at home, pain management, comorbities, or difficulty ambulating with assistive walking devices) would deem them a candidate for an inpatient stay. The patient acknowledges and understand the plan. The risks of surgery were explained to the patient which include but not limited to infection, nerve injury, artery injury, tendon injury, poor result, poor wound healing, unforeseen incidence, bleeding, infection, nerve damage, failure to improve, worsening of symptoms, morbidity, and mortality risks were explained. All questions were answered. Patient was told of no guarantees. Patient accepts all risks and benefits. A consent for surgery will be documented and signed by the patient or a legal guardian. All questions were answered. The procedure was explained in detail. The patient was counseled about the risks of will Covid-19 during their perioperative period and any recovery window from their procedure. The patient was made aware that will Covid-19 may worsen their prognosis for recovering from their procedure and lend to a higher morbidity and/or mortality risk. All material risks, benefits, and reasonable alternatives including postponing the procedure were discussed. The patient DOES wish to proceed with their procedure at this time. Encounter Diagnoses     ICD-10-CM ICD-9-CM   1.  Left knee pain, unspecified chronicity  M25.562 719.46   2. Osteoarthritis of left knee, unspecified osteoarthritis type  M17.12 715.96       HPI:  The patient is here with a chief complaint of left knee pain, dull, throbbing pain. Preop appointment. Pain is 3/10. X-rays are positive for severe OA. He is scheduled for left knee replacement. Assessment/Plan:  Plan will be for Percocet, Keflex, Zofran, aspirin for DVT prophylaxis. We will keep him inpatient just because of his significant history and his age and go from there. Addendum:  He does not have any history of blood clots. Does not take any thinners and no surgical complications in the past.        As part of continued conservative pain management options the patient was advised to utilize Tylenol or OTC NSAIDS as long as it is not medically contraindicated. Return to Office: Follow-up and Dispositions    · Return for already scheduled for surgery. Scribed by Rosalba Starkey LPN as dictated by [facility] [place] SIMEON Curtis MD.  Documentation True and Accepted Gavino SIMEON Curtis MD

## 2022-03-09 LAB — SARS-COV-2, NAA: NOT DETECTED

## 2022-03-14 ENCOUNTER — APPOINTMENT (OUTPATIENT)
Dept: PHYSICAL THERAPY | Age: 82
End: 2022-03-14

## 2022-03-17 ENCOUNTER — APPOINTMENT (OUTPATIENT)
Dept: GENERAL RADIOLOGY | Age: 82
End: 2022-03-17
Attending: NURSE PRACTITIONER
Payer: MEDICARE

## 2022-03-17 ENCOUNTER — ANESTHESIA (OUTPATIENT)
Dept: SURGERY | Age: 82
End: 2022-03-17
Payer: MEDICARE

## 2022-03-17 ENCOUNTER — ANESTHESIA EVENT (OUTPATIENT)
Dept: SURGERY | Age: 82
End: 2022-03-17
Payer: MEDICARE

## 2022-03-17 ENCOUNTER — HOSPITAL ENCOUNTER (OUTPATIENT)
Age: 82
Setting detail: OBSERVATION
Discharge: HOME | End: 2022-03-18
Attending: ORTHOPAEDIC SURGERY | Admitting: INTERNAL MEDICINE
Payer: MEDICARE

## 2022-03-17 PROBLEM — Z96.652 S/P TOTAL KNEE REPLACEMENT, LEFT: Status: ACTIVE | Noted: 2022-03-17

## 2022-03-17 PROBLEM — M17.9 OA (OSTEOARTHRITIS) OF KNEE: Status: ACTIVE | Noted: 2022-03-17

## 2022-03-17 LAB
COVID-19 RAPID TEST, COVR: NOT DETECTED
GLUCOSE BLD STRIP.AUTO-MCNC: 120 MG/DL (ref 70–110)
GLUCOSE BLD STRIP.AUTO-MCNC: 133 MG/DL (ref 70–110)
GLUCOSE BLD STRIP.AUTO-MCNC: 178 MG/DL (ref 70–110)
PERFORMED BY, TECHID: ABNORMAL
SARS-COV-2, COV2: NORMAL
SARS-COV-2, COV2: NORMAL

## 2022-03-17 PROCEDURE — C1776 JOINT DEVICE (IMPLANTABLE): HCPCS | Performed by: ORTHOPAEDIC SURGERY

## 2022-03-17 PROCEDURE — 77030000032 HC CUF TRNQT ZIMM -B: Performed by: ORTHOPAEDIC SURGERY

## 2022-03-17 PROCEDURE — 74011636637 HC RX REV CODE- 636/637: Performed by: NURSE PRACTITIONER

## 2022-03-17 PROCEDURE — 64450 NJX AA&/STRD OTHER PN/BRANCH: CPT | Performed by: NURSE ANESTHETIST, CERTIFIED REGISTERED

## 2022-03-17 PROCEDURE — C1713 ANCHOR/SCREW BN/BN,TIS/BN: HCPCS | Performed by: ORTHOPAEDIC SURGERY

## 2022-03-17 PROCEDURE — 77030007866 HC KT SPN ANES BBMI -B: Performed by: NURSE ANESTHETIST, CERTIFIED REGISTERED

## 2022-03-17 PROCEDURE — 87635 SARS-COV-2 COVID-19 AMP PRB: CPT

## 2022-03-17 PROCEDURE — 77030039147 HC PWDR HEMSTS SURGICEL JNJ -D: Performed by: ORTHOPAEDIC SURGERY

## 2022-03-17 PROCEDURE — 76942 ECHO GUIDE FOR BIOPSY: CPT | Performed by: NURSE ANESTHETIST, CERTIFIED REGISTERED

## 2022-03-17 PROCEDURE — 74011250636 HC RX REV CODE- 250/636: Performed by: NURSE PRACTITIONER

## 2022-03-17 PROCEDURE — 2709999900 HC NON-CHARGEABLE SUPPLY: Performed by: ORTHOPAEDIC SURGERY

## 2022-03-17 PROCEDURE — 77030006835 HC BLD SAW SAG STRY -B: Performed by: ORTHOPAEDIC SURGERY

## 2022-03-17 PROCEDURE — 77030031139 HC SUT VCRL2 J&J -A: Performed by: ORTHOPAEDIC SURGERY

## 2022-03-17 PROCEDURE — 77030040361 HC SLV COMPR DVT MDII -B: Performed by: ORTHOPAEDIC SURGERY

## 2022-03-17 PROCEDURE — 74011250637 HC RX REV CODE- 250/637: Performed by: NURSE PRACTITIONER

## 2022-03-17 PROCEDURE — 77030038692 HC WND DEB SYS IRMX -B: Performed by: ORTHOPAEDIC SURGERY

## 2022-03-17 PROCEDURE — 77030003601 HC NDL NRV BLK BBMI -A: Performed by: NURSE ANESTHETIST, CERTIFIED REGISTERED

## 2022-03-17 PROCEDURE — 74011000250 HC RX REV CODE- 250: Performed by: ORTHOPAEDIC SURGERY

## 2022-03-17 PROCEDURE — 77030018673: Performed by: ORTHOPAEDIC SURGERY

## 2022-03-17 PROCEDURE — 73560 X-RAY EXAM OF KNEE 1 OR 2: CPT

## 2022-03-17 PROCEDURE — 76210000063 HC OR PH I REC FIRST 0.5 HR: Performed by: ORTHOPAEDIC SURGERY

## 2022-03-17 PROCEDURE — 77030013708 HC HNDPC SUC IRR PULS STRY –B: Performed by: ORTHOPAEDIC SURGERY

## 2022-03-17 PROCEDURE — G0378 HOSPITAL OBSERVATION PER HR: HCPCS

## 2022-03-17 PROCEDURE — 74011000258 HC RX REV CODE- 258: Performed by: NURSE PRACTITIONER

## 2022-03-17 PROCEDURE — 76060000034 HC ANESTHESIA 1.5 TO 2 HR: Performed by: ORTHOPAEDIC SURGERY

## 2022-03-17 PROCEDURE — 77030040393 HC DRSG OPTIFOAM GENT MDII -B: Performed by: ORTHOPAEDIC SURGERY

## 2022-03-17 PROCEDURE — 74011250636 HC RX REV CODE- 250/636: Performed by: NURSE ANESTHETIST, CERTIFIED REGISTERED

## 2022-03-17 PROCEDURE — U0003 INFECTIOUS AGENT DETECTION BY NUCLEIC ACID (DNA OR RNA); SEVERE ACUTE RESPIRATORY SYNDROME CORONAVIRUS 2 (SARS-COV-2) (CORONAVIRUS DISEASE [COVID-19]), AMPLIFIED PROBE TECHNIQUE, MAKING USE OF HIGH THROUGHPUT TECHNOLOGIES AS DESCRIBED BY CMS-2020-01-R: HCPCS

## 2022-03-17 PROCEDURE — 74011000250 HC RX REV CODE- 250: Performed by: NURSE PRACTITIONER

## 2022-03-17 PROCEDURE — 77030013079 HC BLNKT BAIR HGGR 3M -A: Performed by: NURSE ANESTHETIST, CERTIFIED REGISTERED

## 2022-03-17 PROCEDURE — 76010000153 HC OR TIME 1.5 TO 2 HR: Performed by: ORTHOPAEDIC SURGERY

## 2022-03-17 PROCEDURE — 77030011628: Performed by: ORTHOPAEDIC SURGERY

## 2022-03-17 PROCEDURE — 77030031140 HC SUT VCRL3 J&J -A: Performed by: ORTHOPAEDIC SURGERY

## 2022-03-17 PROCEDURE — 74011000272 HC RX REV CODE- 272: Performed by: ORTHOPAEDIC SURGERY

## 2022-03-17 PROCEDURE — 74011000258 HC RX REV CODE- 258: Performed by: NURSE ANESTHETIST, CERTIFIED REGISTERED

## 2022-03-17 PROCEDURE — 77030006812 HC BLD SAW RECIP STRY -B: Performed by: ORTHOPAEDIC SURGERY

## 2022-03-17 PROCEDURE — 77030029372 HC ADH SKN CLSR PRINEO J&J -C: Performed by: ORTHOPAEDIC SURGERY

## 2022-03-17 PROCEDURE — 82962 GLUCOSE BLOOD TEST: CPT

## 2022-03-17 PROCEDURE — 74011000250 HC RX REV CODE- 250: Performed by: NURSE ANESTHETIST, CERTIFIED REGISTERED

## 2022-03-17 PROCEDURE — 77030011266 HC ELECTRD BLD INSL COVD -A: Performed by: ORTHOPAEDIC SURGERY

## 2022-03-17 DEVICE — ATTUNE KNEE SYSTEM TIBIAL BASE ROTATING PLATFORM SIZE 7 CEMENTED
Type: IMPLANTABLE DEVICE | Site: KNEE | Status: FUNCTIONAL
Brand: ATTUNE

## 2022-03-17 DEVICE — ATTUNE KNEE SYSTEM FEMORAL POSTERIOR STABILIZED SIZE 6 LEFT CEMENTED
Type: IMPLANTABLE DEVICE | Site: KNEE | Status: FUNCTIONAL
Brand: ATTUNE

## 2022-03-17 DEVICE — DEPUY CMW 1 GENTAMICIN BONE CEMENT 40G: Type: IMPLANTABLE DEVICE | Site: KNEE | Status: FUNCTIONAL

## 2022-03-17 DEVICE — KNEE K1 TOT HEMI STD CEM IMPL CAPPED SYNTHES: Type: IMPLANTABLE DEVICE | Site: KNEE | Status: FUNCTIONAL

## 2022-03-17 DEVICE — ATTUNE KNEE SYSTEM TIBIAL INSERT ROTATING PLATFORM POSTERIOR STABILIZED 6 7MM AOX
Type: IMPLANTABLE DEVICE | Site: KNEE | Status: FUNCTIONAL
Brand: ATTUNE

## 2022-03-17 DEVICE — ATTUNE PATELLA MEDIALIZED DOME 38MM CEMENTED AOX
Type: IMPLANTABLE DEVICE | Site: KNEE | Status: FUNCTIONAL
Brand: ATTUNE

## 2022-03-17 RX ORDER — DULOXETIN HYDROCHLORIDE 20 MG/1
20 CAPSULE, DELAYED RELEASE ORAL DAILY
Status: DISCONTINUED | OUTPATIENT
Start: 2022-03-18 | End: 2022-03-18 | Stop reason: HOSPADM

## 2022-03-17 RX ORDER — ACETAMINOPHEN 325 MG/1
650 TABLET ORAL
Status: DISCONTINUED | OUTPATIENT
Start: 2022-03-17 | End: 2022-03-18 | Stop reason: HOSPADM

## 2022-03-17 RX ORDER — FENTANYL CITRATE 50 UG/ML
50 INJECTION, SOLUTION INTRAMUSCULAR; INTRAVENOUS AS NEEDED
Status: DISCONTINUED | OUTPATIENT
Start: 2022-03-17 | End: 2022-03-17 | Stop reason: HOSPADM

## 2022-03-17 RX ORDER — MAGNESIUM SULFATE 100 %
4 CRYSTALS MISCELLANEOUS AS NEEDED
Status: DISCONTINUED | OUTPATIENT
Start: 2022-03-17 | End: 2022-03-17 | Stop reason: HOSPADM

## 2022-03-17 RX ORDER — BUPIVACAINE HYDROCHLORIDE 2.5 MG/ML
INJECTION, SOLUTION EPIDURAL; INFILTRATION; INTRACAUDAL AS NEEDED
Status: DISCONTINUED | OUTPATIENT
Start: 2022-03-17 | End: 2022-03-17 | Stop reason: HOSPADM

## 2022-03-17 RX ORDER — SODIUM CHLORIDE, SODIUM LACTATE, POTASSIUM CHLORIDE, CALCIUM CHLORIDE 600; 310; 30; 20 MG/100ML; MG/100ML; MG/100ML; MG/100ML
25 INJECTION, SOLUTION INTRAVENOUS CONTINUOUS
Status: DISCONTINUED | OUTPATIENT
Start: 2022-03-17 | End: 2022-03-17 | Stop reason: HOSPADM

## 2022-03-17 RX ORDER — SODIUM CHLORIDE 0.9 % (FLUSH) 0.9 %
5-40 SYRINGE (ML) INJECTION AS NEEDED
Status: DISCONTINUED | OUTPATIENT
Start: 2022-03-17 | End: 2022-03-17 | Stop reason: HOSPADM

## 2022-03-17 RX ORDER — INSULIN LISPRO 100 [IU]/ML
INJECTION, SOLUTION INTRAVENOUS; SUBCUTANEOUS
Status: DISCONTINUED | OUTPATIENT
Start: 2022-03-17 | End: 2022-03-18 | Stop reason: HOSPADM

## 2022-03-17 RX ORDER — CEFAZOLIN SODIUM IN 0.9 % NACL 2 G/100 ML
2 PLASTIC BAG, INJECTION (ML) INTRAVENOUS ONCE
Status: COMPLETED | OUTPATIENT
Start: 2022-03-17 | End: 2022-03-17

## 2022-03-17 RX ORDER — NALOXONE HYDROCHLORIDE 0.4 MG/ML
0.4 INJECTION, SOLUTION INTRAMUSCULAR; INTRAVENOUS; SUBCUTANEOUS AS NEEDED
Status: DISCONTINUED | OUTPATIENT
Start: 2022-03-17 | End: 2022-03-18 | Stop reason: HOSPADM

## 2022-03-17 RX ORDER — EZETIMIBE 10 MG/1
10 TABLET ORAL DAILY
Status: DISCONTINUED | OUTPATIENT
Start: 2022-03-18 | End: 2022-03-18 | Stop reason: HOSPADM

## 2022-03-17 RX ORDER — BUPIVACAINE HYDROCHLORIDE 5 MG/ML
INJECTION, SOLUTION EPIDURAL; INTRACAUDAL
Status: SHIPPED | OUTPATIENT
Start: 2022-03-17 | End: 2022-03-17

## 2022-03-17 RX ORDER — SODIUM CHLORIDE 0.9 % (FLUSH) 0.9 %
5-40 SYRINGE (ML) INJECTION EVERY 8 HOURS
Status: DISCONTINUED | OUTPATIENT
Start: 2022-03-17 | End: 2022-03-18 | Stop reason: HOSPADM

## 2022-03-17 RX ORDER — DEXTROSE 50 % IN WATER (D50W) INTRAVENOUS SYRINGE
25-50 AS NEEDED
Status: DISCONTINUED | OUTPATIENT
Start: 2022-03-17 | End: 2022-03-17

## 2022-03-17 RX ORDER — OXYCODONE AND ACETAMINOPHEN 5; 325 MG/1; MG/1
2 TABLET ORAL
Status: DISCONTINUED | OUTPATIENT
Start: 2022-03-17 | End: 2022-03-18 | Stop reason: HOSPADM

## 2022-03-17 RX ORDER — CELECOXIB 200 MG/1
400 CAPSULE ORAL
Status: CANCELLED | OUTPATIENT
Start: 2022-03-17 | End: 2022-03-17

## 2022-03-17 RX ORDER — FACIAL-BODY WIPES
10 EACH TOPICAL DAILY PRN
Status: DISCONTINUED | OUTPATIENT
Start: 2022-03-17 | End: 2022-03-18 | Stop reason: HOSPADM

## 2022-03-17 RX ORDER — ACETAMINOPHEN 500 MG
1000 TABLET ORAL ONCE
Status: DISCONTINUED | OUTPATIENT
Start: 2022-03-17 | End: 2022-03-17 | Stop reason: HOSPADM

## 2022-03-17 RX ORDER — DEXAMETHASONE SODIUM PHOSPHATE 4 MG/ML
INJECTION, SOLUTION INTRA-ARTICULAR; INTRALESIONAL; INTRAMUSCULAR; INTRAVENOUS; SOFT TISSUE
Status: SHIPPED | OUTPATIENT
Start: 2022-03-17 | End: 2022-03-17

## 2022-03-17 RX ORDER — DIPHENHYDRAMINE HYDROCHLORIDE 50 MG/ML
12.5 INJECTION, SOLUTION INTRAMUSCULAR; INTRAVENOUS
Status: DISCONTINUED | OUTPATIENT
Start: 2022-03-17 | End: 2022-03-17 | Stop reason: HOSPADM

## 2022-03-17 RX ORDER — PROPOFOL 10 MG/ML
INJECTION, EMULSION INTRAVENOUS
Status: DISCONTINUED | OUTPATIENT
Start: 2022-03-17 | End: 2022-03-17 | Stop reason: HOSPADM

## 2022-03-17 RX ORDER — SODIUM CHLORIDE 0.9 % (FLUSH) 0.9 %
5-40 SYRINGE (ML) INJECTION AS NEEDED
Status: DISCONTINUED | OUTPATIENT
Start: 2022-03-17 | End: 2022-03-18 | Stop reason: HOSPADM

## 2022-03-17 RX ORDER — GABAPENTIN 300 MG/1
300 CAPSULE ORAL ONCE
Status: DISCONTINUED | OUTPATIENT
Start: 2022-03-17 | End: 2022-03-17 | Stop reason: HOSPADM

## 2022-03-17 RX ORDER — ASPIRIN 325 MG
325 TABLET, DELAYED RELEASE (ENTERIC COATED) ORAL 2 TIMES DAILY
Status: DISCONTINUED | OUTPATIENT
Start: 2022-03-18 | End: 2022-03-18 | Stop reason: HOSPADM

## 2022-03-17 RX ORDER — CEFAZOLIN SODIUM IN 0.9 % NACL 2 G/100 ML
2 PLASTIC BAG, INJECTION (ML) INTRAVENOUS EVERY 8 HOURS
Status: DISCONTINUED | OUTPATIENT
Start: 2022-03-17 | End: 2022-03-17

## 2022-03-17 RX ORDER — KETOROLAC TROMETHAMINE 30 MG/ML
15 INJECTION, SOLUTION INTRAMUSCULAR; INTRAVENOUS
Status: DISCONTINUED | OUTPATIENT
Start: 2022-03-17 | End: 2022-03-18 | Stop reason: HOSPADM

## 2022-03-17 RX ORDER — ONDANSETRON 2 MG/ML
4 INJECTION INTRAMUSCULAR; INTRAVENOUS ONCE
Status: DISCONTINUED | OUTPATIENT
Start: 2022-03-17 | End: 2022-03-17 | Stop reason: HOSPADM

## 2022-03-17 RX ORDER — OXYCODONE AND ACETAMINOPHEN 10; 325 MG/1; MG/1
1 TABLET ORAL
Status: DISCONTINUED | OUTPATIENT
Start: 2022-03-17 | End: 2022-03-18 | Stop reason: HOSPADM

## 2022-03-17 RX ORDER — MIDAZOLAM HYDROCHLORIDE 1 MG/ML
INJECTION, SOLUTION INTRAMUSCULAR; INTRAVENOUS
Status: SHIPPED | OUTPATIENT
Start: 2022-03-17 | End: 2022-03-17

## 2022-03-17 RX ORDER — DEXTROSE 50 % IN WATER (D50W) INTRAVENOUS SYRINGE
25-50 AS NEEDED
Status: DISCONTINUED | OUTPATIENT
Start: 2022-03-17 | End: 2022-03-17 | Stop reason: HOSPADM

## 2022-03-17 RX ORDER — SENNOSIDES 8.6 MG/1
1 TABLET ORAL 2 TIMES DAILY
Status: DISCONTINUED | OUTPATIENT
Start: 2022-03-17 | End: 2022-03-18 | Stop reason: HOSPADM

## 2022-03-17 RX ORDER — INSULIN GLARGINE 100 [IU]/ML
24 INJECTION, SOLUTION SUBCUTANEOUS
Status: DISCONTINUED | OUTPATIENT
Start: 2022-03-17 | End: 2022-03-18 | Stop reason: HOSPADM

## 2022-03-17 RX ORDER — FLUMAZENIL 0.1 MG/ML
0.2 INJECTION INTRAVENOUS
Status: DISCONTINUED | OUTPATIENT
Start: 2022-03-17 | End: 2022-03-17 | Stop reason: HOSPADM

## 2022-03-17 RX ORDER — DIPHENHYDRAMINE HYDROCHLORIDE 50 MG/ML
12.5 INJECTION, SOLUTION INTRAMUSCULAR; INTRAVENOUS
Status: DISCONTINUED | OUTPATIENT
Start: 2022-03-17 | End: 2022-03-18 | Stop reason: HOSPADM

## 2022-03-17 RX ORDER — MAGNESIUM SULFATE 100 %
4 CRYSTALS MISCELLANEOUS AS NEEDED
Status: DISCONTINUED | OUTPATIENT
Start: 2022-03-17 | End: 2022-03-18 | Stop reason: HOSPADM

## 2022-03-17 RX ORDER — DEXTROSE MONOHYDRATE 100 MG/ML
125-250 INJECTION, SOLUTION INTRAVENOUS AS NEEDED
Status: DISCONTINUED | OUTPATIENT
Start: 2022-03-17 | End: 2022-03-18 | Stop reason: HOSPADM

## 2022-03-17 RX ORDER — NALOXONE HYDROCHLORIDE 0.4 MG/ML
0.1 INJECTION, SOLUTION INTRAMUSCULAR; INTRAVENOUS; SUBCUTANEOUS
Status: DISCONTINUED | OUTPATIENT
Start: 2022-03-17 | End: 2022-03-17 | Stop reason: HOSPADM

## 2022-03-17 RX ORDER — ONDANSETRON 2 MG/ML
4 INJECTION INTRAMUSCULAR; INTRAVENOUS
Status: DISCONTINUED | OUTPATIENT
Start: 2022-03-17 | End: 2022-03-18 | Stop reason: HOSPADM

## 2022-03-17 RX ORDER — ASPIRIN 325 MG
325 TABLET ORAL 2 TIMES DAILY
Status: DISCONTINUED | OUTPATIENT
Start: 2022-03-18 | End: 2022-03-17 | Stop reason: SDUPTHER

## 2022-03-17 RX ADMIN — PROPOFOL 25 MCG/KG/MIN: 10 INJECTION, EMULSION INTRAVENOUS at 14:39

## 2022-03-17 RX ADMIN — SODIUM CHLORIDE, POTASSIUM CHLORIDE, SODIUM LACTATE AND CALCIUM CHLORIDE: 600; 310; 30; 20 INJECTION, SOLUTION INTRAVENOUS at 15:38

## 2022-03-17 RX ADMIN — MIDAZOLAM HYDROCHLORIDE 2 MG: 2 INJECTION, SOLUTION INTRAMUSCULAR; INTRAVENOUS at 13:56

## 2022-03-17 RX ADMIN — BUPIVACAINE HYDROCHLORIDE 10 MG: 5 INJECTION, SOLUTION EPIDURAL; INTRACAUDAL at 14:33

## 2022-03-17 RX ADMIN — TRANEXAMIC ACID 1 G: 1 INJECTION, SOLUTION INTRAVENOUS at 14:42

## 2022-03-17 RX ADMIN — KETOROLAC TROMETHAMINE 15 MG: 30 INJECTION, SOLUTION INTRAMUSCULAR; INTRAVENOUS at 23:27

## 2022-03-17 RX ADMIN — INSULIN GLARGINE 24 UNITS: 100 INJECTION, SOLUTION SUBCUTANEOUS at 22:33

## 2022-03-17 RX ADMIN — BUPIVACAINE HYDROCHLORIDE 25 ML: 5 INJECTION, SOLUTION EPIDURAL; INTRACAUDAL; PERINEURAL at 13:56

## 2022-03-17 RX ADMIN — TRANEXAMIC ACID 1 G: 1 INJECTION, SOLUTION INTRAVENOUS at 15:13

## 2022-03-17 RX ADMIN — SODIUM CHLORIDE, POTASSIUM CHLORIDE, SODIUM LACTATE AND CALCIUM CHLORIDE: 600; 310; 30; 20 INJECTION, SOLUTION INTRAVENOUS at 14:19

## 2022-03-17 RX ADMIN — Medication 10 ML: at 18:02

## 2022-03-17 RX ADMIN — INSULIN LISPRO 2 UNITS: 100 INJECTION, SOLUTION INTRAVENOUS; SUBCUTANEOUS at 22:43

## 2022-03-17 RX ADMIN — PROPOFOL 20 MG: 10 INJECTION, EMULSION INTRAVENOUS at 14:48

## 2022-03-17 RX ADMIN — DEXAMETHASONE SODIUM PHOSPHATE 8 MG: 4 INJECTION, SOLUTION INTRAMUSCULAR; INTRAVENOUS at 13:56

## 2022-03-17 RX ADMIN — PHENYLEPHRINE HYDROCHLORIDE 200 MCG: 10 INJECTION INTRAVENOUS at 14:37

## 2022-03-17 RX ADMIN — CEFAZOLIN SODIUM 1 G: 1 INJECTION, POWDER, FOR SOLUTION INTRAMUSCULAR; INTRAVENOUS at 22:33

## 2022-03-17 RX ADMIN — CEFAZOLIN SODIUM 1 G: 1 INJECTION, POWDER, FOR SOLUTION INTRAMUSCULAR; INTRAVENOUS at 22:00

## 2022-03-17 RX ADMIN — PHENYLEPHRINE HYDROCHLORIDE 100 MCG: 10 INJECTION INTRAVENOUS at 15:15

## 2022-03-17 RX ADMIN — OXYCODONE AND ACETAMINOPHEN 1 TABLET: 10; 325 TABLET ORAL at 19:58

## 2022-03-17 RX ADMIN — Medication 10 ML: at 22:42

## 2022-03-17 RX ADMIN — Medication 2 G: at 14:39

## 2022-03-17 RX ADMIN — MIDAZOLAM HYDROCHLORIDE 2 MG: 1 INJECTION, SOLUTION INTRAMUSCULAR; INTRAVENOUS at 14:33

## 2022-03-17 NOTE — OP NOTES
Operative Note    Patient: Jolly Ledbetter MRN: [id number]  Surgery Date: 3/17/2022  [unfilled]          Procedure  Primary Surgeon    LEFT TKA  Umesh Edmonds MD    * Panel 2 does not exist *  * Panel 2 does not exist *    * Panel 3 does not exist *  * Panel 3 does not exist *     Surgeon(s) and Role:     * Umesh Edmonds MD - Primary    Other OR Staff/Assistants:  Circ-1: oRverto Ruiz RN  Registered Nurse First Assistant: Dirk Gregg  Scrub Tech-1: Meka Hill  Scrub Tech-2: Pavithra Douglas    1st Assistant Tasks:  Closing    Pre-operative Diagnosis:  Primary osteoarthritis of left knee [M17.12]    Post-operative Diagnosis: same as preop diagnosis    Anesthesia Type: Spinal     Findings: djd    Complications: No    EBL: 50 cc    Specimens: None    Implants     Implant    Cement Bne Gentamicin 40 Gm Hi Visc Single Dose Cmw 1 - [id number] - Implanted   (Left) Knee    Inventory item: CEMENT BNE GENTAMICIN 40 GM HI VISC SINGLE DOSE CMW 1 Model/Cat number: [id number]    : [employer]WD Lot number: [id number]    Device identifier: [id number] Device identifier type: GS1    GUDID Information     Request status Successful      Brand name: NA Version/Model: T [address] 46 name: Jesse Olivas ([employer]) MRI safety info as of 3/17/22: Labeling does not contain MRI Safety Information    Contains dry or latex rubber: No      GMDN P.T. name: Orthopaedic cement, antimicrobial            As of 3/17/2022     Status: Implanted                  Base Tib Sz 7 Kel Pkt Attune Rp - [id number] - Implanted   (Left) Knee    Inventory item: BASE TIB SZ 7 KEL PKT ATTUNE RP Model/Cat number: [id number]    : [employer]WD Lot number: [id number]    Device identifier: [id number] Device identifier type: GS1    GUDID Information     Request status Successful      Brand name: ATTUNE Version/Model: 1506-    Company name: Jesse Sahae ([employer]) MRI safety info as of 3/17/22: Labeling does not contain MRI Safety Information    Contains dry or latex rubber: No      GMDN P.T. name: Uncoated knee tibia prosthesis, metallic            As of 3/17/2022     Status: Implanted                  Component Fem Sz 6 L Knee Post Stbl Kel Attune - [id number] - Implanted   (Left) Knee    Inventory item: COMPONENT FEM SZ 6 L KNEE POST STBL KEL ATTUNE Model/Cat number: [id number]    : [employer] Lot number: [id number]    Device identifier: [id number] Device identifier type: GS1    GUDID Information     Request status Successful      Brand name: [employer]Dugun.com Version/Model: 1504-    Company name: Ray Mccollum ([place]) MRI safety info as of 3/17/22: Labeling does not contain MRI Safety Information    Contains dry or latex rubber: No      GMDN P.T. name: Uncoated knee femur prosthesis, metallic            As of 3/17/2022     Status: Implanted                  Insert Tib Sz 6 Thk7mm Knee Post Stbl Rot Platfrm Attune - [id number] - Implanted   (Left) Knee    Inventory item: INSERT TIB SZ 6 THK7MM KNEE POST STBL ROT PLATFRM [email or web address] Model/Cat number: [id number]    : [employer] Lot number: [id number]    Device identifier: [id number] Device identifier type: Zivix    GUDID Information     Request status Successful      Brand name: ATTDugun.com Version/Model: 9827-    Company name: Ray Mccollum ([place]) MRI safety info as of 3/17/22: Labeling does not contain MRI Safety Information    Contains dry or latex rubber: No      GMDN P.T. name: Tibial insert            As of 3/17/2022     Status: Implanted                  Pat Ekl Dome Medial 38mm -- Attune - [id number] - Implanted   (Left) Knee    Inventory item: PAT KEL DOME MEDIAL 38MM -- [employer]com Model/Cat number: 1518-    : [employer] Lot number: [id number]    Device identifier: [id number] Device identifier type: GS1    GUDID Information     Request status Successful      Brand name: KAREEN Version/Model: 1518-    Company name: Leslee Roland ([place]) MRI safety info as of 3/17/22: Labeling does not contain MRI Safety Information    Contains dry or latex rubber: No      GMDN P.T. name: Polyethylene patella prosthesis            As of 3/17/2022     Status: Implanted                         OPERATIVE PROCEDURE:  Please note the first assistant role was to help in patient positioning and draping of the extremity in a sterile fashion. Also during the surgery the assistant's responsibilities included but not limited to extremity positioning during critical portions of the surgery. Assisting in using and placement of retractors during surgery. Lower extremity was prepped and draped in a sterile fashion. After adequate anesthesia was given, the patient was placed in a well-padded supine position. Subvastus arthrotomy from the tibial tubercle to the superior pole of the patella was made. Knee was hyperflexed. Intramedullary reaming of distal femur and proximal tibia was performed. 10 mm of distal femur was cut. Anterior-posterior sizing guide was used. Anterior, posterior, chamfer cuts, and box cuts were made next. Proximal tibial cut and preparation performed. Posterior osteophyte meniscal remnants were removed, and also patella was everted. Free-hand cut of the patella was made. Trial components were placed. The patient was found to have excellent range of motion and stability with all trial components. All the trial components removed. Copious irrigation performed. Distal femur, proximal tibia, and patella were impacted in place. Excessive cement was removed. After the cement was hard, Subvastus arthrotomy closed with Vicryl and Prineo stitch. Compressive dressing was applied. The patient was taken to PACU in stable condition.       Slade Delgado MD

## 2022-03-17 NOTE — ANESTHESIA PROCEDURE NOTES
Spinal Block    Start time: 3/17/2022 2:24 PM  End time: 3/17/2022 2:34 PM  Performed by: Savi Richard CRNA  Authorized by: Savi Richard CRNA     Pre-procedure:   Indications: primary anesthetic  Preanesthetic Checklist: patient identified, risks and benefits discussed, anesthesia consent, site marked, patient being monitored and timeout performed    Timeout Time: 14:24 EDT          Spinal Block:   Patient Position:  Seated  Prep Region:  Lumbar      Location:  L3-4  Technique:  Single shot        Needle:   Needle Type:  Pencan  Needle Gauge:  25 G  Attempts:  2      Events: CSF confirmed, no blood with aspiration and no paresthesia        Assessment:  Insertion:  Uncomplicated  Patient tolerance:  Patient tolerated the procedure well with no immediate complications

## 2022-03-17 NOTE — ANESTHESIA PROCEDURE NOTES
Peripheral Block    Start time: 3/17/2022 1:50 PM  End time: 3/17/2022 1:56 PM  Performed by: Dayday Perez CRNA  Authorized by: Dayday Perez CRNA       Pre-procedure: Indications: post-op pain management    Preanesthetic Checklist: patient identified, risks and benefits discussed, site marked, timeout performed, anesthesia consent given and patient being monitored    Timeout Time: 13:50 EDT          Block Type:   Block Type:   Adductor canal block  Laterality:  Left  Monitoring:  Standard ASA monitoring, continuous pulse ox, frequent vital sign checks, heart rate, oxygen and responsive to questions  Injection Technique:  Single shot  Procedures: ultrasound guided    Patient Position: supine  Prep: chlorhexidine    Location:  Mid thigh  Needle Type:  Ultraplex  Needle Gauge:  21 G  Needle Localization:  Ultrasound guidance and anatomical landmarks  Medication Injected:  Midazolam (VERSED) injection, 2 mg  bupivacaine (PF) (MARCAINE) 0.5% injection, 25 mL  dexamethasone (DECADRON) 4 mg/mL injection, 8 mg  Med Admin Time: 3/17/2022 1:56 PM    Assessment:  Number of attempts:  1  Injection Assessment:  Incremental injection every 5 mL, local visualized surrounding nerve on ultrasound, negative aspiration for blood, no intravascular symptoms, ultrasound image on chart and no paresthesia  Patient tolerance:  Patient tolerated the procedure well with no immediate complications

## 2022-03-17 NOTE — DISCHARGE INSTRUCTIONS
TOTAL KNEE REPLACEMENT DISCHARGE INFORMATION    You have undergone a Total Knee Replacement. The following list is to provide you with some expectations over the next week upon your discharge from the hospital.     1. Please begin Aspirin 325mg every 12 hours (twice daily) starting tomorrow as directed until Dr. Josh Flores instructs you to discontinue it. If you are not sure which blood thinner to take please contact Dr. Fernando Jackson office next business day for clarification. 2. Please be sure to continue your thigh-high compression stockings on both sides until instructed to discontinue them. 3. Over the course of the next week, you should continue thigh high stockings on the operative leg, DO NOT GET THE INCISION WET until instructed to do so. Please make sure the stockings on the operative leg are pulled up all the way to the thigh to prevent any creases which may result in abrasions or creases in the skin. If the stockings are creating creases resulting in abrasions or blistering on the operative leg please remove the stockings. You may take the stockings off on the nonoperative leg once you arrive home. 4. You may notice some bruising on your thigh and it may extend all the way to the ankles. That is perfectly normal early on.  5. You may experience a clicking noise in your knee and that is normal because of the artificial knee. 6. It is important to remember if you have any surgical procedure including dental procedures which may result in bleeding that an antibiotic 1 hour before the procedure will be required. Please let the provider performing the procedure know that you have artificial joint. If an antibiotic is not given by them please call our office and give us at least 5 business days to get you the appropriate antibiotics if needed. This rule applies indefinitely. 7. If an Ace wrap is placed on your knee you may remove the Ace wrap only 48 hours after your surgery.   We will leave the stockings on.  8. During the course of your  over the next week, should you experience fevers of 101.5 F, a white drainage from the incision, extreme redness around the incision, or the incision begins to have a pungent smell; Please call our office or page Dr. Yvonne Gonzalez whose numbers are provided in your discharge paperwork. To Page Dr. Yvonne Gonzalez please call [phone] and dial 0. Have the  page whomever is on call for Orthopedics. These are signs of infection and it should be addressed immediately. 9. Please do not drive until instructed to do so. 10. If you need a refill on pain medication please allow at least 2 business days notice for any refills. Immediate refill request may not be possible. Medication refill requests will not be addressed during non-business hours. Please do not page the on-call provider for pain medication refills after hours. 11. It is very important for you to begin your Outpatient Physical Therapy within a couple days of the day of your discharge and your appointment should have been set up. If your physical therapy has not been set up please call our office the next business day for assistance. Details provided in a separate sheet. 12. Remove ace wrap in 48 hrs after surgery but keep stockings on. 15. Finish all antibiotics, start the antibiotics as soon as you go home if you have prescribed antibiotics. 14.  You should perform your daily home exercises at least 4 times a day 30 minutes each time. Perform foot pumps on both feet at least 10 times every 15 minutes while awake. This helps prevent swelling in the leg and can help prevent blood clots in the leg. On the operative leg if you have significant swelling you can also lay down flat and put 3 pillows under the heel so the heel is above the heart level and then perform foot pumps 4 times a day for 10 minutes to help bring the swelling down. 15.  Do not place anything under your knee while sleeping at night.   Elevate your heel so your  is straight while sleeping at night. 16.  Perform deep breathing exercises 10 times every hour while awake. 17.  If you had a nerve block and you are not having pain the day of the surgery, at nighttime it is okay to take 1 pain medication before going to sleep to help prevent excruciating pain when the nerve block wears off. 18.  You may be given an ice pack machine use that to help prevent swelling. Do not apply heat to the incision area. 19.  While you are awake at least 10 times every 30 minutes move your foot up and down as if you are pumping gas from both feet to help prevent swelling and to promote blood circulation in the calf. 20.  If you develop sudden onset of shortness of breath or severe calf pain please go to closest emergency room. 21. Your pain medicine is a Narcotic and may cause constipation. You may take an over the counter stool softener while taking pain medicine. 22.  You may get text messages regarding questionnaires. Please assist Dr. Santana Roblero and filling out those questionnaires. ICE THERAPY WRAP:    · Keep ice therapy wrap on when resting. · DO not wear when moving or walking. · Ice packs are reusable. · Ice Therapy wrap holds two ice packs at a time. Things to watch for:             Increased swelling of the surgical site             Spreading of redness around the incision site             Drainage of pus from the incision site             Developing a fever of 101.5 °F or higher             If any of these symptoms occur you have any questions please contact our office at [phone]. If you need to talk to Dr. Santana Roblero on an urgent basis please call the hospital at [phone]. 0 for the . Please let the  know you are a surgical patient of Dr. Santana Roblero and you wish to get in contact with him. If Dr. Santana Roblero or his staff do not call you back within 30 minutes. Please tell the  to try again.     Phone: [phone]  [email or web address]

## 2022-03-17 NOTE — PROGRESS NOTES
1900-Assumed care of pt from off going nurse. 1958-Pt received PRN percocet for pain 8/10 no other needs voiced, call bell in reach. 2233-HS meds given, call bell in reach, no needs voiced. 2327-PRN Toradol given for breakthrough pain, call bell in reach. 0100-Pt resting in bed, call bell in reach. 0300-Pt sleeping during rounds, call bell in reach. 0516-Pt ripped IV out new 22g placed in Lt hand, IV abt hung and running. 0614-Pt up to bathroom and back to bed, new bag of IV abt hung. No needs voiced, call bell in reach.

## 2022-03-17 NOTE — ANESTHESIA POSTPROCEDURE EVALUATION
Procedure(s):  LEFT TKA.     spinal, MAC, general - backup, regional    Anesthesia Post Evaluation      Multimodal analgesia: multimodal analgesia used between 6 hours prior to anesthesia start to PACU discharge  Patient location during evaluation: PACU  Patient participation: complete - patient participated  Level of consciousness: awake and alert  Pain management: adequate  Airway patency: patent  Anesthetic complications: no  Cardiovascular status: acceptable and stable  Respiratory status: acceptable, spontaneous ventilation and room air  Hydration status: acceptable  Comments: Ok to discharge when post op criteria met  Post anesthesia nausea and vomiting:  none  Final Post Anesthesia Temperature Assessment:  Normothermia (36.0-37.5 degrees C)      INITIAL Post-op Vital signs:   Vitals Value Taken Time   BP 98/55 03/17/22 1619   Temp 36.1 °C (97 °F) 03/17/22 1619   Pulse 76 03/17/22 1619   Resp 16 03/17/22 1619   SpO2 95 % 03/17/22 1619

## 2022-03-17 NOTE — PERIOP NOTES
TRANSFER - OUT REPORT:    Verbal report given to LESLIE Neil LPN(name) on Darcy Needle  being transferred to  260 (unit) for routine post - op       Report consisted of patients Situation, Background, Assessment and   Recommendations(SBAR). Information from the following report(s) SBAR, OR Summary, Procedure Summary, Intake/Output, MAR and Med Rec Status was reviewed with the receiving nurse. Lines:   Peripheral IV 03/17/22 Right Wrist (Active)   Site Assessment Clean, dry, & intact 03/17/22 1624   Phlebitis Assessment 0 03/17/22 1624   Infiltration Assessment 0 03/17/22 1624   Dressing Status Clean, dry, & intact 03/17/22 1624   Dressing Type Transparent 03/17/22 1624   Hub Color/Line Status Pink;Patent 03/17/22 1624   Alcohol Cap Used No 03/17/22 1624        Opportunity for questions and clarification was provided.       Patient transported with:   Registered Nurse

## 2022-03-17 NOTE — INTERVAL H&P NOTE
Update History & Physical    The Patient's History and Physical was reviewed with the patient. The patient was examined. There was no change. The surgical site was confirmed by the patient and me. Patient understands and wants to proceed with the procedure. If applicable, I have discussed with the patient / power of  the rationale for blood component transfusion; its benefits in treating or preventing fatigue, organ damage, or death; and its risk which includes mild transfusion reactions, rare risk of blood borne infection, or more serious but rare reactions. I have discussed the alternatives to transfusion, including the risk and consequences of not receiving transfusion. The patient / Ayo Wolffman of  had an opportunity to ask questions and had agreed to proceed with transfusion of blood components. Plan:  The risk, benefits, expected outcome, and alternative to the recommended procedure have been discussed with the patient.        Electronically signed by Armando Lozano MD on 3/17/2022 at 12:27 PM

## 2022-03-17 NOTE — ANESTHESIA PREPROCEDURE EVALUATION
Relevant Problems   CARDIOVASCULAR   (+) Coronary arteriosclerosis   (+) Essential hypertension      RENAL FAILURE   (+) Chronic kidney disease, stage 3 (HCC)      ENDOCRINE   (+) Type 2 diabetes mellitus with hyperglycemia, without long-term current use of insulin (HCC)       Anesthetic History   No history of anesthetic complications            Review of Systems / Medical History  Patient summary reviewed, nursing notes reviewed and pertinent labs reviewed    Pulmonary  Within defined limits                 Neuro/Psych   Within defined limits           Cardiovascular    Hypertension          CAD and hyperlipidemia    Exercise tolerance: >4 METS  Comments: Last echo 2019 - EF 73%   GI/Hepatic/Renal                Endo/Other    Diabetes: well controlled, type 2    Arthritis     Other Findings            Physical Exam    Airway  Mallampati: II  TM Distance: 4 - 6 cm  Neck ROM: normal range of motion   Mouth opening: Normal     Cardiovascular  Regular rate and rhythm,  S1 and S2 normal,  no murmur, click, rub, or gallop  Rhythm: regular  Rate: normal         Dental  No notable dental hx       Pulmonary  Breath sounds clear to auscultation               Abdominal  GI exam deferred       Other Findings            Anesthetic Plan    ASA: 3  Anesthesia type: spinal, MAC, general - backup and regional - saphenous block      Post-op pain plan if not by surgeon: peripheral nerve block single    Induction: Intravenous  Anesthetic plan and risks discussed with: Patient

## 2022-03-17 NOTE — INTERVAL H&P NOTE
Update History & Physical    The Patient's History and Physical was reviewed with the patient. The patient was examined. There was no change. The surgical site was confirmed by the patient and me. Patient understands and wants to proceed with the procedure. If applicable, I have discussed with the patient / power of  the rationale for blood component transfusion; its benefits in treating or preventing fatigue, organ damage, or death; and its risk which includes mild transfusion reactions, rare risk of blood borne infection, or more serious but rare reactions. I have discussed the alternatives to transfusion, including the risk and consequences of not receiving transfusion. The patient / [facility] of  had an opportunity to ask questions and had agreed to proceed with transfusion of blood components. Plan:  The risk, benefits, expected outcome, and alternative to the recommended procedure have been discussed with the patient.       Electronically signed by ANDRAE Kerr on 3/17/2022 at 1:57 PM

## 2022-03-17 NOTE — H&P
History and Physical    Subjective:     Unique Owusu is a 78 y.o. elderly male with a past medical history for coronary artery disease, hyperlipidemia, hypertension, osteoarthritis, and diabetes mellitus type 2, patient presented to this facility to undergo a total left knee arthroplasty with Dr. Cinthia Prasad, surgical intervention was uneventful. Hospital medicine consulted for overnight observation and medical management. Patient assessed at the bedside, patient is alert and oriented x3, there are no acute signs and symptoms of distress noted at this time. Patient denies shortness of breath, chest pain, nausea, vomiting, and palpitations. Admit patient to medical surgical unit. Past Medical History:   Diagnosis Date    Cataract     Chronic kidney disease, stage 3 ([facility] [address] 2019    stage 3a mild to moderate loss of kidney function    Coronary arteriosclerosis 2017    Coronary artery disease     Hyperlipidemia     Hypertension     Neuropathy     Type 2 diabetes mellitus ([facility]     Vitamin D deficiency       Past Surgical History:   Procedure Laterality Date    HX CATARACT REMOVAL Bilateral     HX COLONOSCOPY       Family History   Problem Relation Age of Onset    Cancer Mother         stomach    Heart Disease Father     Hypertension Father     Cancer Brother         brain tumor      Social History     Tobacco Use    Smoking status: Former Smoker     Packs/day: 2.00     Years: 20.00     Pack years: 40.00     Types: Cigarettes     Quit date:      Years since quittin.2    Smokeless tobacco: Never Used   Substance Use Topics    Alcohol use: No       Prior to Admission medications    Medication Sig Start Date End Date Taking? Authorizing Provider   metoprolol succinate (TOPROL-XL) 100 mg tablet  22  Yes Provider, Historical   oxyCODONE-acetaminophen (Percocet) 5-325 mg per tablet Take 1 Tablet by mouth every four to six (4-6) hours as needed for Pain for up to 14 days.  Max Daily Amount: 6 Tablets. 3/8/22 3/22/22 Yes ANDRAE Barksdale   ondansetron (ZOFRAN ODT) 4 mg disintegrating tablet Take 1 Tablet by mouth every eight (8) hours as needed for Nausea, Vomiting or Nausea or Vomiting for up to 20 doses. 3/8/22  Yes ANDRAE Barksdale   metFORMIN ER (GLUCOPHAGE XR) 500 mg tablet Take 2 Tablets by mouth two (2) times a day. 2/7/22  Yes Pauline Christianson MD   insulin glargine (LANTUS,BASAGLAR) 100 unit/mL (3 mL) inpn 24 Units by SubCUTAneous route nightly 2/7/22  Yes Pauline Christianson MD   insulin glargine (Lantus Solostar U-100 Insulin) 100 unit/mL (3 mL) inpn 24 Units by SubCUTAneous route nightly. 2/2/22  Yes Pauline Christianson MD   lisinopril-hydroCHLOROthiazide (PRINZIDE, ZESTORETIC) 20-12.5 mg per tablet TAKE 1 TABLET BY MOUTH DAILY 2/2/22  Yes Pauline Christianson MD   Accu-Chek Patricia Plus test strp strip USE TO TEST BLOOD SUGAR TWICE DAILY 11/5/21  Yes Pauline Christianson MD   ezetimibe (ZETIA) 10 mg tablet TAKE 1 TABLET BY MOUTH DAILY 9/4/21  Yes Harley Sherwood MD   lancets misc USE TO TEST BLOOD SUGAR TWICE DAILY Dx Code: E11.65 7/28/21  Yes Pauline Christianson MD   cyanocobalamin (VITAMIN B12) 500 mcg tablet Take 500 mcg by mouth daily. Yes Provider, Historical   DULoxetine (CYMBALTA) 20 mg capsule TAKE 1 CAPSULE BY MOUTH EVERY DAY 12/26/20  Yes Cecil Gold NP   Insulin Needles, Disposable, (ALICE PEN NEEDLE) 32 gauge x 5/32\" ndle Use to inject Lantus nightly. DX Code: E11.65 12/20/19  Yes Pauline Christianson MD   ergocalciferol (VITAMIN D2) 50,000 unit capsule Take 5,000 Units by mouth daily.    Yes Provider, Historical   Blood-Glucose Meter monitoring kit Test BID Dx Code: E11.65  Patient not taking: Reported on 10/7/2021 5/24/19   Pauline Christianson MD     No Known Allergies       REVIEW OF SYSTEMS:       Total of 12 systems reviewed as follows:    Positive = Red  Constitutional: Negative for malaise/fatigue and weakness, negative for fever and chills   HENT: Negative for ear pain, headaches, negative for loss of sense of taste and smell   Eyes: Negative for blurred vision and double vision   Skin: Negative for itching, negative for open areas   Cardiovascular: Negative for chest pain, palpitations, negative for swelling   Respiratory: Negative for shortness or breath, negative for cough, negative for sputum production   Gastrointestinal: Negative for abdominal pain, constipation, nausea, vomiting, and diarrhea   Genitourinary: Negative for dysuria, frequency, and hematuria   Musculoskeletal: Negative for joint pain and myalgias   Neurological: Negative for dizziness, seizures, and headaches   Psychiatric: Negative for depression and anxiousness        Objective:   VITALS:    Visit Vitals  BP (!) 105/59 (BP 1 Location: Left upper arm, BP Patient Position: At rest;Reclining)   Pulse 73   Temp (!) 96.7 °F (35.9 °C)   Resp 14   Ht 5' 7\" (1.702 m)   SpO2 94%   BMI 29.46 kg/m²       PHYSICAL EXAM:  Positive = Red  Constitutional: Alert and oriented x 3 and no noted acute distress appears to be stated age. HENT: Atraumatic, nose midline, oropharynx clear ad moist, trachea midline, no supraclavicular   Eyes: Conjunctiva normal and pupils equal   Skin: Dry, intact, warm, and dry   Cardiovascular: Regular rate and rhythm, normal heart sounds, no murmurs, pulses palpable, no noted edema   Respiratory: Lungs clear throughout, no wheezes, rales, or rhonchi, effort normal   Gastrointestinal: Appearance normal, bowel sounds are normal, bowl soft and non-tender   Genitourinary: Deferred   Musculoskeletal: Decreased range of motion to the left lower extremity, brace intact   Neurological: Alert and oriented x 3, awake. No facial droop. No slurred speech. Hand grasps equal. Strength 5/5 in all extremities.  Intact sensations   Psychiatric: Affect normal, Answers questions appropriately     __________________________________________________  Care Plan discussed with:    Comments Patient X    Family      RN     Care Manager                    Consultant:      _______________________________________________________________________  Expected  Disposition:   Home with Family X   HH/PT/OT/RN    SNF/LTC    ROBERTO    ________________________________________________________________________    Labs:  Recent Results (from the past 24 hour(s))   SARS-COV-2    Collection Time: 03/17/22 10:55 AM   Result Value Ref Range    SARS-CoV-2 by PCR Swab     SARS-COV-2    Collection Time: 03/17/22 10:56 AM   Result Value Ref Range    SARS-CoV-2 by PCR Swab     COVID-19 RAPID TEST    Collection Time: 03/17/22 10:56 AM   Result Value Ref Range    COVID-19 rapid test Not Detected Not Detected     GLUCOSE, POC    Collection Time: 03/17/22 12:05 PM   Result Value Ref Range    Glucose (POC) 133 (H) 70 - 110 mg/dL    Performed by Stas Burkett    GLUCOSE, POC    Collection Time: 03/17/22  4:38 PM   Result Value Ref Range    Glucose (POC) 120 (H) 70 - 110 mg/dL    Performed by Nain Santos        Imaging:  No results found. Assessment & Plan:     Osteoarthritis  -Patient status post total left knee arthroplasty  -Continue scheduled antibiotics  -Continue pain management  -Continue brace with ice packs  -PT consulted  -CBC and BMP in a.m. Hypertension  -Chronic/borderline hypotensive  -Holding antihypertensives for now  -Monitor blood pressure closely    Diabetes mellitus type 2  -Chronic  -Holding Metformin, continue 24 units of Lantus at bedtime, implement sliding scale  -Accu-Cheks before meals and before bedtime    Hypercholesteremia  -Chronic/continue Zetia    TOTAL TIME: 30 Minutes    Code Status: Full    Prophylaxis: SCDs    Electronically Signed : KINA Bonilla-BC [address]    Please note that this dictation was completed with [employer], the computer voice recognition software.   Quite often unanticipated grammatical, syntax, homophones, and other interpretive errors are inadvertently transcribed by the computer software. Please disregard these errors. Please excuse any errors that have escaped final proofreading. Thank you.

## 2022-03-17 NOTE — PROGRESS NOTES
Received care of pt. Pt alert. Room air. Dressing and ice packs to left knee. No complaints of pain. Clear liquid diet provided. NP made aware. 1748- Pt assisted to bathroom.

## 2022-03-18 VITALS
SYSTOLIC BLOOD PRESSURE: 132 MMHG | DIASTOLIC BLOOD PRESSURE: 58 MMHG | TEMPERATURE: 97.8 F | BODY MASS INDEX: 30.1 KG/M2 | HEIGHT: 67 IN | RESPIRATION RATE: 18 BRPM | OXYGEN SATURATION: 97 % | HEART RATE: 72 BPM | WEIGHT: 191.8 LBS

## 2022-03-18 PROBLEM — E55.9 VITAMIN D DEFICIENCY: Status: ACTIVE | Noted: 2020-07-29

## 2022-03-18 PROBLEM — E78.2 MIXED HYPERLIPIDEMIA: Status: ACTIVE | Noted: 2019-03-03

## 2022-03-18 LAB
ANION GAP SERPL CALC-SCNC: 13 MMOL/L
BASOPHILS # BLD: 0 K/UL (ref 0–0.1)
BASOPHILS NFR BLD: 0 % (ref 0–2)
BUN SERPL-MCNC: 36 MG/DL (ref 9–21)
BUN/CREAT SERPL: 24
CA-I BLD-MCNC: 8.8 MG/DL (ref 8.5–10.5)
CHLORIDE SERPL-SCNC: 105 MMOL/L (ref 94–111)
CO2 SERPL-SCNC: 20 MMOL/L (ref 21–33)
CREAT SERPL-MCNC: 1.5 MG/DL (ref 0.8–1.5)
DIFFERENTIAL METHOD BLD: ABNORMAL
EOSINOPHIL # BLD: 0 K/UL (ref 0–0.4)
EOSINOPHIL NFR BLD: 0 % (ref 0–5)
ERYTHROCYTE [DISTWIDTH] IN BLOOD BY AUTOMATED COUNT: 13 % (ref 11.6–14.5)
GLUCOSE BLD STRIP.AUTO-MCNC: 216 MG/DL (ref 70–110)
GLUCOSE SERPL-MCNC: 286 MG/DL (ref 70–110)
HCT VFR BLD AUTO: 39.3 % (ref 36–48)
HGB BLD-MCNC: 13.5 G/DL (ref 13–16)
IMM GRANULOCYTES # BLD AUTO: 0.1 K/UL (ref 0–0.04)
IMM GRANULOCYTES NFR BLD AUTO: 0 % (ref 0–0.5)
LYMPHOCYTES # BLD: 0.9 K/UL (ref 0.9–3.6)
LYMPHOCYTES NFR BLD: 7 % (ref 21–52)
MCH RBC QN AUTO: 30.1 PG (ref 24–34)
MCHC RBC AUTO-ENTMCNC: 34.4 G/DL (ref 31–37)
MCV RBC AUTO: 87.7 FL (ref 78–100)
MONOCYTES # BLD: 0.8 K/UL (ref 0.05–1.2)
MONOCYTES NFR BLD: 6 % (ref 3–10)
NEUTS SEG # BLD: 12.2 K/UL (ref 1.8–8)
NEUTS SEG NFR BLD: 87 % (ref 40–73)
NRBC # BLD: 0 K/UL (ref 0–0.01)
NRBC BLD-RTO: 0 PER 100 WBC
PERFORMED BY, TECHID: ABNORMAL
PLATELET # BLD AUTO: 156 K/UL (ref 135–420)
PMV BLD AUTO: 13.2 FL (ref 9.2–11.8)
POTASSIUM SERPL-SCNC: 4.2 MMOL/L (ref 3.2–5.1)
RBC # BLD AUTO: 4.48 M/UL (ref 4.35–5.65)
SARS-COV-2, NAA: NOT DETECTED
SODIUM SERPL-SCNC: 138 MMOL/L (ref 135–145)
WBC # BLD AUTO: 14 K/UL (ref 4.6–13.2)

## 2022-03-18 PROCEDURE — 97116 GAIT TRAINING THERAPY: CPT

## 2022-03-18 PROCEDURE — G0378 HOSPITAL OBSERVATION PER HR: HCPCS

## 2022-03-18 PROCEDURE — 36415 COLL VENOUS BLD VENIPUNCTURE: CPT

## 2022-03-18 PROCEDURE — 97161 PT EVAL LOW COMPLEX 20 MIN: CPT

## 2022-03-18 PROCEDURE — 74011000258 HC RX REV CODE- 258: Performed by: NURSE PRACTITIONER

## 2022-03-18 PROCEDURE — 82962 GLUCOSE BLOOD TEST: CPT

## 2022-03-18 PROCEDURE — 80048 BASIC METABOLIC PNL TOTAL CA: CPT

## 2022-03-18 PROCEDURE — 74011250636 HC RX REV CODE- 250/636: Performed by: NURSE PRACTITIONER

## 2022-03-18 PROCEDURE — 74011250637 HC RX REV CODE- 250/637: Performed by: NURSE PRACTITIONER

## 2022-03-18 PROCEDURE — 74011000250 HC RX REV CODE- 250: Performed by: NURSE PRACTITIONER

## 2022-03-18 PROCEDURE — 74011636637 HC RX REV CODE- 636/637: Performed by: NURSE PRACTITIONER

## 2022-03-18 PROCEDURE — 85025 COMPLETE CBC W/AUTO DIFF WBC: CPT

## 2022-03-18 RX ADMIN — INSULIN LISPRO 4 UNITS: 100 INJECTION, SOLUTION INTRAVENOUS; SUBCUTANEOUS at 07:40

## 2022-03-18 RX ADMIN — ASPIRIN 325 MG: 325 TABLET, COATED ORAL at 09:51

## 2022-03-18 RX ADMIN — Medication 10 ML: at 05:17

## 2022-03-18 RX ADMIN — CEFAZOLIN SODIUM 1 G: 1 INJECTION, POWDER, FOR SOLUTION INTRAMUSCULAR; INTRAVENOUS at 05:16

## 2022-03-18 RX ADMIN — EZETIMIBE 10 MG: 10 TABLET ORAL at 09:51

## 2022-03-18 RX ADMIN — SENNOSIDES 8.6 MG: 8.6 TABLET, COATED ORAL at 09:51

## 2022-03-18 RX ADMIN — CEFAZOLIN SODIUM 1 G: 1 INJECTION, POWDER, FOR SOLUTION INTRAMUSCULAR; INTRAVENOUS at 06:14

## 2022-03-18 RX ADMIN — DULOXETINE HYDROCHLORIDE 20 MG: 20 CAPSULE, DELAYED RELEASE ORAL at 09:51

## 2022-03-18 NOTE — PROGRESS NOTES
Subjective:     Post-Operative Day: 1 Status Post left Total Knee Arthroplasty  Systemic or Specific Complaints:No Complaints    Objective:     Patient Vitals for the past 24 hrs:   BP Temp Pulse Resp SpO2 Height Weight   03/18/22 0726 (!) 132/58 97.8 °F (36.6 °C) 72 18 97 % -- --   03/17/22 2300 121/73 97 °F (36.1 °C) 80 18 100 % -- --   03/17/22 2041 118/67 98.1 °F (36.7 °C) 60 18 98 % -- --   03/17/22 1820 132/76 97.9 °F (36.6 °C) 77 18 97 % -- 191 lb 12.8 oz (87 kg)   03/17/22 1730 120/69 97 °F (36.1 °C) 60 16 95 % -- --   03/17/22 1700 (!) 110/53 97 °F (36.1 °C) 68 16 95 % -- --   03/17/22 1650 (!) 105/59 (!) 96.7 °F (35.9 °C) 73 14 94 % -- --   03/17/22 1635 101/60 -- 71 15 95 % -- --   03/17/22 1620 (!) 98/55 (!) 96.7 °F (35.9 °C) 76 17 94 % -- --   03/17/22 1619 (!) 98/55 97 °F (36.1 °C) 76 16 95 % -- --   03/17/22 1413 (!) 91/50 -- (!) 55 16 100 % -- --   03/17/22 1403 (!) 105/58 -- (!) 58 16 98 % -- --   03/17/22 1358 91/63 -- 65 16 97 % -- --   03/17/22 1353 106/64 -- 65 16 97 % -- --   03/17/22 1204 134/69 97.2 °F (36.2 °C) 70 18 97 % 5' 7\" (1.702 m) --       General: alert, cooperative, no distress, appears stated age   Wound: Wound clean and dry no evidence of infection.    Motion: Good quad strength on active extension   DVT Exam: No evidence of DVT seen on physical exam.     Data Review:    Recent Results (from the past 24 hour(s))   SARS-COV-2    Collection Time: 03/17/22 10:55 AM   Result Value Ref Range    SARS-CoV-2 by PCR Swab     SARS-COV-2    Collection Time: 03/17/22 10:56 AM   Result Value Ref Range    SARS-CoV-2 by PCR Swab     COVID-19 RAPID TEST    Collection Time: 03/17/22 10:56 AM   Result Value Ref Range    COVID-19 rapid test Not Detected Not Detected     GLUCOSE, POC    Collection Time: 03/17/22 12:05 PM   Result Value Ref Range    Glucose (POC) 133 (H) 70 - 110 mg/dL    Performed by Maria G Banks    GLUCOSE, POC    Collection Time: 03/17/22  4:38 PM   Result Value Ref Range Glucose (POC) 120 (H) 70 - 110 mg/dL    Performed by Heidi Isaacs, POC    Collection Time: 03/17/22 10:16 PM   Result Value Ref Range    Glucose (POC) 178 (H) 70 - 110 mg/dL    Performed by [address], BASIC    Collection Time: 03/18/22  4:00 AM   Result Value Ref Range    Sodium 138 135 - 145 mmol/L    Potassium 4.2 3.2 - 5.1 mmol/L    Chloride 105 94 - 111 mmol/L    CO2 20 (L) 21 - 33 mmol/L    Anion gap 13 mmol/L    Glucose 286 (H) 70 - 110 mg/dL    BUN 36 (H) 9 - 21 mg/dL    Creatinine 1.50 0.8 - 1.50 mg/dL    BUN/Creatinine ratio 24      GFR est AA 54 ml/min/1.73m2    GFR est non-AA 45 ml/min/1.73m2    Calcium 8.8 8.5 - 10.5 mg/dL   CBC WITH AUTOMATED DIFF    Collection Time: 03/18/22  4:00 AM   Result Value Ref Range    WBC 14.0 (H) 4.6 - 13.2 K/uL    RBC 4.48 4.35 - 5.65 M/uL    HGB 13.5 13.0 - 16.0 g/dL    HCT 39.3 36.0 - 48.0 %    MCV 87.7 78.0 - 100.0 FL    MCH 30.1 24.0 - 34.0 PG    MCHC 34.4 31.0 - 37.0 g/dL    RDW 13.0 11.6 - 14.5 %    PLATELET 906 038 - 151 K/uL    MPV 13.2 (H) 9.2 - 11.8 FL    NRBC 0.0 0.0  WBC    ABSOLUTE NRBC 0.00 0.00 - 0.01 K/uL    NEUTROPHILS 87 (H) 40 - 73 %    LYMPHOCYTES 7 (L) 21 - 52 %    MONOCYTES 6 3 - 10 %    EOSINOPHILS 0 0 - 5 %    BASOPHILS 0 0 - 2 %    IMMATURE GRANULOCYTES 0 0 - 0.5 %    ABS. NEUTROPHILS 12.2 (H) 1.8 - 8.0 K/UL    ABS. LYMPHOCYTES 0.9 0.9 - 3.6 K/UL    ABS. MONOCYTES 0.8 0.05 - 1.2 K/UL    ABS. EOSINOPHILS 0.0 0.0 - 0.4 K/UL    ABS. BASOPHILS 0.0 0.0 - 0.1 K/UL    ABS. IMM. GRANS. 0.1 (H) 0.00 - 0.04 K/UL    DF AUTOMATED     GLUCOSE, POC    Collection Time: 03/18/22  7:23 AM   Result Value Ref Range    Glucose (POC) 216 (H) 70 - 110 mg/dL    Performed by Meenakshi Juan          Assessment:     Status Post left Total Knee Arthroplasty. Doing well postoperatively.      Plan:     Discharge today after up with PT  Return to Clinic: 1 week

## 2022-03-18 NOTE — PROGRESS NOTES
Problem: Mobility Impaired (Adult and Pediatric)  Goal: *Acute Goals and Plan of Care (Insert Text)  Description: Pt is MOD (I)  with gait and navigates stairs with MOD ()I . PLOF: Community ambulator, no AD, (I) ADLs. Outcome: Resolved/Met     Problem: Patient Education: Go to Patient Education Activity  Goal: Patient/Family Education  Outcome: Resolved/Met   PHYSICAL THERAPY EVALUATION AND DISCHARGE    Patient: Darcy Cruz (78 y.o. male)  Date: 3/18/2022  Primary Diagnosis: Primary osteoarthritis of left knee [M17.12]  OA (osteoarthritis) of knee [M17.10]  S/P total knee replacement, left [Z96.652]  Procedure(s) (LRB):  LEFT TKA (Left) 1 Day Post-Op   Precautions:  WBAT    ASSESSMENT :  Based on the objective data described below, the patient presents s/p L TKA and he is WBAT. He is able to ambulate with a RW with MOD (I) and he is able to navigate stairs with MOD (I). He is educated on a HEP and tolerates well. He can return home with outpatient P.T. Patient does not require further skilled intervention at this level of care. PLAN :  Recommendations and Planned Interventions:   No formal PT needs identified at this time. Discharge Recommendations: Outpatient  Further Equipment Recommendations for Discharge: N/A     SUBJECTIVE:   Patient states i'm doing good.     OBJECTIVE DATA SUMMARY:     Past Medical History:   Diagnosis Date    Cataract     Chronic kidney disease, stage 3 ([address] 7/24/2019    stage 3a mild to moderate loss of kidney function    Coronary arteriosclerosis 6/29/2017    Coronary artery disease     Hyperlipidemia     Hypertension     Neuropathy     Type 2 diabetes mellitus ([address]     Vitamin D deficiency      Past Surgical History:   Procedure Laterality Date    HX CATARACT REMOVAL Bilateral     HX COLONOSCOPY  2005     Barriers to Learning/Limitations: None  Compensate with: N/A  Home Situation:   Home Situation  Home Environment: Private residence  # Steps to Enter: 6  Rails to Enter: Yes  Hand Rails : Bilateral  One/Two Story Residence: One story  Living Alone: Yes  Support Systems: Child(zuleyma)  Patient Expects to be Discharged to[de-identified] Home with outpatient services  Current DME Used/Available at Home: Valentino Sings, tiffany,Cane, straight  Critical Behavior:  Neurologic State: Alert  Orientation Level: Oriented X4  Skin Integrity: Incision (comment)  Skin Integumentary  Skin Integrity: Incision (comment)    Strength:    Strength: Generally decreased, functional (L knee 4/5, SLR 1745, 3.25 hours after spinal)  Tone & Sensation:   Tone: Normal  Sensation: Intact  Range Of Motion:   AROM: Generally decreased, functional (L knee 8-103)  PROM: Generally decreased, functional (L knee 6-111)    Posture:  Posture (WDL): Within defined limits  Functional Mobility:  Bed Mobility:  Rolling: Modified independent  Supine to Sit: Modified independent  Sit to Supine: Modified independent  Scooting: Modified independent  Transfers:  Sit to Stand: Modified independent  Stand to Sit: Modified independent    Balance:   Sitting: Intact  Standing: Intact  Ambulation/Gait Training:  Distance (ft): 450 Feet (ft)  Assistive Device: Walker, rolling  Ambulation - Level of Assistance: Modified independent     Gait Description (WDL): Exceptions to WDL  Gait Abnormalities: Antalgic     Left Side Weight Bearing: As tolerated  Stairs:  Number of Stairs Trained: 4 (nonreciprocating)  Stairs - Level of Assistance: Modified independent  Rail Use: Both     Today's TX:   Pt is able to ambulate with RW  with a MOD (I). He is able to navigate stairs with MOD (I). He is educated on a HEP and states understanding. Pain:  Pain level pre-treatment: 0/10   Pain level post-treatment: 3/10  Pain Location: L knee  Pain Intervention(s): Medication (see MAR);  Rest, Ice, Repositioning   Response to intervention: Nurse notified, See doc flow    Activity Tolerance:   Good  Please refer to the flowsheet for vital signs taken during this treatment. After treatment:   []         Patient left in no apparent distress sitting up in chair  [x]         Patient left in no apparent distress in bed  [x]         Call bell left within reach  [x]         Nursing notified  []         Caregiver present  []         Bed alarm activated  []         SCDs applied    COMMUNICATION/EDUCATION:   [x]         Role of Physical Therapy in the acute care setting. [x]         Fall prevention education was provided and the patient/caregiver indicated understanding. [x]         Patient/family have participated as able in goal setting and plan of care. [x]         Patient/family agree to work toward stated goals and plan of care. []         Patient understands intent and goals of therapy, but is neutral about his/her participation. []         Patient is unable to participate in goal setting/plan of care: ongoing with therapy staff.  []         Other:     Thank you for this referral.  Angela Orellana, PT, DPT   Time Calculation: 25 mins

## 2022-03-18 NOTE — PROGRESS NOTES
Problem: Pain  Goal: *Control of Pain  3/18/2022 0955 by Andrey Dhillon  Outcome: Resolved/Met  3/18/2022 0950 by Andrey Dhillon  Outcome: Resolved/Not Met  3/18/2022 0947 by Andrey Dhillon  Outcome: Progressing Towards Goal  Goal: *PALLIATIVE CARE:  Alleviation of Pain  3/18/2022 0955 by Andrey Dhillon  Outcome: Resolved/Met  3/18/2022 0950 by Andrey Dhillon  Outcome: Resolved/Not Met  3/18/2022 0947 by Andrey Dhillon  Outcome: Progressing Towards Goal

## 2022-03-18 NOTE — PROGRESS NOTES
Problem: Pain  Goal: *Control of Pain  3/18/2022 0950 by Teresita Solano  Outcome: Resolved/Not Met  3/18/2022 0947 by Teresita Solano  Outcome: Progressing Towards Goal  Goal: *PALLIATIVE CARE:  Alleviation of Pain  3/18/2022 0950 by Teresita Solano  Outcome: Resolved/Not Met  3/18/2022 0947 by Teresita Solano  Outcome: Progressing Towards Goal

## 2022-03-18 NOTE — DISCHARGE SUMMARY
Discharge Summary       PATIENT ID: Karlee Lundy  MRN: [id number]   YOB: 1940    DATE OF ADMISSION: 3/17/2022 10:50 AM    DATE OF DISCHARGE: 03/18/22    PRIMARY CARE PROVIDER: Elizabeth Mott MD     ATTENDING PHYSICIAN: Radha Reyes MD  DISCHARGING PROVIDER: Radha Reyes MD        CONSULTATIONS: IP CONSULT TO ORTHOPEDIC SURGERY  IP CONSULT TO HOSPITALIST    PROCEDURES/SURGERIES: Procedure(s) with comments:  LEFT TKA - left adductor canal block    ADMITTING [address] [place] [address] COURSE:   Karlee Lundy is a 78 y.o. elderly male with a past medical history for coronary artery disease, hyperlipidemia, hypertension, osteoarthritis, and diabetes mellitus type 2, patient presented to this facility to undergo a total left knee arthroplasty with Dr. Francisco Schwartz, surgical intervention was uneventful. Hospital medicine consulted for overnight observation and medical management. Patient assessed at the bedside, patient is alert and oriented x3, there are no acute signs and symptoms of distress noted at this time. Patient denies shortness of breath, chest pain, nausea, vomiting, and palpitations.     Admit patient to medical surgical unit.         DISCHARGE DIAGNOSES / PLAN:      Assessment & Plan:      Osteoarthritis  -Patient status post total left knee arthroplasty  -Continue scheduled antibiotics  -Continue pain management  -Continue brace with ice packs  -PT consulted  -CBC and BMP in a.m.     Hypertension  -Chronic/borderline hypotensive  -Holding antihypertensives for now  -Monitor blood pressure closely     Diabetes mellitus type 2  -Chronic  -Holding Metformin, continue 24 units of Lantus at bedtime, implement sliding scale  -Accu-Cheks before meals and before bedtime     Hypercholesteremia  -Chronic/continue Zetia      Pt has urinated, pain is controlled, ok for dc after PT today    FOLLOW UP APPOINTMENTS:    Follow-up Information     Follow up With Specialties Details Why Contact Info ANDRAE Oakes Nurse Practitioner Follow up in 1 week(s) March 24, 2022 @ 11:45 a.m. with Hermelinda Lee  [address] [facility]      [place] , [address]   [address] [facility]  [address] [facility] ([place], [place]) [address]  [address]                DIET: diabetic diet      DISCHARGE MEDICATIONS:  Current Discharge Medication List      CONTINUE these medications which have NOT CHANGED    Details   metoprolol succinate (TOPROL-XL) 100 mg tablet       oxyCODONE-acetaminophen (Percocet) 5-325 mg per tablet Take 1 Tablet by mouth every four to six (4-6) hours as needed for Pain for up to 14 days. Max Daily Amount: 6 Tablets. Qty: 30 Tablet, Refills: 0    Comments: Do no take until after surgery  Associated Diagnoses: Osteoarthritis of left knee, unspecified osteoarthritis type      ondansetron (ZOFRAN ODT) 4 mg disintegrating tablet Take 1 Tablet by mouth every eight (8) hours as needed for Nausea, Vomiting or Nausea or Vomiting for up to 20 doses. Qty: 20 Tablet, Refills: 0      metFORMIN ER (GLUCOPHAGE XR) 500 mg tablet Take 2 Tablets by mouth two (2) times a day. Qty: 360 Tablet, Refills: 3    Associated Diagnoses: Type 2 diabetes mellitus with hyperglycemia, without long-term current use of insulin ([address]      ! ! insulin glargine (LANTUS,BASAGLAR) 100 unit/mL (3 mL) inpn 24 Units by SubCUTAneous route nightly  Qty: 15 mL, Refills: 1    Comments: Temporary supply until mail order delivers  Associated Diagnoses: Type 2 diabetes mellitus with hyperglycemia, without long-term current use of insulin ([address]      ! ! insulin glargine (Lantus Solostar U-100 Insulin) 100 unit/mL (3 mL) inpn 24 Units by SubCUTAneous route nightly.   Qty: 30 mL, Refills: 3    Associated Diagnoses: Type 2 diabetes mellitus with hyperglycemia, without long-term current use of insulin (HCC)      lisinopril-hydroCHLOROthiazide (PRINZIDE, ZESTORETIC) 20-12.5 mg per tablet TAKE 1 TABLET BY MOUTH DAILY  Qty: 90 Tablet, Refills: 3    Associated Diagnoses: Type 2 diabetes mellitus with hyperglycemia, without long-term current use of insulin ([facility])      Accu-Chek Patricia Plus test strp strip USE TO TEST BLOOD SUGAR TWICE DAILY  Qty: 100 Strip, Refills: 5    Associated Diagnoses: Type 2 diabetes mellitus with hyperglycemia, without long-term current use of insulin ([facility])      ezetimibe (ZETIA) 10 mg tablet TAKE 1 TABLET BY MOUTH DAILY  Qty: 90 Tablet, Refills: 1    Associated Diagnoses: Mixed hyperlipidemia; Coronary arteriosclerosis      lancets misc USE TO TEST BLOOD SUGAR TWICE DAILY Dx Code: E11.65  Qty: 200 Each, Refills: 3    Associated Diagnoses: Type 2 diabetes mellitus with hyperglycemia, without long-term current use of insulin ([facility])      cyanocobalamin (VITAMIN B12) 500 mcg tablet Take 500 mcg by mouth daily. DULoxetine (CYMBALTA) 20 mg capsule TAKE 1 CAPSULE BY MOUTH EVERY DAY  Qty: 90 Cap, Refills: 0      Insulin Needles, Disposable, (ALICE PEN NEEDLE) 32 gauge x 5/32\" ndle Use to inject Lantus nightly. DX Code: E11.65  Qty: 100 Pen Needle, Refills: 3    Associated Diagnoses: Type 2 diabetes mellitus with hyperglycemia, without long-term current use of insulin ([facility])      ergocalciferol (VITAMIN D2) 50,000 unit capsule Take 5,000 Units by mouth daily. Blood-Glucose Meter monitoring kit Test BID Dx Code: E11.65  Qty: 1 Kit, Refills: 0    Comments: DISPENSE WHAT IS COVERED  Associated Diagnoses: Type 2 diabetes mellitus with hyperglycemia, without long-term current use of insulin ([facility]       ! ! - Potential duplicate medications found. Please discuss with provider. NOTIFY YOUR PHYSICIAN FOR ANY OF THE FOLLOWING:   Fever over 101 degrees for 24 hours. Chest pain, shortness of breath, fever, chills, nausea, vomiting, diarrhea, change in mentation, falling, weakness, bleeding. Severe pain or pain not relieved by medications.   Or, any other signs or symptoms that you may have questions about.    DISPOSITION: home    PHYSICAL EXAMINATION AT DISCHARGE:  General:          Alert, cooperative, no distress, appears stated age. HEENT:           Atraumatic, anicteric sclerae, pink conjunctivae                          No oral ulcers, mucosa moist, throat clear, dentition fair  Neck:               Supple, symmetrical  Lungs:             Clear to auscultation bilaterally. No Wheezing or Rhonchi. No rales. Chest wall:      No tenderness  No Accessory muscle use. Heart:              Regular  rhythm,  No  murmur   No edema  Abdomen:        Soft, non-tender. Not distended. Bowel sounds normal  Extremities:     No cyanosis. No clubbing,                            Skin turgor normal, Capillary refill normal  Skin:                Not pale. Not Jaundiced  No rashes   Psych:             Not anxious or agitated.   Neurologic:      Alert, moves all extremities, answers questions appropriately and responds to commands       CHRONIC MEDICAL DIAGNOSES:  Problem List as of 3/18/2022 Date Reviewed: 3/10/2022          Codes Class Noted - Resolved    OA (osteoarthritis) of knee ICD-10-CM: M17.10  ICD-9-CM: 715.36  3/17/2022 - Present        S/P total knee replacement, left ICD-10-CM: J63.003  ICD-9-CM: V43.65  3/17/2022 - Present        Osteoarthritis of left knee ICD-10-CM: M17.12  ICD-9-CM: 715.96  10/7/2021 - Present    Overview Signed 10/7/2021  2:27 PM by Geneva Larios MD     Follows with orthopedic surgery             Polyneuropathy, unspecified ICD-10-CM: G62.9  ICD-9-CM: 356.9  10/10/2020 - Present        Hereditary and idiopathic neuropathy, unspecified ICD-10-CM: G60.9  ICD-9-CM: 356.9  7/29/2020 - Present        Vitamin D deficiency ICD-10-CM: E55.9  ICD-9-CM: 268.9  7/29/2020 - Present        Chronic kidney disease, stage 3 ([address] ICD-10-CM: N18.30  ICD-9-CM: 585.3  7/24/2019 - Present    Overview Signed 3/16/2021  9:26 AM by Tae Naylor CMA     stage 3a mild to moderate loss of kidney function Type 2 diabetes mellitus with hyperglycemia, without long-term current use of insulin (HCC) ICD-10-CM: E11.65  ICD-9-CM: 250.00, 790.29  3/3/2019 - Present        Essential hypertension ICD-10-CM: I10  ICD-9-CM: 401.9  3/3/2019 - Present        Mixed hyperlipidemia ICD-10-CM: E78.2  ICD-9-CM: 272.2  3/3/2019 - Present        Coronary arteriosclerosis ICD-10-CM: I25.10  ICD-9-CM: 414.00  6/29/2017 - Present              Greater than 35 minutes were spent with the patient on counseling and coordination of care    Signed:   Colin Mcqueen MD  3/18/2022  9:25 AM

## 2022-03-18 NOTE — PROGRESS NOTES
0700-Report received from off going nurse. Assumed care of patient. 0845-Patient walking with PT in hallway. 0951-Scheduled meds given. Patient tolerated well. 1150-Discharge instructions given. Patient verbalized understanding. IV removed. No complications.

## 2022-03-19 PROBLEM — M17.12 OSTEOARTHRITIS OF LEFT KNEE: Status: ACTIVE | Noted: 2021-10-07

## 2022-03-19 PROBLEM — G60.9 HEREDITARY AND IDIOPATHIC NEUROPATHY, UNSPECIFIED: Status: ACTIVE | Noted: 2020-07-29

## 2022-03-19 PROBLEM — I25.10 CORONARY ARTERIOSCLEROSIS: Status: ACTIVE | Noted: 2017-06-29

## 2022-03-19 PROBLEM — N18.30 CHRONIC KIDNEY DISEASE, STAGE 3 (HCC): Status: ACTIVE | Noted: 2019-07-24

## 2022-03-19 PROBLEM — E11.65 TYPE 2 DIABETES MELLITUS WITH HYPERGLYCEMIA, WITHOUT LONG-TERM CURRENT USE OF INSULIN (HCC): Status: ACTIVE | Noted: 2019-03-03

## 2022-03-19 PROBLEM — G62.9 POLYNEUROPATHY, UNSPECIFIED: Status: ACTIVE | Noted: 2020-10-10

## 2022-03-19 NOTE — PROGRESS NOTES
Care Management Interventions  PCP Verified by CM: Yes  Palliative Care Criteria Met (RRAT>21 & CHF Dx)?: No  Mode of Transport at Discharge: Other (see comment) (Family)  Transition of Care Consult (CM Consult): Discharge Planning  MyChart Signup: No  Discharge Durable Medical Equipment: No  Health Maintenance Reviewed: Yes  Physical Therapy Consult: Yes  Occupational Therapy Consult: No  Speech Therapy Consult: No  Support Systems: Child(zuleyma)  Confirm Follow Up Transport: Family  The Patient and/or Patient Representative was Provided with a Choice of Provider and Agrees with the Discharge Plan?: Yes  Freedom of Choice List was Provided with Basic Dialogue that Supports the Patient's Individualized Plan of Care/Goals, Treatment Preferences and Shares the Quality Data Associated with the Providers?: Yes  Discharge Location  Patient Expects to be Discharged to[de-identified] Home   Reason for Admission: Chart reviewed and noted patient presented to the hospital for surgery- Left TKA. Dx: Osteoarthritis of Left Knee     PMH: Coronary Arteriosclerosis, HTN, CKD- Stage 3, DM, CAD, Hyperlipidemia, Arthritis                      RUR Score: NA                    Plan for utilizing home health: TBD         PCP: First and Last name:  Evy Monroe MD     Name of Practice:    Are you a current patient: Yes/No:    Approximate date of last visit:    Can you participate in a virtual visit with your PCP:                     Current Advanced Directive/Advance Care Plan: Prior- No ACP      Healthcare Decision Maker: Bunny Orozco- [phone]  Click here to [address] including selection of the Healthcare Decision Maker Relationship (ie \"Primary\")                             Transition of Care Plan: Discharge planning is aimed at transition to home. He has a cane and a wheeled walker at home. He has appointments with both Dr. Ericka Jolly office and with PT. He will be returning back home at discharge.

## 2022-03-20 PROBLEM — I10 ESSENTIAL HYPERTENSION: Status: ACTIVE | Noted: 2019-03-03

## 2022-03-21 ENCOUNTER — PATIENT OUTREACH (OUTPATIENT)
Dept: CASE MANAGEMENT | Age: 82
End: 2022-03-21

## 2022-03-21 ENCOUNTER — HOSPITAL ENCOUNTER (OUTPATIENT)
Dept: PHYSICAL THERAPY | Age: 82
Discharge: HOME | End: 2022-03-21
Payer: MEDICARE

## 2022-03-21 PROCEDURE — 97162 PT EVAL MOD COMPLEX 30 MIN: CPT

## 2022-03-21 PROCEDURE — 97110 THERAPEUTIC EXERCISES: CPT

## 2022-03-21 NOTE — PROGRESS NOTES
Care Transitions Initial Call    Call within 2 business days of discharge: Yes     Patient: Sachin Connell Patient : 1940 MRN: [id number]    Last Discharge [address]       Complaint Diagnosis Description Type Department Provider    3/17/22   Admission (Discharged) Cordell Chau MD          Was this an external facility discharge? No Discharge Facility: [facility] 3/17/2022 to 3/18/2022. Challenges to be reviewed by the provider   Additional needs identified to be addressed with provider: no  none         Method of communication with provider : none    Discussed COVID-19 related testing which was available at this time. Test results were negative. Patient informed of results, if available? yes     Advance Care Planning:   Does patient have an Advance Directive:  health care decision makers updated    Inpatient Readmission Risk score: No data recorded  Was this a readmission? no   Patient stated reason for the admission: jiffy knee    Patients top risk factors for readmission: functional physical ability, medical condition-total knee arthroplasty and support system   Interventions to address risk factors: Scheduled appointment with PCP-Ethan and Scheduled appointment with Specialist-Berlin WANG 3/24    Care Transition Nurse (CTN) contacted the patient by telephone to perform post hospital discharge assessment. Verified name and  with patient as identifiers. Provided introduction to self, and explanation of the CTN role. CTN reviewed discharge instructions, medical action plan and red flags with patient who verbalized understanding. Were discharge instructions available to patient? yes. Reviewed appropriate site of care based on symptoms and resources available to patient including: PCP, Specialist, MyChart Messaging and outpatient PT. Patient given an opportunity to ask questions and does not have any further questions or concerns at this time.  The patient agrees to contact the PCP office for questions related to their healthcare. Medication reconciliation was performed with patient, who verbalizes understanding of administration of home medications. Advised obtaining a 90-day supply of all daily and as-needed medications. Referral to Pharm D needed: no     Home Health/Outpatient orders at discharge: [address]: [facility]  Date of initial visit: 3/21/2022    Durable Medical Equipment ordered at discharge: None  [address]: [address] received: n/a    Covid Risk Education    Educated patient about risk for severe COVID-19 due to risk factors according to CDC guidelines. CTN reviewed discharge instructions, medical action plan and red flag symptoms with the patient who verbalized understanding. Discussed COVID vaccination status: yes. Education provided on COVID-19 vaccination as appropriate. Discussed exposure protocols and quarantine with CDC Guidelines. Patient was given an opportunity to verbalize any questions and concerns and agrees to contact CTN or health care provider for questions related to their healthcare. Was patient discharged with a pulse oximeter? no. Discussed and confirmed pulse oximeter discharge instructions and when to notify provider or seek emergency care. Discussed follow-up appointments. If no appointment was previously scheduled, appointment scheduling offered: yes. Is follow up appointment scheduled within 7 days of discharge? yes.    [facility] follow up appointment(s):   Future Appointments   Date Time Provider Kiran Montoya   3/21/2022  1:00 PM Jan Solorzano [place], [place], DPT [facility]   3/24/2022 11:45 AM ANDRAE Emery BS AMB   4/5/2022 10:00 AM Eliane Brewer MD [facility] BS AMB   4/5/2022  2:15 PM SPEC CDE LAB CDE BS AMB   4/12/2022  2:30 PM Kusum Fuller MD CDE BS AMB     Non-[facility] follow up appointment(s): n/a    Plan for follow-up call in 5-7 days based on severity of symptoms and risk factors. Plan for next call: symptom management-ensure that pain is under control  CTN provided contact information for future needs. Goals Addressed                 This Visit's Progress     Prevent complications post hospitalization. 1. CTN will monitor X 4 weeks    2. Ensure provider appt is scheduled within 7 days post-discharge 3/21/2022 patient sees ortho NP 3/24/2022 at 11:45 am and sees PCP 4/5/2022 at 10 am    3. Confirm patient attended post-discharge provider apt    4. Complete post-visit call to confirm attendance and update care needs  3/21/2022 patient stated that he is doing well. He is having some pain, but he is up and moving about. Encouraged patient to sit with leg elevated and use ice several times per day. 5. Review/educate common or potential \"red flags\" of condition worsening 3/21/2022 Reviewed signs/symptoms of knee replacement such as infection, stiffness, pain, implant issues- allergy or malfunction of device to name a few. 6. Evaluate adherence to medications and priority barriers to resolve  3/21/2022 Patient stated that he has all meds and is taking as directed. 7. Instruct on adherence to medications as ordered and assess for therapeutic response and side-effects  3/21/2022 patient stated that he is aware of why he takes meds and knows when to call physician for issues. 8. Discuss and evaluate ADL performance. Provide recommendations on energy conservation, particularly related to transition home from an inpatient admission. 3/21/2022 Patient stated that he is up moving about. Encouraged patient to use walker for extra support. He begins PT today. When he rests- he is elevating and using ice.

## 2022-03-21 NOTE — PROGRESS NOTES
[address] [address] [facility] [address] [facility], [address] [place] [address]  Ph: [phone]    Fax: [phone]    Initial Evaluation/Plan of Care/Statement of Necessity for Physical Therapy Services     Patient name: Sachin Connell    Date/Start of Care:3/21/2022  : 1940  [x]  Patient  Verified  Provider#: [id number]          Referral source: Jose Crouch MD    Return visit to MD: 22     Medical/Treatment Diagnosis: Pain in left knee [M25.562]  Unilateral primary osteoarthritis, left knee [M17.12]    Payor: [place] MEDICARE / Plan: [place] MEDICARE PART A & B / Product Type: Medicare /       Prior Hospitalization: see medical history     Comorbidities: see intake summary   Prior Level of Function: Indep with ADLs, indep with ambulation using knee brace   Medications: Verified on Patient Summary List          Patient / Family readiness to learn indicated by: asking questions, trying to perform skills and interest  Persons(s) to be included in education: patient (P)  Barriers to Learning/Limitations: None  Patient Self Reported Health Status: good  Rehabilitation Potential: good  Previous Treatment/Compliance: PT for strengthening and balance prior to the sx   PMHx/Surgical Hx: see intake summary   Work Hx: retired   Living Situation: Lives with his son in a one story home with 6 JESS with railings. Barriers to progress: none   Motivation: good   Substance use: none   Cognition: A & O x 4  Onset Date: 3/17/22   SUBJECTIVE  Mechanism of Injury/History of Complaint: s/p L TKR on 3/17/22 with one night stay. H.o. severe OA with worsening pain. Pt states pain has been manageable since the surgery. Intermittent L knee pain. Pt reports he does have some swelling. Pt states he did not know to ice the knee or use the RW until he had a tele visit with the MD today. No difficulty sleeping. Pt states he is only taking the medication as needed.      Area of pain: L knee    Pain Descriptors:  achiness   Pain Level (0-10 scale)  At rest: 0-3 With activity: 7-8   Worst: 8 Least: 0  Things that worsen pain: nothing in particular   Things that ease pain: medication, rest   Objective/Functional Measures including ROM/MMT:   Physical Findings   Ortho:   Posture:  Slight forward flexed posture   Gait and Functional Mobility:  Ambulating with RW - good step length and heel strike   WBS:  WBAT  Palpation: TTP medial knee   Swelling: +swelling  - Girth measurement L41 cm joint line, suprapatella  42cm   Gross findings:  Signs of inflammation - bandage intact with compression stocking and ace wrap    Specific joints: *normal values in ()  KNEE        AROM          PROM                       MMT   R L R L R L   Extension (0)  (-)5 (-)7   5 3-   Flexion [phone]   5 4+   Patellar Mobility:  Good patella mobility   Additional comments: unable to fully extend L knee in seated position     HIP     AROM       PROM             MMT   R L R L R L   Flexion (120)     5 4   Extension (15)         Abduction (40)         Adduction (30)         IR (40)         ER (40)         Additional comments:     ANKLE                               AROM                     PROM                     MMT:   R L R L R L   Dorsiflexion (15)      5 5   Plantarflexion (50)         Inversion (35)          Eversion (25)         Additional comments:   Mobility Assessment: TUG score 25 seconds, no AD: step to pattern on stairs       Neurological: Reflexes / Sensations: intact   Special Tests: none    Ampac Score:   43.72%  ASSESSMENT/Changes in Function:   Pt referred to PT services 4 days post op L TKA. Pt is progressing well at this time with intermittent L knee pain, good L knee flexion ROM and minimal gait deviations. He does have swelling in the knee, decrease L knee extension ROM, and decrease quad strength. Pt will benefit from skilled PT services with assist in maximizing his functional mobility independence.   Problem List/Impairments: pain affecting function, decrease ROM, decrease strength, impaired gait/ balance, decrease activity tolerance and decrease flexibility/ joint mobility  Treatment Plan may include any combination of the following: Therapeutic exercise, Therapeutic activities, Neuromuscular re-education, Physical agent/modality, Gait/balance training, Manual therapy, Patient education, Functional mobility training and Stair training  Patient/ Caregiver education and instruction: activity modification and exercises  Frequency / Duration: Patient to be seen 2-3 times per week for 16-24 treatments. Certification Period: 3/21/22 - 6/21/22  Patient Goal (s): Get back to normal.    [] Met [] Not met [] Partially met   Short Term Goals: To be accomplished in 8-12 treatments. 1.  Indep with a HEP to assist in rehab progression. [] Met [] Not met [] Partially met    2. L knee extension to 0 degrees to improve the ability to straighten the L knee. [] Met [] Not met [] Partially met    3. Improvement in L quad strength, Pt will be able to fully extend his knee in seated position. [] Met [] Not met [] Partially met    4. 1-2 cm improvement in swelling to improve mobility of the L knee. [] Met [] Not met [] Partially met    Long Term Goals: To be accomplished in 16-24 treatments. 1.  Pt will be able to ambulate independently without gait deviations or increase in pain. [] Met [] Not met [] Partially met   2. Normalized stair climb with use of railing. [] Met [] Not met [] Partially met    3. Pt will be able to sit with equal weight on the LEs and perform sit to stand without UE support. [] Met [] Not met [] Partially met    4. TUG score <20 seconds without a device to reduce risk for falls.  [] Met [] Not met [] Partially met    TODAY'S TREATMENT: EVALUATION completed   Visit #: 1  In time:1: 10pm  Out time:2:05pm  Total Treatment Time (min): 55   Total Timed Codes (min): 20 1:1 Treatment Time (MC only): 20   Pain Level (0-10 scale) pre treatment: 3   Pain Level (0-10 scale) post treatment: 2  Modality rationale: decrease inflammation and decrease pain to improve the patients ability to move the L knee without pain    Min Type Additional Details    [] Estim: []UnAtt   []Att       []TENS instruct                  []IFC  []Premod   []NMES []w/US   []w/ice   []w/heat  Position:                                     Location:   15 [x]  Ice     []  Heat  []  Ice massage Position: supine with LEs elevated   Location: L knee     []  Traction: [] Cervical       []Lumbar                       []Intermittent   []Continuous                     Lbs:  []W/heat               []W/heat and Estim    []  Ultrasound: []Continuous   [] Pulsed at:                           []1MHz   []3MHz Location:  W/cm2:    [x] Skin assessment post-treatment:  [x]intact        []redness- no adverse reaction     []redness - adverse reaction:   20 min Therapeutic Exercise:  [x] See flow sheet :   Rationale: increase ROM and increase strength to improve the patients ability to move around without L knee pain   With   [x] TE   [] TA   [] Neuro   [] SC   [] other: Patient Education: [x] Review HEP    [] Progressed/Changed HEP based on:   [x] positioning   [] body mechanics   [] transfers   [x] heat/ice application    [x] other: discussed POC and home care tips to assist in swelling management        [x]  Plan of care has been reviewed with PTA. The Plan of Care is based on information from the initial evaluation. Krys Mariano, PT, DPT 3/21/2022   ________________________________________________________________________    I certify that the above Therapy Services are being furnished while the patient is under my care. I agree with the treatment plan and certify that this therapy is necessary.     Physician's Signature:_________________________________________________  Date:____________Time: ____________     Maye Gudino MD

## 2022-03-24 ENCOUNTER — OFFICE VISIT (OUTPATIENT)
Dept: ORTHOPEDIC SURGERY | Age: 82
End: 2022-03-24
Payer: MEDICARE

## 2022-03-24 DIAGNOSIS — Z96.652 STATUS POST LEFT KNEE REPLACEMENT: Primary | ICD-10-CM

## 2022-03-24 DIAGNOSIS — F11.99 OPIOID USE, UNSPECIFIED WITH UNSPECIFIED OPIOID-INDUCED DISORDER (HCC): ICD-10-CM

## 2022-03-24 PROBLEM — M17.9 OA (OSTEOARTHRITIS) OF KNEE: Status: ACTIVE | Noted: 2022-03-17

## 2022-03-24 PROCEDURE — 99024 POSTOP FOLLOW-UP VISIT: CPT | Performed by: NURSE PRACTITIONER

## 2022-03-24 RX ORDER — OXYCODONE AND ACETAMINOPHEN 5; 325 MG/1; MG/1
1 TABLET ORAL
Qty: 30 TABLET | Refills: 0 | Status: SHIPPED | OUTPATIENT
Start: 2022-03-24 | End: 2022-04-05

## 2022-03-24 NOTE — PROGRESS NOTES
Subjective:      Patient presents for postop care following left TKA. Surgery was on 3/17/2022. Ambulating independently. Pain is controlled with current analgesics. Medication(s) being used: Percocet. .    Objective: There were no vitals taken for this visit. General:  alert, cooperative, no distress, appears stated age   ROM: -5/95; good quad strength on extension   Incision:   healing well, no drainage, no erythema, incision well approximated, mild swelling     Assessment:     Doing well postoperatively. Plan:     1. Continue PT. 2. Wound care/showering discussed. 3. Continue DVT prophylaxis as directed. 4. Follow up at 1 yr with Dr. Josh Flores for xray's of the left knee and as needed. Sobia Gip

## 2022-03-25 ENCOUNTER — HOSPITAL ENCOUNTER (OUTPATIENT)
Dept: PHYSICAL THERAPY | Age: 82
Discharge: HOME | End: 2022-03-25
Payer: MEDICARE

## 2022-03-25 PROCEDURE — 97110 THERAPEUTIC EXERCISES: CPT

## 2022-03-25 PROCEDURE — 97140 MANUAL THERAPY 1/> REGIONS: CPT

## 2022-03-25 NOTE — PROGRESS NOTES
PT DAILY TREATMENT NOTE     Patient Name: Monalisa Alonzo  Date:3/25/2022  : 1940  [x]  Patient  Verified  Payor: [place] MEDICARE / Plan: VA MEDICARE PART A & B / Product Type: Medicare /    Treatment Area: Pain in left knee [M25.562]  Unilateral primary osteoarthritis, left knee [M17.12]   Next MD APPT: In a year   In time:1:45   Out time:2:40    Total Treatment Time (min): 50  Total Timed Codes (min): 40  1:1 Treatment Time (MC only): 40  Visit #: -     SUBJECTIVE    Any medication changes, allergies to medications, adverse drug reactions, diagnosis change, or new procedure performed?:   [x] No    [] Yes (see summary sheet for update)    Pain Level (0-10 scale) pre treatment: 3-4/10                    Pain Level (0-10 scale) post treatment: 2/10 and stiffness     Subjective functional status/changes:   [] No changes reported  Patient reports feeling dizziness and lightheadness today. Stated he took his  BP meds today as usual, checked his sugar and eat but been feeling ligthheadness all morning today, stated  it comes and goes and he drove himself to therapy and does not feel likd he should have came. Reports some knee pain , he iced but has not done much of the exercises. He went to the doctor yesterday and they took the bandages. The knee looks swollen and slgihtly red. Patient arrived without a walker. Presented with mild antalgic gait pattern. OBJECTIVE    Modality rationale: decrease edema, decrease inflammation, decrease pain, increase tissue extensibility and increase muscle contraction/control to improve the patients ability to reduce knee pain with mobility.     Min Type Additional Details    [] Estim: []UnAtt   []Att       []TENS instruct                  []IFC  []Premod   []NMES                     []Other:  []w/US   []w/ice   []w/heat  Position:  Location:   10 [x]  Ice     []  Heat  []  Ice massage Position:supine with wedge   Location:(L) knee     []  Traction: [] Cervical []Lumbar                       [] Prone          []Supine                       []Intermittent   []Continuous Lbs:  []w/heat  []W/heat and Estim    []  Ultrasound: []Continuous   [] Pulsed at:                           []1MHz   []3MHz Location:  W/cm2:      [] Skin assessment post-treatment:   []intact  []redness- no adverse reaction   []redness - adverse reaction:     35 min Therapeutic Exercise:  [] See flow sheet :   Rationale: increase ROM, increase strength, improve coordination, improve balance and increase proprioception to improve the patients ability to reduce pain with ambulation and return to his PLOF. 10 min Manual Therapy: effleurage  Massage, STM and patella mobs    Rationale: decrease pain, increase ROM, increase tissue extensibility and decrease edema  to improve the patients ability to reduce knee pain with ambulation. With   [x] TE   [] TA   [] Neuro   [] SC   [] other: Patient Education: [x] Review HEP    [] Progressed/Changed HEP based on:   [] positioning   [] body mechanics   [] transfers   [] Use of heat/ice    [] other:            Other Objective/Functional Measures:   BP in sitting 120/74mmHG  HR 80 bpm at initiation of session. POC was initiated we performed mostly supine the-ex - reports no discomfort or dizziness  L knee flexion AROM 121 deg   L knee extension  AROM (-5)  deg     Post ice   BP in supine was 111/74 mmhg HR 83  BP in sitting 91/61 mmhg HR 92 bpm patient reports some dizziness after 2 min of seated there-ex   BP increased to 92/65 mmhg and patient reports feeling slightly better. BP in standing 98/69 reports very mild dizziness. ASSESSMENT/Changes in Function:   Patient tolerated session fairly well, able to do all supine there-ex with no adverse reaction. He reported no dizziness while lying down in bed and vitals were monitored upon sitting/standing.  Patient was educated about the importance of exercising and doing his patella mobs, even though  knee cap appeared in good mobility and loose. We noted more ITB and hamstring tightness during STM. Patient encouraged to use walker specially in days he does not feel well. Patient will continue to benefit from skilled PT services to modify and progress therapeutic interventions, address functional mobility deficits, address ROM deficits, address strength deficits, analyze and address soft tissue restrictions, analyze and cue movement patterns and analyze and modify body mechanics/ergonomics to attain remaining goals. GOALS/Progress towards goals:  Short Term Goals: To be accomplished in 8-12 treatments. 1.  Indep with a HEP to assist in rehab progression. []? Met []? Not met []? Partially met    2. L knee extension to 0 degrees to improve the ability to straighten the L knee. []? Met []? Not met []? Partially met    3. Improvement in L quad strength, Pt will be able to fully extend his knee in seated position. []? Met []? Not met []? Partially met    4. 1-2 cm improvement in swelling to improve mobility of the L knee. []? Met []? Not met []? Partially met      Long Term Goals: To be accomplished in 16-24 treatments. 1.  Pt will be able to ambulate independently without gait deviations or increase in pain. []? Met []? Not met []? Partially met   2. Normalized stair climb with use of railing. []? Met []? Not met []? Partially met    3. Pt will be able to sit with equal weight on the LEs and perform sit to stand without UE support. []? Met []? Not met []? Partially met    4. TUG score <20 seconds without a device to reduce risk for falls. []? Met []? Not met []?  Partially     PLAN  []  Continue plan of care  []  Upgrade activities as tolerated       []  Update interventions per flow sheet       []  Discharge due to:  []  Other:     Briana Saint, PT, DPT 3/25/2022

## 2022-03-28 ENCOUNTER — APPOINTMENT (OUTPATIENT)
Dept: PHYSICAL THERAPY | Age: 82
End: 2022-03-28
Payer: MEDICARE

## 2022-04-01 ENCOUNTER — HOSPITAL ENCOUNTER (OUTPATIENT)
Dept: PHYSICAL THERAPY | Age: 82
Discharge: HOME | End: 2022-04-01
Payer: MEDICARE

## 2022-04-01 PROCEDURE — 97110 THERAPEUTIC EXERCISES: CPT

## 2022-04-01 PROCEDURE — 97140 MANUAL THERAPY 1/> REGIONS: CPT

## 2022-04-01 NOTE — PROGRESS NOTES
PT DAILY TREATMENT NOTE     Patient Name: Katia Lynne  [id number]  : 1940  [x]  Patient  Verified  Payor: [place] MEDICARE / Plan: [place] MEDICARE PART A & B / Product Type: Medicare /    Treatment Area: Pain in left knee [M25.562]  Unilateral primary osteoarthritis, left knee [M17.12]   Next MD APPT: In a year   In time:102pm   Out time: 0140pm   Total Treatment Time (min): 38  Total Timed Codes (min): 38  1:1 Treatment Time (MC only): 45  Visit #: 3/16-     SUBJECTIVE    Any medication changes, allergies to medications, adverse drug reactions, diagnosis change, or new procedure performed?:   [x] No    [] Yes (see summary sheet for update)    Pain Level (0-10 scale) pre treatment: 3/10                    Pain Level (0-10 scale) post treatment: 2/10     Subjective functional status/changes:   [] No changes reported   *[place] but does better with the R ear. Pt is feeling better today, no dizziness or lightheadedness. He reports moderate pain. States he does not do his exercises all the time like he is supposed to. Pt has a cane but he is walking without it. OBJECTIVE    Modality rationale: decrease edema, decrease inflammation, decrease pain, increase tissue extensibility and increase muscle contraction/control to improve the patients ability to reduce knee pain with mobility.     Min Type Additional Details    [] Estim: []UnAtt   []Att       []TENS instruct                  []IFC  []Premod   []NMES                     []Other:  []w/US   []w/ice   []w/heat  Position:  Location:    [x]  Ice     []  Heat  []  Ice massage Position:supine with wedge   Location:(L) knee     []  Traction: [] Cervical       []Lumbar                       [] Prone          []Supine                       []Intermittent   []Continuous Lbs:  []w/heat  []W/heat and Estim    []  Ultrasound: []Continuous   [] Pulsed at:                           []1MHz   []3MHz Location:  W/cm2:    *Pt states he will do ice at home*  [] Skin assessment post-treatment:   []intact  []redness- no adverse reaction   []redness - adverse reaction:     28 min Therapeutic Exercise:  [] See flow sheet :   Rationale: increase ROM, increase strength, improve coordination, improve balance and increase proprioception to improve the patients ability to reduce pain with ambulation and return to his PLOF. 10 min Manual Therapy: joint mobs, patellar mobs, passive ROM with overpressure   Rationale: decrease pain, increase ROM, increase tissue extensibility and decrease edema  to improve the patients ability to reduce knee pain with ambulation. With   [x] TE   [] TA   [] Neuro   [] SC   [] other: Patient Education: [x] Review HEP    [] Progressed/Changed HEP based on:   [] positioning   [] body mechanics   [] transfers   [] Use of heat/ice    [] other:            Other Objective/Functional Measures:     L knee flexion AROM 121 deg   L knee extension  AROM (-5)  deg     ASSESSMENT/Changes in Function:   Patient with minimal knee pain throughout session. Pt still limited with knee extension but note hamstring tightness and limitations bilaterally. Pt is not compliant with HEP but performs exercises well in clinic. Quad contraction slightly weaker on L with slight extensor lag with SLR. Ambulating without AD. Patient will continue to benefit from skilled PT services to modify and progress therapeutic interventions, address functional mobility deficits, address ROM deficits, address strength deficits, analyze and address soft tissue restrictions, analyze and cue movement patterns and analyze and modify body mechanics/ergonomics to attain remaining goals. GOALS/Progress towards goals:  Short Term Goals: To be accomplished in 8-12 treatments. 1.  Indep with a HEP to assist in rehab progression. []? Met [x]? Not met []? Partially met   4/1/22 fair compliance  2. L knee extension to 0 degrees to improve the ability to straighten the L knee. []? Met []?  Not met []? Partially met    3. Improvement in L quad strength, Pt will be able to fully extend his knee in seated position. []? Met []? Not met []? Partially met    4. 1-2 cm improvement in swelling to improve mobility of the L knee. []? Met []? Not met []? Partially met      Long Term Goals: To be accomplished in 16-24 treatments. 1.  Pt will be able to ambulate independently without gait deviations or increase in pain. []? Met []? Not met []? Partially met   2. Normalized stair climb with use of railing. []? Met []? Not met []? Partially met    3. Pt will be able to sit with equal weight on the LEs and perform sit to stand without UE support. []? Met []? Not met []? Partially met    4. TUG score <20 seconds without a device to reduce risk for falls. []? Met []? Not met []?  Partially     PLAN  [x]  Continue plan of care  []  Upgrade activities as tolerated       [x]  Update interventions per flow sheet       []  Discharge due to:  []  Other:     Shakila Blake, PT, DPT 4/1/2022

## 2022-04-04 ENCOUNTER — PATIENT OUTREACH (OUTPATIENT)
Dept: CASE MANAGEMENT | Age: 82
End: 2022-04-04

## 2022-04-04 ENCOUNTER — HOSPITAL ENCOUNTER (OUTPATIENT)
Dept: PHYSICAL THERAPY | Age: 82
Discharge: HOME | End: 2022-04-04
Payer: MEDICARE

## 2022-04-04 PROCEDURE — 97110 THERAPEUTIC EXERCISES: CPT

## 2022-04-04 NOTE — PROGRESS NOTES
PT DAILY TREATMENT NOTE     Patient Name: Isabella Montes  Date:2022  : 1940  [x]  Patient  Verified  Payor: VA MEDICARE / Plan: VA MEDICARE PART A & B / Product Type: Medicare /    Treatment Area: Pain in left knee [M25.562]  Unilateral primary osteoarthritis, left knee [M17.12]   Next MD APPT: In a year   In time:1:03pm   Out time: 1:55pm  Total Treatment Time (min): 52  Total Timed Codes (min): 42  1:1 Treatment Time (MC only): 42  Visit #: -     SUBJECTIVE    Any medication changes, allergies to medications, adverse drug reactions, diagnosis change, or new procedure performed?:   [x] No    [] Yes (see summary sheet for update)    Pain Level (0-10 scale) pre treatment: 0/10                    Pain Level (0-10 scale) post treatment: 0/10     Subjective functional status/changes:   [] No changes reported   *[place] but does better with the R ear. No new complaints with knee but does have some LBP. Pt states he thinks he pulled a muscle in his back over the weekend and is unsure how much he will be able to do today,     OBJECTIVE    Modality rationale: decrease edema, decrease inflammation, decrease pain, increase tissue extensibility and increase muscle contraction/control to improve the patients ability to reduce knee pain with mobility.     Min Type Additional Details    [] Estim: []UnAtt   []Att       []TENS instruct                  []IFC  []Premod   []NMES                     []Other:  []w/US   []w/ice   []w/heat  Position:  Location:   10 [x]  Ice     []  Heat  []  Ice massage Position:supine with wedge   Location:(L) knee     []  Traction: [] Cervical       []Lumbar                       [] Prone          []Supine                       []Intermittent   []Continuous Lbs:  []w/heat  []W/heat and Estim    []  Ultrasound: []Continuous   [] Pulsed at:                           []1MHz   []3MHz Location:  W/cm2:    *Pt states he will do ice at home*  [] Skin assessment post-treatment: []intact  []redness- no adverse reaction   []redness - adverse reaction:     40 min Therapeutic Exercise:  [x] See flow sheet :   Rationale: increase ROM, increase strength, improve coordination, improve balance and increase proprioception to improve the patients ability to reduce pain with ambulation and return to his PLOF. 2 min Manual Therapy: joint mobs for extension    Rationale: decrease pain, increase ROM, increase tissue extensibility and decrease edema  to improve the patients ability to reduce knee pain with ambulation. With   [x] TE   [] TA   [] Neuro   [] SC   [] other: Patient Education: [x] Review HEP    [] Progressed/Changed HEP based on:   [] positioning   [] body mechanics   [] transfers   [] Use of heat/ice    [] other:            Other Objective/Functional Measures:   Continued previous exercises adding fitter. Attempted to do standing hip exercises but pt was limited by back pain. ASSESSMENT/Changes in Function:   Pt is progressing well with the L knee but was limited this session due to LBP. Cues to encourage pt to work on L knee extension as he often keeps the knee flexed. He has good passive extension. Some swelling still noted in the L knee but this is not limiting ROM at this time. Will continue to progress as tolerated. Patient will continue to benefit from skilled PT services to modify and progress therapeutic interventions, address functional mobility deficits, address ROM deficits, address strength deficits, analyze and address soft tissue restrictions, analyze and cue movement patterns and analyze and modify body mechanics/ergonomics to attain remaining goals. GOALS/Progress towards goals:  Short Term Goals: To be accomplished in 8-12 treatments. 1.  Indep with a HEP to assist in rehab progression. []? Met [x]? Not met []? Partially met   4/1/22 fair compliance  2. L knee extension to 0 degrees to improve the ability to straighten the L knee. []? Met []?  Not met []? Partially met    3. Improvement in L quad strength, Pt will be able to fully extend his knee in seated position. []? Met []? Not met []? Partially met    4. 1-2 cm improvement in swelling to improve mobility of the L knee. []? Met []? Not met []? Partially met      Long Term Goals: To be accomplished in 16-24 treatments. 1.  Pt will be able to ambulate independently without gait deviations or increase in pain. []? Met []? Not met []? Partially met   2. Normalized stair climb with use of railing. []? Met []? Not met []? Partially met    3. Pt will be able to sit with equal weight on the LEs and perform sit to stand without UE support. []? Met []? Not met []? Partially met    4. TUG score <20 seconds without a device to reduce risk for falls. []? Met []? Not met []?  Partially     PLAN  [x]  Continue plan of care  [x]  Upgrade activities as tolerated       [x]  Update interventions per flow sheet       []  Discharge due to:  []  Other:     Anita Cao, PT, DPT 4/4/2022

## 2022-04-04 NOTE — PROGRESS NOTES
Care Transitions Follow Up Call    Challenges to be reviewed by the provider   Additional needs identified to be addressed with provider: no  none           Method of communication with provider : none    Care Transition Nurse (CTN) contacted the patient by telephone to follow up after admission on 3/17/2022 to 3/18/2022. Verified name and  with patient as identifiers. Addressed changes since last contact: none  Follow up appointment completed? yes. Was follow up appointment scheduled within 7 days of discharge? yes. Advance Care Planning:   Does patient have an Advance Directive:  yes; reviewed and current     CTN reviewed discharge instructions, medical action plan and red flags with patient and discussed any barriers to care and/or understanding of plan of care after discharge. Discussed appropriate site of care based on symptoms and resources available to patient including: PCP, Specialist, [address] [place] [address] and [address] [place] [address]. The patient agrees to contact the PCP office for questions related to their healthcare.      Patients top risk factors for readmission: functional physical ability, medical condition-left total knee replacement and support system   Interventions to address risk factors: Scheduled appointment with PCP-left knee arthoplasty    REHABILITATION [facility] follow up appointment(s):   Future Appointments   Date Time Provider Kiran Montoya   2022 10:00 AM Nolan Franco MD [facility] AMB   2022  2:15 PM SPEC CDE LAB CDE BS AMB   2022  1:00 PM Angeline Hopkins PT, DPT [facility]   2022  1:00 PM Angeline Hopkins PT, DPT [facility]   2022  1:45 PM Meka Comfort, [facility]   2022  2:30 PM Lisa Robles MD CDE BS AMB   2022  1:00 PM Meka Comfort, [facility]   4/15/2022  1:00 PM Meka Comfort, [facility]   2022  1:45 PM Chery Quiñonez Sharlene, PT, DPT [facility]   2022  1:45 PM Chery Quiñonez Sharlene, PT, DPT [facility] 4/22/2022  1:00 PM Chery Quiñonez Sharlene, PT, DPT [facility]   4/25/2022  1:00 PM Meka Vidales, [facility]   4/27/2022  1:00 PM Meka Vidales, PTA [facility]   4/29/2022  1:00 PM Chery Quiñonez Sharlene, PT, DPT [facility]   3/28/2023 10:50 AM Dana Roman MD [facility] BS [facility]     Non-BS follow up appointment(s): n/a    CTN provided contact information for future needs. Plan for follow-up call in 5-7 days based on severity of symptoms and risk factors. Plan for next call: medication management-ensure patient has meds, knows when to take them and knows when to call physician for any issues. Goals Addressed                 This Visit's Progress     Prevent complications post hospitalization. 1. CTN will monitor X 4 weeks    2. Ensure provider appt is scheduled within 7 days post-discharge 3/21/2022 patient sees ortho NP 3/24/2022 at 11:45 am and sees PCP 4/5/2022 at 10 am    3. Confirm patient attended post-discharge provider apt 4/4/2022 Patient saw ortho surgeon- doing well. 4. Complete post-visit call to confirm attendance and update care needs  3/21/2022 patient stated that he is doing well. He is having some pain, but he is up and moving about. Encouraged patient to sit with leg elevated and use ice several times per day. 4/4/2022 Patient has stopped using pain meds. He is feeling good. No care needs noted. 5. Review/educate common or potential \"red flags\" of condition worsening 3/21/2022 Reviewed signs/symptoms of knee replacement such as infection, stiffness, pain, implant issues- allergy or malfunction of device to name a few. 6. Evaluate adherence to medications and priority barriers to resolve  3/21/2022 Patient stated that he has all meds and is taking as directed. 4/4/2022 Patient continues to have all meds and takes as prescribed.        7. Instruct on adherence to medications as ordered and assess for therapeutic response and side-effects  3/21/2022 patient stated that he is aware of why he takes meds and knows when to call physician for issues. 4/4/2022 Patient stated that he has no concerns about meds. 8. Discuss and evaluate ADL performance. Provide recommendations on energy conservation, particularly related to transition home from an inpatient admission. 3/21/2022 Patient stated that he is up moving about. Encouraged patient to use walker for extra support. He begins PT today. When he rests- he is elevating and using ice. 4/4/2022 Patient continues to do his exercises. He is able to more and more. Healing nicely.

## 2022-04-05 ENCOUNTER — OFFICE VISIT (OUTPATIENT)
Dept: FAMILY MEDICINE CLINIC | Age: 82
End: 2022-04-05
Payer: MEDICARE

## 2022-04-05 VITALS
WEIGHT: 181 LBS | HEART RATE: 68 BPM | TEMPERATURE: 97.3 F | OXYGEN SATURATION: 98 % | RESPIRATION RATE: 16 BRPM | BODY MASS INDEX: 28.41 KG/M2 | DIASTOLIC BLOOD PRESSURE: 53 MMHG | SYSTOLIC BLOOD PRESSURE: 110 MMHG | HEIGHT: 67 IN

## 2022-04-05 DIAGNOSIS — I10 ESSENTIAL HYPERTENSION: ICD-10-CM

## 2022-04-05 DIAGNOSIS — E78.2 MIXED HYPERLIPIDEMIA: ICD-10-CM

## 2022-04-05 DIAGNOSIS — E55.9 VITAMIN D DEFICIENCY: ICD-10-CM

## 2022-04-05 DIAGNOSIS — N18.31 CHRONIC KIDNEY DISEASE, STAGE 3A (HCC): ICD-10-CM

## 2022-04-05 DIAGNOSIS — E11.65 TYPE 2 DIABETES MELLITUS WITH HYPERGLYCEMIA, WITHOUT LONG-TERM CURRENT USE OF INSULIN (HCC): Primary | ICD-10-CM

## 2022-04-05 DIAGNOSIS — I25.10 CORONARY ARTERIOSCLEROSIS: ICD-10-CM

## 2022-04-05 PROCEDURE — G8510 SCR DEP NEG, NO PLAN REQD: HCPCS | Performed by: STUDENT IN AN ORGANIZED HEALTH CARE EDUCATION/TRAINING PROGRAM

## 2022-04-05 PROCEDURE — 1101F PT FALLS ASSESS-DOCD LE1/YR: CPT | Performed by: STUDENT IN AN ORGANIZED HEALTH CARE EDUCATION/TRAINING PROGRAM

## 2022-04-05 PROCEDURE — 99214 OFFICE O/P EST MOD 30 MIN: CPT | Performed by: STUDENT IN AN ORGANIZED HEALTH CARE EDUCATION/TRAINING PROGRAM

## 2022-04-05 PROCEDURE — G8536 NO DOC ELDER MAL SCRN: HCPCS | Performed by: STUDENT IN AN ORGANIZED HEALTH CARE EDUCATION/TRAINING PROGRAM

## 2022-04-05 PROCEDURE — G8752 SYS BP LESS 140: HCPCS | Performed by: STUDENT IN AN ORGANIZED HEALTH CARE EDUCATION/TRAINING PROGRAM

## 2022-04-05 PROCEDURE — G8427 DOCREV CUR MEDS BY ELIG CLIN: HCPCS | Performed by: STUDENT IN AN ORGANIZED HEALTH CARE EDUCATION/TRAINING PROGRAM

## 2022-04-05 PROCEDURE — G8754 DIAS BP LESS 90: HCPCS | Performed by: STUDENT IN AN ORGANIZED HEALTH CARE EDUCATION/TRAINING PROGRAM

## 2022-04-05 PROCEDURE — G8417 CALC BMI ABV UP PARAM F/U: HCPCS | Performed by: STUDENT IN AN ORGANIZED HEALTH CARE EDUCATION/TRAINING PROGRAM

## 2022-04-05 NOTE — PROGRESS NOTES
Subjective:     Chief Complaint   Patient presents with    Follow-up     HPI:  Juana Swenson is a 78 y.o. male who presents for follow-up of chronic conditions. He is not fasting today. DMhistory of diabetes. Most recent A1c was 7.9. Follows with endocrinology. He is on Metformin nightly Lantus. CADfollows with cardiology. Stress test in 2019 was negative. Cath in 2013 showed LAD 40% narrowing 40 to 50% narrowing left circumflex RCA diffusely diseased. Left knee arthritisrecently had left knee replacement is doing well. He is off all pain medication. HTNfollows with cardiology. Well-controlled. Overdue to have lipids checked. But he is not fasting today.   Lab Results   Component Value Date/Time    Cholesterol, total 190 03/16/2021 01:54 PM    HDL Cholesterol 31 (L) 03/16/2021 01:54 PM    LDL,Direct 118 (H) 03/16/2020 01:39 PM    LDL-C, External 67 07/23/2019 12:00 AM    LDL, calculated 107 (H) 03/16/2021 01:54 PM    VLDL, calculated 52 (H) 03/16/2021 01:54 PM    Triglyceride 304 (H) 03/16/2021 01:54 PM     Patient Active Problem List    Diagnosis    Opioid use, unspecified with unspecified opioid-induced disorder    OA (osteoarthritis) of knee    S/P total knee replacement, left    Osteoarthritis of left knee     Follows with orthopedic surgery      Polyneuropathy, unspecified    Hereditary and idiopathic neuropathy, unspecified    Vitamin D deficiency    Chronic kidney disease, stage 3 (HCC)     stage 3a mild to moderate loss of kidney function      Type 2 diabetes mellitus with hyperglycemia, without long-term current use of insulin (HCC)    Essential hypertension    Mixed hyperlipidemia    Coronary arteriosclerosis     Past Medical History:   Diagnosis Date    Cataract     Chronic kidney disease, stage 3 ([address] 7/24/2019    stage 3a mild to moderate loss of kidney function    Coronary arteriosclerosis 6/29/2017    Coronary artery disease     Hyperlipidemia  Hypertension     Neuropathy     Type 2 diabetes mellitus (HCC)     Vitamin D deficiency      Family History   Problem Relation Age of Onset    Cancer Mother         stomach    Heart Disease Father     Hypertension Father     Cancer Brother         brain tumor      reports that he quit smoking about 39 years ago. His smoking use included cigarettes. He has a 40.00 pack-year smoking history. He has never used smokeless tobacco. He reports that he does not drink alcohol and does not use drugs. Current Outpatient Medications on File Prior to Visit   Medication Sig Dispense Refill    metoprolol succinate (TOPROL-XL) 100 mg tablet       ondansetron (ZOFRAN ODT) 4 mg disintegrating tablet Take 1 Tablet by mouth every eight (8) hours as needed for Nausea, Vomiting or Nausea or Vomiting for up to 20 doses. 20 Tablet 0    metFORMIN ER (GLUCOPHAGE XR) 500 mg tablet Take 2 Tablets by mouth two (2) times a day. 360 Tablet 3    insulin glargine (LANTUS,BASAGLAR) 100 unit/mL (3 mL) inpn 24 Units by SubCUTAneous route nightly 15 mL 1    insulin glargine (Lantus Solostar U-100 Insulin) 100 unit/mL (3 mL) inpn 24 Units by SubCUTAneous route nightly. 30 mL 3    lisinopril-hydroCHLOROthiazide (PRINZIDE, ZESTORETIC) 20-12.5 mg per tablet TAKE 1 TABLET BY MOUTH DAILY 90 Tablet 3    ezetimibe (ZETIA) 10 mg tablet TAKE 1 TABLET BY MOUTH DAILY 90 Tablet 1    cyanocobalamin (VITAMIN B12) 500 mcg tablet Take 500 mcg by mouth daily.  DULoxetine (CYMBALTA) 20 mg capsule TAKE 1 CAPSULE BY MOUTH EVERY DAY 90 Cap 0    ergocalciferol (VITAMIN D2) 50,000 unit capsule Take 5,000 Units by mouth daily.  [DISCONTINUED] oxyCODONE-acetaminophen (Percocet) 5-325 mg per tablet Take 1 Tablet by mouth every eight (8) hours as needed for Pain for up to 14 days. Max Daily Amount: 3 Tablets.  (Patient not taking: Reported on 4/5/2022) 30 Tablet 0    Accu-Chek Patricia Plus test strp strip USE TO TEST BLOOD SUGAR TWICE DAILY (Patient not taking: Reported on 4/5/2022) 100 Strip 5    lancets misc USE TO TEST BLOOD SUGAR TWICE DAILY Dx Code: E11.65 (Patient not taking: Reported on 4/5/2022) 200 Each 3    Insulin Needles, Disposable, (ALICE PEN NEEDLE) 32 gauge x 5/32\" ndle Use to inject Lantus nightly. DX Code: E11.65 (Patient not taking: Reported on 4/5/2022) 100 Pen Needle 3    [DISCONTINUED] Blood-Glucose Meter monitoring kit Test BID Dx Code: E11.65 (Patient not taking: Reported on 10/7/2021) 1 Kit 0     No current facility-administered medications on file prior to visit. No Known Allergies  Review of Systems   All other systems reviewed and are negative. Objective:     Vitals:    04/05/22 0956   BP: (!) 110/53   Pulse: 68   Resp: 16   Temp: 97.3 °F (36.3 °C)   TempSrc: Axillary   SpO2: 98%   Weight: 181 lb (82.1 kg)   Height: 5' 7\" (1.702 m)     Physical Exam  Vitals reviewed. HENT:      Head: Normocephalic and atraumatic. Cardiovascular:      Rate and Rhythm: Normal rate and regular rhythm. Heart sounds: Normal heart sounds. Pulmonary:      Effort: Pulmonary effort is normal.      Breath sounds: Normal breath sounds. Musculoskeletal:      Comments: Left knee-healing surgical scar noted over left knee. Dressing C/D/I. Neurological:      Mental Status: He is oriented to person, place, and time. Psychiatric:         Behavior: Behavior normal.            Assessment/Plan:       ICD-10-CM ICD-9-CM    1. Type 2 diabetes mellitus with hyperglycemia, without long-term current use of insulin (HCC)  E11.65 250.00      790.29    2. Chronic kidney disease, stage 3a (HCC)  N18.31 585.3    3. Essential hypertension  I10 401.9    4. Coronary arteriosclerosis  I25.10 414.00    5. Vitamin D deficiency  E55.9 268.9    6. Mixed hyperlipidemia  E78.2 272.2      Continue current management for chronic conditions. He is overdue for lipids, but is not fasting today.   Offered to have him come in for a lab only visit for fasting labs with patient virtually. Lipids checked during his next visit. Continue following with cardiology and endocrinology. Recovering well and is in no pain since having left knee replacement. Continue Zetia    Follow-up and Dispositions    · Return in about 6 months (around 10/5/2022) for Wellness.           Rodriguez Haney MD

## 2022-04-05 NOTE — PROGRESS NOTES
Addended by: MONI SANDRA on: 8/24/2021 02:56 PM     Modules accepted: Orders     Chief Complaint   Patient presents with    Follow-up     Visit Vitals  BP (!) 110/53 (BP 1 Location: Left upper arm, BP Patient Position: Sitting)   Pulse 68   Temp 97.3 °F (36.3 °C) (Axillary)   Resp 16   Ht 5' 7\" (1.702 m)   Wt 181 lb (82.1 kg)   SpO2 98%   BMI 28.35 kg/m²     1. Have you been to the ER, urgent care clinic since your last visit? Hospitalized since your last visit? No    2. Have you seen or consulted any other health care providers outside of the [address] [place] [address] since your last visit? Include any pap smears or colon screening.  No

## 2022-04-06 ENCOUNTER — APPOINTMENT (OUTPATIENT)
Dept: PHYSICAL THERAPY | Age: 82
End: 2022-04-06
Payer: MEDICARE

## 2022-04-08 ENCOUNTER — HOSPITAL ENCOUNTER (OUTPATIENT)
Dept: PHYSICAL THERAPY | Age: 82
Discharge: HOME | End: 2022-04-08
Payer: MEDICARE

## 2022-04-08 PROCEDURE — 97110 THERAPEUTIC EXERCISES: CPT

## 2022-04-08 NOTE — PROGRESS NOTES
PT DAILY TREATMENT NOTE     Patient Name: Katia Lynne  Date:2022  : 1940  [x]  Patient  Verified  Payor: Jaquan Markham / Plan: [place] / Product Type: Medicare /    Treatment Area: Pain in left knee [M25.562]  Unilateral primary osteoarthritis, left knee [M17.12]   Next MD APPT: In a year   In time:1:02pm   Out time: 1:40pm  Total Treatment Time (min): 38  Total Timed Codes (min): 38  1:1 Treatment Time (MC only): 45  Visit #: -     SUBJECTIVE    Any medication changes, allergies to medications, adverse drug reactions, diagnosis change, or new procedure performed?:   [x] No    [] Yes (see summary sheet for update)    Pain Level (0-10 scale) pre treatment: 3-4/10                    Pain Level (0-10 scale) post treatment: 0/10     Subjective functional status/changes:   [] No changes reported   *[place] but does better with the R ear. Pt states his knee has been a little more sore this week, unsure of cause. Pt states the pain has been off and on. Has not needed to take anything for the pain. Is icing off and on. Pt is still getting the back pain chelsie in the morning, gets better as the day goes on. OBJECTIVE  - states he will ice at home   Modality rationale: decrease edema, decrease inflammation, decrease pain, increase tissue extensibility and increase muscle contraction/control to improve the patients ability to reduce knee pain with mobility.     Min Type Additional Details    [] Estim: []UnAtt   []Att       []TENS instruct                  []IFC  []Premod   []NMES                     []Other:  []w/US   []w/ice   []w/heat  Position:  Location:    [x]  Ice     []  Heat  []  Ice massage Position:supine with wedge   Location:(L) knee     []  Traction: [] Cervical       []Lumbar                       [] Prone          []Supine                       []Intermittent   []Continuous Lbs:  []w/heat  []W/heat and Estim    []  Ultrasound: []Continuous   [] Pulsed at: []1MHz   []3MHz Location:  W/cm2:    *Pt states he will do ice at home*  [] Skin assessment post-treatment:   []intact  []redness- no adverse reaction   []redness - adverse reaction:     38 min Therapeutic Exercise:  [x] See flow sheet :   Rationale: increase ROM, increase strength, improve coordination, improve balance and increase proprioception to improve the patients ability to reduce pain with ambulation and return to his PLOF. min Manual Therapy: joint mobs for extension    Rationale: decrease pain, increase ROM, increase tissue extensibility and decrease edema  to improve the patients ability to reduce knee pain with ambulation. With   [x] TE   [] TA   [] Neuro   [] SC   [] other: Patient Education: [x] Review HEP    [] Progressed/Changed HEP based on:   [] positioning   [] body mechanics   [] transfers   [] Use of heat/ice    [] other:            Other Objective/Functional Measures:   Modified exercises due to back pain and c/o increase soreness in the L knee     Girth - 38.5cm joint line; suprapatella 40cm   AROM- Knee flexion 125 deg, ext (-) 4    ASSESSMENT/Changes in Function:   Pt is progressing well at this time with improvement swelling and L knee ROM/strength. Activity tolerance is limited by LBP, unable to perform a lot of standing activities at this time. Recommended he continue to ice after exercises and end of day at home to help with soreness. Patient will continue to benefit from skilled PT services to modify and progress therapeutic interventions, address functional mobility deficits, address ROM deficits, address strength deficits, analyze and address soft tissue restrictions, analyze and cue movement patterns and analyze and modify body mechanics/ergonomics to attain remaining goals. GOALS/Progress towards goals:  Short Term Goals: To be accomplished in 8-12 treatments. 1.  Indep with a HEP to assist in rehab progression. [x]? Met []? Not met []?  Partially met 4/8  2. L knee extension to 0 degrees to improve the ability to straighten the L knee. []? Met []? Not met [x]? Partially met   4/8 progressing   3. Improvement in L quad strength, Pt will be able to fully extend his knee in seated position. []? Met []? Not met [x]? Partially met  4/8 progressing   4. 1-2 cm improvement in swelling to improve mobility of the L knee. [x]? Met []? Not met []? Partially met   4/8    Long Term Goals: To be accomplished in 16-24 treatments. 1.  Pt will be able to ambulate independently without gait deviations or increase in pain. []? Met []? Not met []? Partially met   2. Normalized stair climb with use of railing. []? Met []? Not met []? Partially met    3. Pt will be able to sit with equal weight on the LEs and perform sit to stand without UE support. []? Met []? Not met []? Partially met    4. TUG score <20 seconds without a device to reduce risk for falls. []? Met []? Not met []?  Partially     PLAN  [x]  Continue plan of care  [x]  Upgrade activities as tolerated       [x]  Update interventions per flow sheet       []  Discharge due to:  []  Other:     Skyla Paulino, PT, DPT 4/8/2022

## 2022-04-11 ENCOUNTER — PATIENT OUTREACH (OUTPATIENT)
Dept: CASE MANAGEMENT | Age: 82
End: 2022-04-11

## 2022-04-11 ENCOUNTER — HOSPITAL ENCOUNTER (OUTPATIENT)
Dept: PHYSICAL THERAPY | Age: 82
Discharge: HOME | End: 2022-04-11
Payer: MEDICARE

## 2022-04-11 PROCEDURE — 97110 THERAPEUTIC EXERCISES: CPT

## 2022-04-11 NOTE — PROGRESS NOTES
PT DAILY TREATMENT NOTE     Patient Name: Guerita Amador  Date:2022  : 1940  [x]  Patient  Verified  Payor: Curt Peaks / Plan: [place] MEDICARE PART A & B / Product Type: Medicare /    Treatment Area: Pain in left knee [M25.562]  Unilateral primary osteoarthritis, left knee [M17.12]   Next MD APPT: In a year   In time:1:45pm   Out time:2:30pm  Total Treatment Time (min): 45min  Total Timed Codes (min): 45 min  1:1 Treatment Time (MC only): 45 min  Visit #: -     SUBJECTIVE    Any medication changes, allergies to medications, adverse drug reactions, diagnosis change, or new procedure performed?:   [x] No    [] Yes (see summary sheet for update)    Pain Level (0-10 scale) pre treatment: 3/10   Pain Level (0-10 scale) post treatment: 0/10     Subjective functional status/changes:   [] No changes reported   *[place] but does better with the R ear. Pt wanted to know how long he had to come 3 x week. Patient stated he has to go up 6 steps to get into his house. OBJECTIVE  - states he will ice at home   Modality rationale: decrease edema, decrease inflammation, decrease pain, increase tissue extensibility and increase muscle contraction/control to improve the patients ability to reduce knee pain with mobility.     Min Type Additional Details    [] Estim: []UnAtt   []Att       []TENS instruct                  []IFC  []Premod   []NMES                     []Other:  []w/US   []w/ice   []w/heat  Position:  Location:    []  Ice     []  Heat  []  Ice massage Position:supine with wedge   Location:(L) knee     []  Traction: [] Cervical       []Lumbar                       [] Prone          []Supine                       []Intermittent   []Continuous Lbs:  []w/heat  []W/heat and Estim    []  Ultrasound: []Continuous   [] Pulsed at:                           []1MHz   []3MHz Location:  W/cm2:    *Pt states he will do ice at home*  [] Skin assessment post-treatment:   []intact  []redness- no adverse reaction []redness - adverse reaction:     45 min Therapeutic Exercise:  [x] See flow sheet :   Rationale: increase ROM, increase strength, improve coordination, improve balance and increase proprioception to improve the patients ability to reduce pain with ambulation and return to his PLOF. min Manual Therapy: joint mobs for extension    Rationale: decrease pain, increase ROM, increase tissue extensibility and decrease edema  to improve the patients ability to reduce knee pain with ambulation. With   [x] TE   [] TA   [] Neuro   [] SC   [] other: Patient Education: [x] Review HEP    [] Progressed/Changed HEP based on:   [] positioning   [] body mechanics   [] transfers   [] Use of heat/ice    [] other:            Other Objective/Functional Measures:   Increased exercises today and did more standing exercises . Added mini tramp, step ups , TKE ( YTB )   AROM- Knee flexion 125 deg, ext 0    ASSESSMENT/Changes in Function:   Patient was decreased to 2 times a week per PT Alan. He was able to tolerate new exercises today without any discomfort or increase in pain. Patient is currently ambulating without an AD. He will continue to ice at home. No pain post treatment session. Patient will continue to benefit from skilled PT services to modify and progress therapeutic interventions, address functional mobility deficits, address ROM deficits, address strength deficits, analyze and address soft tissue restrictions, analyze and cue movement patterns and analyze and modify body mechanics/ergonomics to attain remaining goals. GOALS/Progress towards goals:  Short Term Goals: To be accomplished in 8-12 treatments. 1.  Indep with a HEP to assist in rehab progression. [x]? Met []? Not met []? Partially met   4/8  2. L knee extension to 0 degrees to improve the ability to straighten the L knee. []? Met []? Not met [x]? Partially met   4/8 progressing   3.  Improvement in L quad strength, Pt will be able to fully extend his knee in seated position. []? Met []? Not met [x]? Partially met  4/8 progressing   4. 1-2 cm improvement in swelling to improve mobility of the L knee. [x]? Met []? Not met []? Partially met   4/8    Long Term Goals: To be accomplished in 16-24 treatments. 1.  Pt will be able to ambulate independently without gait deviations or increase in pain. [x]? Met []? Not met []? Partially met   2. Normalized stair climb with use of railing. []? Met []? Not met []? Partially met    3. Pt will be able to sit with equal weight on the LEs and perform sit to stand without UE support. []? Met []? Not met []? Partially met    4. TUG score <20 seconds without a device to reduce risk for falls. []? Met []? Not met []?  Partially     PLAN  [x]  Continue plan of care  [x]  Upgrade activities as tolerated       [x]  Update interventions per flow sheet       []  Discharge due to:  []  Other:     Berta Zacarias, PTA 4/11/2022

## 2022-04-11 NOTE — PROGRESS NOTES
Care Transitions Follow Up Call    Challenges to be reviewed by the provider   Additional needs identified to be addressed with provider: no  none           Method of communication with provider : none    Care Transition Nurse (CTN) contacted the patient by telephone to follow up after admission on 3/17/2022 to 3/18/2022. Verified name and  with patient as identifiers. Addressed changes since last contact: none  Follow up appointment completed? yes. Was follow up appointment scheduled within 7 days of discharge? yes. Advance Care Planning:   Does patient have an Advance Directive:  yes; reviewed and current     CTN reviewed discharge instructions, medical action plan and red flags with patient and discussed any barriers to care and/or understanding of plan of care after discharge. Discussed appropriate site of care based on symptoms and resources available to patient including: PCP, Specialist, [address] [place] [address] and [facility]. The patient agrees to contact the PCP office for questions related to their healthcare.      Patients top risk factors for readmission: functional physical ability, medical condition-lrft knee replacement and support system   Interventions to address risk factors: Scheduled appointment with PCP-[facility] follow up appointment(s):   Future Appointments   Date Time Provider Kiran Montoya   2022  1:45 PM Eliz Gajeannette [facility]   2022  2:30 PM Ignacio Sloan MD CDE [facility]   [facility]   4/15[facility]  [facility]   2022  1:45 PM Evaristo Quiñonez, PT, DPT [facility]   2022  1:45 PM Evaristo Quiñonez, PT, DPT [facility]   2022  1:00 PM Evaristo Quiñonez, PT, DPT [facility]   [facility]22  [facility] [place] [facility]   2022  1:00 PM [facility]   2022  1:00 PM Evaristo Quiñonez, PT, DPT [facility]   10/6/2022  2:30 PM Shannon Hernandez MD [facility] AMB   3/28/2023 10:50 AM Ana Eduardo MD [facility] AMB     Non-[facility] follow up appointment(s): n/a    CTN provided contact information for future needs. Plan for follow-up call in 5-7 days based on severity of symptoms and risk factors. Plan for next call: follow up appointment-encourage patient to attend all follow up apptointments. Goals Addressed                 This Visit's Progress     Prevent complications post hospitalization. 1. CTN will monitor X 4 weeks    2. Ensure provider appt is scheduled within 7 days post-discharge 3/21/2022 patient sees ortho NP 3/24/2022 at 11:45 am and sees PCP 4/5/2022 at 10 am    3. Confirm patient attended post-discharge provider apt 4/4/2022 Patient saw ortho surgeon- doing well. 4. Complete post-visit call to confirm attendance and update care needs  3/21/2022 patient stated that he is doing well. He is having some pain, but he is up and moving about. Encouraged patient to sit with leg elevated and use ice several times per day. 4/4/2022 Patient has stopped using pain meds. He is feeling good. No care needs noted. 4/11/2022 patient continues to do well. Getting stronger each day. No care needs. 5. Review/educate common or potential \"red flags\" of condition worsening 3/21/2022 Reviewed signs/symptoms of knee replacement such as infection, stiffness, pain, implant issues- allergy or malfunction of device to name a few. 6. Evaluate adherence to medications and priority barriers to resolve  3/21/2022 Patient stated that he has all meds and is taking as directed. 4/4/2022 Patient continues to have all meds and takes as prescribed. 4/11/2022 patient continues to have all meds and take as instructed.       7. Instruct on adherence to medications as ordered and assess for therapeutic response and side-effects  3/21/2022 patient stated that he is aware of why he takes meds and knows when to call physician for issues. 4/4/2022 Patient stated that he has no concerns about meds. 4/11/2022 No medication concerns noted. 8. Discuss and evaluate ADL performance. Provide recommendations on energy conservation, particularly related to transition home from an inpatient admission. 3/21/2022 Patient stated that he is up moving about. Encouraged patient to use walker for extra support. He begins PT today. When he rests- he is elevating and using ice. 4/4/2022 Patient continues to do his exercises. He is able to do more and more. Healing nicely. 4/11/2022 patient continues to do well. Able to do more and more activity without difficulty.

## 2022-04-12 ENCOUNTER — OFFICE VISIT (OUTPATIENT)
Dept: ENDOCRINOLOGY | Age: 82
End: 2022-04-12
Payer: MEDICARE

## 2022-04-12 VITALS
OXYGEN SATURATION: 93 % | SYSTOLIC BLOOD PRESSURE: 128 MMHG | HEART RATE: 74 BPM | HEIGHT: 67 IN | WEIGHT: 180.4 LBS | RESPIRATION RATE: 20 BRPM | DIASTOLIC BLOOD PRESSURE: 74 MMHG | TEMPERATURE: 98.5 F | BODY MASS INDEX: 28.31 KG/M2

## 2022-04-12 DIAGNOSIS — I10 ESSENTIAL HYPERTENSION: ICD-10-CM

## 2022-04-12 DIAGNOSIS — E11.65 TYPE 2 DIABETES MELLITUS WITH HYPERGLYCEMIA, WITHOUT LONG-TERM CURRENT USE OF INSULIN (HCC): Primary | ICD-10-CM

## 2022-04-12 DIAGNOSIS — G62.9 POLYNEUROPATHY, UNSPECIFIED: ICD-10-CM

## 2022-04-12 DIAGNOSIS — E11.65 TYPE 2 DIABETES MELLITUS WITH HYPERGLYCEMIA, WITHOUT LONG-TERM CURRENT USE OF INSULIN (HCC): ICD-10-CM

## 2022-04-12 PROCEDURE — G8510 SCR DEP NEG, NO PLAN REQD: HCPCS | Performed by: INTERNAL MEDICINE

## 2022-04-12 PROCEDURE — G8754 DIAS BP LESS 90: HCPCS | Performed by: INTERNAL MEDICINE

## 2022-04-12 PROCEDURE — 99214 OFFICE O/P EST MOD 30 MIN: CPT | Performed by: INTERNAL MEDICINE

## 2022-04-12 PROCEDURE — G8536 NO DOC ELDER MAL SCRN: HCPCS | Performed by: INTERNAL MEDICINE

## 2022-04-12 PROCEDURE — 3051F HG A1C>EQUAL 7.0%<8.0%: CPT | Performed by: INTERNAL MEDICINE

## 2022-04-12 PROCEDURE — G8752 SYS BP LESS 140: HCPCS | Performed by: INTERNAL MEDICINE

## 2022-04-12 PROCEDURE — G8427 DOCREV CUR MEDS BY ELIG CLIN: HCPCS | Performed by: INTERNAL MEDICINE

## 2022-04-12 PROCEDURE — G8417 CALC BMI ABV UP PARAM F/U: HCPCS | Performed by: INTERNAL MEDICINE

## 2022-04-12 PROCEDURE — 1101F PT FALLS ASSESS-DOCD LE1/YR: CPT | Performed by: INTERNAL MEDICINE

## 2022-04-12 RX ORDER — INSULIN GLARGINE 100 [IU]/ML
24 INJECTION, SOLUTION SUBCUTANEOUS
Qty: 30 ML | Refills: 3 | Status: SHIPPED | OUTPATIENT
Start: 2022-04-12

## 2022-04-13 ENCOUNTER — APPOINTMENT (OUTPATIENT)
Dept: PHYSICAL THERAPY | Age: 82
End: 2022-04-13
Payer: MEDICARE

## 2022-04-13 NOTE — PROGRESS NOTES
Cesar Cancino MD            Patient Information  Date:4/12/2022  Name : Ashley Lucas 78 y.o.     YOB: 1940               Chief Complaint   Patient presents with    Diabetes       History of Present Illness: Ashley Lucas is a 78 y.o. male here for follow-up of  Type 2 Diabetes Mellitus. Self-referred for evaluation management of type 2 diabetes mellitus. He was managed by Dr. Ramesh Zhong in the past  He is on metformin, lantus 20 units  No meter or logbook  Checking fasting blood glucose which is less than 120  Recent knee replacement    Prior history    Eating chocolate, ice cream at bedtime,  Diabetes was diagnosed in 2015, complains of tingling, pain in the feet. Evaluated by Dr. Kelly Clay: Has peripheral neuropathy, sciatic radiculopathy, left leg weakness  Qualified for extra benefits based on  disability benefits          Eats 3 meals a day,    Wt Readings from Last 3 Encounters:   04/12/22 180 lb 6.4 oz (81.8 kg)   04/05/22 181 lb (82.1 kg)   03/17/22 191 lb 12.8 oz (87 kg)       BP Readings from Last 3 Encounters:   04/12/22 128/74   04/05/22 (!) 110/53   03/18/22 (!) 132/58           Past Medical History:   Diagnosis Date    Cataract     Chronic kidney disease, stage 3 ([facility] [address] 7/24/2019    stage 3a mild to moderate loss of kidney function    Coronary arteriosclerosis 6/29/2017    Coronary artery disease     Hyperlipidemia     Hypertension     Neuropathy     Type 2 diabetes mellitus (HCC)     Vitamin D deficiency      Current Outpatient Medications   Medication Sig    metoprolol succinate (TOPROL-XL) 100 mg tablet Take 100 mg by mouth daily.  metFORMIN ER (GLUCOPHAGE XR) 500 mg tablet Take 2 Tablets by mouth two (2) times a day.     lisinopril-hydroCHLOROthiazide (PRINZIDE, ZESTORETIC) 20-12.5 mg per tablet TAKE 1 TABLET BY MOUTH DAILY    Accu-Chek Patricia Plus test strp strip USE TO TEST BLOOD SUGAR TWICE DAILY    ezetimibe (ZETIA) 10 mg tablet TAKE 1 TABLET BY MOUTH DAILY    lancets misc USE TO TEST BLOOD SUGAR TWICE DAILY Dx Code: E11.65    cyanocobalamin (VITAMIN B12) 500 mcg tablet Take 500 mcg by mouth daily.  DULoxetine (CYMBALTA) 20 mg capsule TAKE 1 CAPSULE BY MOUTH EVERY DAY    Insulin Needles, Disposable, (ALICE PEN NEEDLE) 32 gauge x 5/32\" ndle Use to inject Lantus nightly. DX Code: E11.65    ergocalciferol (VITAMIN D2) 50,000 unit capsule Take 5,000 Units by mouth every seven (7) days.  insulin glargine (Lantus Solostar U-100 Insulin) 100 unit/mL (3 mL) inpn 24 Units by SubCUTAneous route nightly.  ondansetron (ZOFRAN ODT) 4 mg disintegrating tablet Take 1 Tablet by mouth every eight (8) hours as needed for Nausea, Vomiting or Nausea or Vomiting for up to 20 doses. (Patient not taking: Reported on 4/12/2022)     No current facility-administered medications for this visit. No Known Allergies    Review of Systems: Per HPI    Physical Examination:   Blood pressure 128/74, pulse 74, temperature 98.5 °F (36.9 °C), temperature source Temporal, resp. rate 20, height 5' 7\" (1.702 m), weight 180 lb 6.4 oz (81.8 kg), SpO2 93 %. Estimated body mass index is 28.25 kg/m² as calculated from the following:    Height as of this encounter: 5' 7\" (1.702 m). -   Weight as of this encounter: 180 lb 6.4 oz (81.8 kg).   - General: pleasant, no distress, good eye contact  - HEENT: no pallor, no periorbital edema, EOMI  - Neck: supple,  - Cardiovascular: regular,  normal S1 and S2,  - Respiratory: clear to auscultation bilaterally  -   - Musculoskeletal: no proximal muscle weakness in upper or lower extremities  -   - Neurological: alert and oriented  - Psychiatric: normal mood and affect  - Skin: color, texture, turgor normal.     Diabetic foot exam: March 2019    Left Foot:   Visual Exam: callous - Great toe   Pulse DP: 1+ (weak)   Filament test: reduced sensation    Vibratory sensation: diminished      Right Foot:   Visual Exam: normal    Pulse DP: 1+ (weak)   Filament test: reduced sensation    Vibratory sensation: diminished  Diabetic Foot and Eye Exam HM Status   Topic Date Due    Eye Exam  Never done    Diabetic Foot Care  12/06/2022         Data Reviewed:       Lab Results   Component Value Date/Time    Hemoglobin A1c 7.1 (H) 04/05/2022 11:08 AM    Hemoglobin A1c 7.9 (H) 11/29/2021 12:00 AM    Hemoglobin A1c 7.6 (H) 07/16/2021 12:00 AM    Hemoglobin A1c, External 6.9 10/29/2019 12:00 AM    Hemoglobin A1c, External 6.9 10/28/2019 12:00 AM    Glucose 187 (H) 04/05/2022 11:08 AM    Glucose (POC) 216 (H) 03/18/2022 07:23 AM    Microalbumin/Creat ratio (mg/g creat) 5 04/05/2022 11:08 AM    Microalbumin,urine random 0.90 04/05/2022 11:08 AM    LDL,Direct 97 04/05/2022 11:08 AM    LDL, calculated 107 (H) 03/16/2021 01:54 PM    Creatinine 1.67 (H) 04/05/2022 11:08 AM      Lab Results   Component Value Date/Time    GFR est non-AA 39 (L) 04/05/2022 11:08 AM    GFR est AA 48 (L) 04/05/2022 11:08 AM    Creatinine 1.67 (H) 04/05/2022 11:08 AM    BUN 25 (H) 04/05/2022 11:08 AM    Sodium 140 04/05/2022 11:08 AM    Potassium 4.4 04/05/2022 11:08 AM    Chloride 111 (H) 04/05/2022 11:08 AM    CO2 24 04/05/2022 11:08 AM       Assessment/Plan:     1. Type 2 diabetes mellitus with hyperglycemia, without long-term current use of insulin (HCC)        1. Type 2 Diabetes Mellitus   Lab Results   Component Value Date/Time    Hemoglobin A1c 7.1 (H) 04/05/2022 11:08 AM    Hemoglobin A1c (POC) 6.6 10/09/2020 10:03 AM    Hemoglobin A1c, External 6.9 10/29/2019 12:00 AM   A1c March 2021: 7.7  A1c November 2021 7.9  Stable control  Lantus 24 units  Annual eye exam, foot care        2. HTN : Continue current therapy     3. Hyperlipidemia : zetia     4. CAD  No calf claudication    5. Peripheral neuropathy: Followed by neurology    There are no Patient Instructions on file for this visit.   Follow-up and Dispositions    · Return in about 4 months (around 8/12/2022). Thank you for allowing me to participate in the care of this patient. Pennie Carrera MD      Patient verbalized understanding     Voice-recognition software was used to generate this report, which may result in some phonetic-based errors in the grammar and contents. Even though attempts were made to correct all the mistakes, some may have been missed and remained in the body of the report.

## 2022-04-15 ENCOUNTER — APPOINTMENT (OUTPATIENT)
Dept: PHYSICAL THERAPY | Age: 82
End: 2022-04-15
Payer: MEDICARE

## 2022-04-18 ENCOUNTER — HOSPITAL ENCOUNTER (OUTPATIENT)
Dept: PHYSICAL THERAPY | Age: 82
Discharge: HOME | End: 2022-04-18
Payer: MEDICARE

## 2022-04-18 PROCEDURE — 97110 THERAPEUTIC EXERCISES: CPT

## 2022-04-18 NOTE — PROGRESS NOTES
PT DAILY TREATMENT NOTE     Patient Name: Ceci Mooney  Date:2022  : 1940  [x]  Patient  Verified  Payor: Asia Gonzalez / Plan: [place] [place] & B / Product Type: Medicare /    Treatment Area: Pain in left knee [M25.562]  Unilateral primary osteoarthritis, left knee [M17.12]   Next MD APPT: In a year   In time:1:45pm   Out time:2:30pm  Total Treatment Time (min): 40min  Total Timed Codes (min): 40 min  1:1 Treatment Time (MC only): 40 min  Visit #: -     SUBJECTIVE    Any medication changes, allergies to medications, adverse drug reactions, diagnosis change, or new procedure performed?:   [x] No    [] Yes (see summary sheet for update)    Pain Level (0-10 scale) pre treatment: 0/10   Pain Level (0-10 scale) post treatment: 0/10     Subjective functional status/changes:   [] No changes reported   *[place] but does better with the R ear. Patient ambulated into department without any AD or antalgic gait. Patient reporting he is able to drive himself and does not have any difficulty performing ADL's. OBJECTIVE  - states he will ice at home   Modality rationale: decrease edema, decrease inflammation, decrease pain, increase tissue extensibility and increase muscle contraction/control to improve the patients ability to reduce knee pain with mobility.     Min Type Additional Details    [] Estim: []UnAtt   []Att       []TENS instruct                  []IFC  []Premod   []NMES                     []Other:  []w/US   []w/ice   []w/heat  Position:  Location:    []  Ice     []  Heat  []  Ice massage Position:supine with wedge   Location:(L) knee     []  Traction: [] Cervical       []Lumbar                       [] Prone          []Supine                       []Intermittent   []Continuous Lbs:  []w/heat  []W/heat and Estim    []  Ultrasound: []Continuous   [] Pulsed at:                           []1MHz   []3MHz Location:  W/cm2:    *Pt states he will do ice at home*  [] Skin assessment post-treatment: []intact  []redness- no adverse reaction   []redness - adverse reaction:     40 min Therapeutic Exercise:  [x] See flow sheet :   Rationale: increase ROM, increase strength, improve coordination, improve balance and increase proprioception to improve the patients ability to reduce pain with ambulation and return to his PLOF. min Manual Therapy: joint mobs for extension    Rationale: decrease pain, increase ROM, increase tissue extensibility and decrease edema  to improve the patients ability to reduce knee pain with ambulation. With   [x] TE   [] TA   [] Neuro   [] SC   [] other: Patient Education: [x] Review HEP    [] Progressed/Changed HEP based on:   [] positioning   [] body mechanics   [] transfers   [] Use of heat/ice    [] other:            Other Objective/Functional Measures:   AROM- Knee flexion 120 deg, ext 0    ASSESSMENT/Changes in Function:   Patient came in today without strip over incision. Demonstrated to patient how to perform scar massage at home and he will continue with it on his own. He was able to tolerate weight and step downs today without any difficulty. Patient is currently ambulating without an AD. He will continue to ice at home. No pain post treatment session. Patient will continue to benefit from skilled PT services to modify and progress therapeutic interventions, address functional mobility deficits, address ROM deficits, address strength deficits, analyze and address soft tissue restrictions, analyze and cue movement patterns and analyze and modify body mechanics/ergonomics to attain remaining goals. GOALS/Progress towards goals:  Short Term Goals: To be accomplished in 8-12 treatments. 1.  Indep with a HEP to assist in rehab progression. [x]? Met []? Not met []? Partially met   4/8  2. L knee extension to 0 degrees to improve the ability to straighten the L knee. []? Met []? Not met [x]? Partially met   4/8 progressing   3.  Improvement in L quad strength, Pt will be able to fully extend his knee in seated position. []? Met []? Not met [x]? Partially met  4/8 progressing   4. 1-2 cm improvement in swelling to improve mobility of the L knee. [x]? Met []? Not met []? Partially met   4/8    Long Term Goals: To be accomplished in 16-24 treatments. 1.  Pt will be able to ambulate independently without gait deviations or increase in pain. [x]? Met []? Not met []? Partially met   2. Normalized stair climb with use of railing. []? Met []? Not met []? Partially met    3. Pt will be able to sit with equal weight on the LEs and perform sit to stand without UE support. []? Met []? Not met []? Partially met    4. TUG score <20 seconds without a device to reduce risk for falls. []? Met []? Not met []?  Partially     PLAN  [x]  Continue plan of care  [x]  Upgrade activities as tolerated       []  Update interventions per flow sheet       []  Discharge due to:  []  Other:     Joanna Brownlee PTA 4/18/2022

## 2022-04-20 ENCOUNTER — PATIENT OUTREACH (OUTPATIENT)
Dept: CASE MANAGEMENT | Age: 82
End: 2022-04-20

## 2022-04-20 ENCOUNTER — APPOINTMENT (OUTPATIENT)
Dept: PHYSICAL THERAPY | Age: 82
End: 2022-04-20
Payer: MEDICARE

## 2022-04-20 NOTE — PROGRESS NOTES
Patient has graduated from the Transitions of Care Coordination  program on 4/20/2022. Patient's symptoms are stable at this time. Patient/family has the ability to self-manage. Care management goals have been completed at this time. No further care transitions nurse follow up scheduled. Goals Addressed                 This Visit's Progress     COMPLETED: Prevent complications post hospitalization. 1. CTN will monitor X 4 weeks    2. Ensure provider appt is scheduled within 7 days post-discharge 3/21/2022 patient sees ortho NP 3/24/2022 at 11:45 am and sees PCP 4/5/2022 at 10 am    3. Confirm patient attended post-discharge provider apt 4/4/2022 Patient saw ortho surgeon- doing well. 4. Complete post-visit call to confirm attendance and update care needs  3/21/2022 patient stated that he is doing well. He is having some pain, but he is up and moving about. Encouraged patient to sit with leg elevated and use ice several times per day. 4/4/2022 Patient has stopped using pain meds. He is feeling good. No care needs noted. 4/11/2022 patient continues to do well. Getting stronger each day. No care needs. 4/20/2022 patient is getting stronger each day. No care needs. 5. Review/educate common or potential \"red flags\" of condition worsening 3/21/2022 Reviewed signs/symptoms of knee replacement such as infection, stiffness, pain, implant issues- allergy or malfunction of device to name a few. 6. Evaluate adherence to medications and priority barriers to resolve  3/21/2022 Patient stated that he has all meds and is taking as directed. 4/4/2022 Patient continues to have all meds and takes as prescribed. 4/11/2022 patient continues to have all meds and take as instructed. 4/20/2022 Patient has all meds and is taking as he should.       7. Instruct on adherence to medications as ordered and assess for therapeutic response and side-effects  3/21/2022 patient stated that he is aware of why he takes meds and knows when to call physician for issues. 4/4/2022 Patient stated that he has no concerns about meds. 4/11/2022 No medication concerns noted. 4/20/2022 No concerns about meds. 8. Discuss and evaluate ADL performance. Provide recommendations on energy conservation, particularly related to transition home from an inpatient admission. 3/21/2022 Patient stated that he is up moving about. Encouraged patient to use walker for extra support. He begins PT today. When he rests- he is elevating and using ice. 4/4/2022 Patient continues to do his exercises. He is able to do more and more. Healing nicely. 4/11/2022 patient continues to do well. Able to do more and more activity without difficulty. 4/20/2022 Patient is getting better each day. Able to move about with out difficulty. Patient has care transitions nurse contact information for any further questions, concerns, or needs.   Patient's upcoming visits:    Future Appointments   Date Time Provider Kiran Montoya   4/22/2022  1:00 PM Tahira Suazo, DPT [facility]   4/25/2022  1:00 PM Aurea Downs PTA [facility]   4/29/2022  1:00 PM Gurwinder Quiñonez, PT, DPT [facility]   8/9/2022  1:45 PM MD GALEN Lugo [facility]   10/6/2022  2:30 PM Alejandrina Clements MD [facility] BS AM[facility]   3/28/2023 10:50 AM Simin Adorno MD [facility] BS AMB

## 2022-04-22 ENCOUNTER — HOSPITAL ENCOUNTER (OUTPATIENT)
Dept: PHYSICAL THERAPY | Age: 82
Discharge: HOME | End: 2022-04-22
Payer: MEDICARE

## 2022-04-22 PROCEDURE — 97140 MANUAL THERAPY 1/> REGIONS: CPT

## 2022-04-22 PROCEDURE — 97110 THERAPEUTIC EXERCISES: CPT

## 2022-04-22 NOTE — PROGRESS NOTES
PT DAILY TREATMENT NOTE     Patient Name: Ailyn Wade  Date:2022  : 1940  [x]  Patient  Verified  Payor: [place] MEDICARE / Plan: [place] MEDICARE PART A & B / Product Type: Medicare /    Treatment Area: Pain in left knee [M25.562]  Unilateral primary osteoarthritis, left knee [M17.12]   Next MD APPT: In a year   In time:01:00 pm   Out time:148  m  Total Treatment Time (min): 45 min  Total Timed Codes (min): 45 min  1:1 Treatment Time (MC only): 45 min  Visit #:      SUBJECTIVE    Any medication changes, allergies to medications, adverse drug reactions, diagnosis change, or new procedure performed?:   [x] No    [] Yes (see summary sheet for update)    Pain Level (0-10 scale) pre treatment: 0/10   Pain Level (0-10 scale) post treatment: 0/10     Subjective functional status/changes:   [] No changes reported   Reports no pain but increased stiffness today and he has some discomfort when he  bends his knee on the scar. Scar is developing some keloids, stated he puts cream but does not massage it. Patient reports about 60% improvement since he started therapy. Stated he does all his activities without any limitations. He started he feels ready to start playing golf next week but his slightly concern about his balance. Patient reporting he is able to drive himself and does not have any difficulty performing ADL's. Stated he does not do his exercises at home. Patient ambulated into department without any AD or antalgic gait. *[place] but does better with the R ear. OBJECTIVE    Modality rationale: decrease edema, decrease inflammation, decrease pain, increase tissue extensibility and increase muscle contraction/control to improve the patients ability to reduce knee pain with mobility.     Min Type Additional Details    [] Estim: []UnAtt   []Att       []TENS instruct                  []IFC  []Premod   []NMES                     []Other:  []w/US   []w/ice   []w/heat  Position:  Location:   5 []  Ice []  Heat  [x]  Ice massage Position:supine with wedge   Location:(L) knee     []  Traction: [] Cervical       []Lumbar                       [] Prone          []Supine                       []Intermittent   []Continuous Lbs:  []w/heat  []W/heat and Estim    []  Ultrasound: []Continuous   [] Pulsed at:                           []1MHz   []3MHz Location:  W/cm2:      [x] Skin assessment post-treatment:   [x]intact  []redness- no adverse reaction   []redness - adverse reaction:     30 min Therapeutic Exercise:  [x] See flow sheet :   Rationale: increase ROM, increase strength, improve coordination, improve balance and increase proprioception to improve the patients ability to reduce pain with ambulation and return to his PLOF. 10  min Manual Therapy: scar mobilization and effleurage massage    Rationale: decrease pain, increase ROM, increase tissue extensibility and decrease edema  to improve the patients ability to reduce knee pain with ambulation. With   [x] TE   [] TA   [] Neuro   [] SC   [] other: Patient Education: [x] Review HEP    [] Progressed/Changed HEP based on:   [] positioning   [] body mechanics   [] transfers   [] Use of heat/ice    [] other:            Other Objective/Functional Measures:     Re-assessment 4/22   Physical Findings   Ortho:   Posture:   forward flexed posture   Gait and Functional Mobility: ambulated without device with short step length, decreased heel strike and push off noted.    WBS:  WBAT  Palpation: TTP along scar tissue    Swelling: +swelling  - Girth measurement L 39.5  cm joint line, suprapatella  41 cm - decreased from evaluation  Gross findings:  developing some keloids      Specific joints: *normal values in ()  KNEE                    AROM                          PROM                           MMT    R L R L R L   Extension (0)  0      5 4   Flexion (145)  140    143 5 4+   Patellar Mobility:  Good patella mobility   Additional comments:      HIP AROM                            PROM                              MMT    R L R L R L   Flexion (120)         5- 5   Extension (15)               Abduction (40)               Adduction (30)               IR (40)               ER (40)               Additional comments:      ANKLE                               AROM                         PROM                         MMT:    R L R L R L   Dorsiflexion (15)          5 5   Plantarflexion (50)               Inversion (35)                Eversion (25)               Additional comments:   Mobility Assessment: TUG score 15 seconds, no AD: step to pattern on stairs  - slight decreased push off noted                                         Neurological: Reflexes / Sensations: intact   Special Tests: none       Five times sit to stand:    5 reps = 14 sec from chair without UE support     Normative averages:  Clients younger than 61years old  £  10 seconds = Normal   Clients older than 61years old £ 14.2 seconds = Normal   Change of ³ 2.3 seconds shows a significant clinical improvement    Hammad TALAMANTES. Reference values for the five repetition sit to stand test: a descriptive metaanalysis of data from elders. Percept Mot Skills 2006; 103(1):215-222. [facility] Score:   21.30% -improved         ASSESSMENT/Changes in Function:   Patient completed 1 month of therapy, he is progressing very well, he is ambulating without an assistive device without an antalgic gait pattern, he reports no pain and we noted great improvement in his knee flexion/extension AROM. Patient still c/o some stiffness and he has some discomfort when he  bends his knee around the scar. Scar is developing some keloids, patient has been encouraging to do scar tissue massage but he is not compliant with the scar tissue massage or with his HEP. Overall patient reports about 60% improvement since he started therapy. Stated he does all his activities without any limitations. He is able to drive himself and does not have any difficulty performing ADL's. He started he feels ready to start playing golf next week but his slightly concern about his balance and would like to work on that in therapy. We did some scar tissue massage today and used some ice massage to assist with swelling reduction. Patient reports some discomfort around incision post scar mobilization and ice. No pain post treatment session. Patient will continue to benefit from skilled PT services to modify and progress therapeutic interventions, address functional mobility deficits, address ROM deficits, address strength deficits, analyze and address soft tissue restrictions, analyze and cue movement patterns and analyze and modify body mechanics/ergonomics to attain remaining goals. GOALS/Progress towards goals:  Short Term Goals: To be accomplished in 8-12 treatments. 1.  Indep with a HEP to assist in rehab progression. []? Met [x]? Not met []? Partially met   4/8, 4/22 stated he does not do his exercises  2. L knee extension to 0 degrees to improve the ability to straighten the L knee. []? Met []? Not met [x]? Partially met   4/8 progressing   3. Improvement in L quad strength, Pt will be able to fully extend his knee in seated position. []? Met []? Not met [x]? Partially met  4/8 progressing   4. 1-2 cm improvement in swelling to improve mobility of the L knee. [x]? Met []? Not met []? Partially met   4/8    Long Term Goals: To be accomplished in 16-24 treatments. 1.  Pt will be able to ambulate independently without gait deviations or increase in pain. [x]? Met []? Not met []? Partially met   2. Normalized stair climb with use of railing. [x]? Met []? Not met []? Partially met  4/20   3. Pt will be able to sit with equal weight on the LEs and perform sit to stand without UE support. [x]? Met []? Not met []? Partially met    4. TUG score <20 seconds without a device to reduce risk for falls. [x]? Met []? Not met []?  Partially     New goal: 1. Patient will feel more comfortable resume his golf play [] Met [] Not met [] Partially met   2. Patient will be able to ambulate for 6 min without stopping to improve his endurance.  [] Met [] Not met [] Partially met       PLAN  [x]  Continue plan of care  [x]  Upgrade activities as tolerated       []  Update interventions per flow sheet       []  Discharge due to:  []  Other:     Kashif Jalloh, PT, DPT 4/22/2022

## 2022-04-22 NOTE — PROGRESS NOTES
[address], [address] [facility], [address] [place] [address]  Ph: [phone]    Fax: [phone]  Therapy Progress Report  Name: Monalisa Alonzo  : 1940   MD: Toro Tarango MD     Medical Diagnosis: Pain in left knee [M25.562]  Unilateral primary osteoarthritis, left knee [M17.12]  Start of Care: 3/21    Visits from Start of Care: 8   Missed Visits: 1  Certification Period: 3/21/22-22   Frequency/Duration: 2 times a week for 16-24 treatments     Summary of Care/Goals:  Patient completed 1 month of therapy, he is progressing very well, he is ambulating without an assistive device without an antalgic gait pattern, he reports no pain and we noted great improvement in his knee flexion/extension AROM. Patient still c/o some stiffness and he has some discomfort when he  bends his knee around the scar. Scar is developing some keloids, patient has been encouraging to do scar tissue massage but he is not compliant with the scar tissue massage or with his HEP. Overall patient reports about 60% improvement since he started therapy. Stated he does all his activities without any limitations. He is able to drive himself and does not have any difficulty performing ADL's. He started he feels ready to start playing golf next week but his slightly concern about his balance and would like to work on that in therapy. Patient will continue to benefit from skilled PT services to modify and progress therapeutic interventions. We will continue to progress with static/dynamic balance exercises and address functional mobility deficits, address ROM deficits, address strength deficits, analyze and address soft tissue restrictions, analyze and cue movement patterns and analyze and modify body mechanics/ergonomics to attain remaining goals. GOALS/Progress towards goals:  Short Term Goals: To be accomplished in 8-12 treatments. 1.  Indep with a HEP to assist in rehab progression. []?? Met [x]? ? Not met []? ? Partially met   4/8, 4/22 stated he does not do his exercises  2. L knee extension to 0 degrees to improve the ability to straighten the L knee. []?? Met []? ? Not met [x]? ? Partially met   4/8 progressing   3. Improvement in L quad strength, Pt will be able to fully extend his knee in seated position. []?? Met []? ? Not met [x]? ? Partially met  4/8 progressing   4. 1-2 cm improvement in swelling to improve mobility of the L knee. [x]? ? Met []? ? Not met []? ? Partially met   4/8     Long Term Goals: To be accomplished in 16-24 treatments. 1.  Pt will be able to ambulate independently without gait deviations or increase in pain. [x]? ? Met []? ? Not met []? ? Partially met   2.  Normalized stair climb with use of railing. [x]? ? Met []? ? Not met []? ? Partially met  4/20   3. Pt will be able to sit with equal weight on the LEs and perform sit to stand without UE support. [x]? ? Met []? ? Not met []? ? Partially met    4. TUG score <20 seconds without a device to reduce risk for falls. [x]? ? Met []? ? Not met []? ? Partially      New goal:   1. Patient will feel more comfortable resume his golf play []? Met []? Not met []? Partially met   2. Patient will be able to ambulate for 6 min without stopping to improve his endurance. []? Met []? Not met []? Partially met     Re-assessment 4/22   Physical Findings   Ortho:   Posture:   forward flexed posture   Gait and Functional Mobility: ambulated without device with short step length, decreased heel strike and push off noted.    WBS:  WBAT  Palpation: TTP along scar tissue    Swelling: +swelling  - Girth measurement L 39.5  cm joint line, suprapatella  41 cm - decreased from evaluation  Gross findings:  developing some keloids      Specific joints: *normal values in ()  KNEE                    AROM                          PROM                           MMT    R L R L R L   Extension (0)  0       5 4   Flexion (145)   140    143 5 4+   Patellar Mobility:  Good patella mobility   Additional comments:      HIP                                AROM                            PROM                              MMT    R L R L R L   Flexion (120)         5- 5   Extension (15)               Abduction (40)               Adduction (30)               IR (40)               ER (40)               Additional comments:      ANKLE                               AROM                         PROM                         MMT:    R L R L R L   Dorsiflexion (15)          5 5   Plantarflexion (50)               Inversion (35)                Eversion (25)               Additional comments:   Mobility Assessment: TUG score 15 seconds, no AD: step to pattern on stairs  - slight decreased push off noted                                         Neurological: Reflexes / Sensations: intact   Special Tests: none        Five times sit to stand:     5 reps = 14 sec from chair without UE support      Normative averages:  Clients younger than 61years old  £  10 seconds = Normal   Clients older than 61years old £ 14.2 seconds = Normal   Change of ³ 2.3 seconds shows a significant clinical improvement     Hammad TALAMANTES. Reference values for the five repetition sit to stand test: a descriptive metaanalysis of data from elders. Percept Mot Skills 2006; 103(1):215-222.            A[employer]ac Score:   21.30% -improved          Recommendations: continue with therapy with focus on static/dynamic  balance and progress with strengthening. [x]  Plan of care has been reviewed with PTA. Radha Quiñonez, PT, DPT 4/22/2022     ________________________________________________________________________     Please retain this original for your records.

## 2022-04-23 DIAGNOSIS — E11.65 TYPE 2 DIABETES MELLITUS WITH HYPERGLYCEMIA, WITHOUT LONG-TERM CURRENT USE OF INSULIN (HCC): ICD-10-CM

## 2022-04-25 ENCOUNTER — APPOINTMENT (OUTPATIENT)
Dept: PHYSICAL THERAPY | Age: 82
End: 2022-04-25
Payer: MEDICARE

## 2022-04-25 RX ORDER — LISINOPRIL AND HYDROCHLOROTHIAZIDE 12.5; 2 MG/1; MG/1
TABLET ORAL
Qty: 90 TABLET | Refills: 3 | Status: SHIPPED | OUTPATIENT
Start: 2022-04-25

## 2022-04-27 ENCOUNTER — APPOINTMENT (OUTPATIENT)
Dept: PHYSICAL THERAPY | Age: 82
End: 2022-04-27
Payer: MEDICARE

## 2022-04-29 ENCOUNTER — HOSPITAL ENCOUNTER (OUTPATIENT)
Dept: PHYSICAL THERAPY | Age: 82
Discharge: HOME | End: 2022-04-29
Payer: MEDICARE

## 2022-04-29 PROCEDURE — 97112 NEUROMUSCULAR REEDUCATION: CPT

## 2022-04-29 PROCEDURE — 97110 THERAPEUTIC EXERCISES: CPT

## 2022-04-29 NOTE — PROGRESS NOTES
PT DAILY TREATMENT NOTE     Patient Name: Pamella Bermeo  Date:2022  : 1940  [x]  Patient  Verified  Payor: Susan Manzanares / Plan: [place] / Product Type: Medicare /    Treatment Area: Pain in left knee [M25.562]  Unilateral primary osteoarthritis, left knee [M17.12]   Next MD APPT: In a year   In time:01:00 pm   Out time:148  pm  Total Treatment Time (min): 42 min  Total Timed Codes (min): 42 min  1:1 Treatment Time (MC only): 42 min  Visit #:      SUBJECTIVE    Any medication changes, allergies to medications, adverse drug reactions, diagnosis change, or new procedure performed?:   [x] No    [] Yes (see summary sheet for update)    Pain Level (0-10 scale) pre treatment: 0/10   Pain Level (0-10 scale) post treatment: 0/10     Subjective functional status/changes:   [] No changes reported  Reports no pain today, stated here and there he has some discomfort but overall he feels good. Stated he has been doing the scar tissue massage on his knee. His scare did looked better. *[place] but does better with the R ear. OBJECTIVE    Modality rationale: decrease edema, decrease inflammation, decrease pain, increase tissue extensibility and increase muscle contraction/control to improve the patients ability to reduce knee pain with mobility.     Min Type Additional Details    [] Estim: []UnAtt   []Att       []TENS instruct                  []IFC  []Premod   []NMES                     []Other:  []w/US   []w/ice   []w/heat  Position:  Location:    []  Ice     []  Heat  []  Ice massage Position:supine with wedge   Location:(L) knee     []  Traction: [] Cervical       []Lumbar                       [] Prone          []Supine                       []Intermittent   []Continuous Lbs:  []w/heat  []W/heat and Estim    []  Ultrasound: []Continuous   [] Pulsed at:                           []1MHz   []3MHz Location:  W/cm2:      [] Skin assessment post-treatment:   [x]intact  []redness- no adverse reaction   []redness - adverse reaction:     30 min Therapeutic Exercise:  [x] See flow sheet :   Rationale: increase ROM, increase strength, improve coordination, improve balance and increase proprioception to improve the patients ability to reduce pain with ambulation and return to his PLOF. 10 min Neuromuscular Re-education:  []  See flow sheet :   Rationale: increase ROM, increase strength, improve coordination, improve balance and increase proprioception  to improve the patients ability to reduce instability with ambulation      2  min Manual Therapy: scar mobilization     Rationale: decrease pain, increase ROM, increase tissue extensibility and decrease edema  to improve the patients ability to reduce knee pain with ambulation. With   [x] TE   [] TA   [] Neuro   [] SC   [] other: Patient Education: [x] Review HEP    [] Progressed/Changed HEP based on:   [] positioning   [] body mechanics   [] transfers   [] Use of heat/ice    [] other:            Other Objective/Functional Measures: We focused on standing balance exercises. Required cg with alt steps - reported feeling dizzy and requied a seated  Rest.   BP in sitting 106/74 mmHg HR 86bpm stated he ate 1 hour and 1/2 ago and did not drink much water today. Water provided during session. during standing minimal cues required to keep L knee in extension during standing exercises and avoid hip ER. Reports dizziness after standing exercises BP was 107/70 mmHg in sitting  EO- 30 sec with csv  EC- 30 sec with cg increased way in all directions     SLS stance with 8'' steps 30 sec hold on each leg- reports more difficulty when standing on R leg.   SLS with TB patient required cg with      Trampoline reports some labored breathing O2sat was 92% on room air educated on PLB O2sat increased to 98%, during second bout O2sat maintained on 98%       ASSESSMENT/Changes in Function:   We added more balance standing activities, patient had 2 episodes of feeling dizzy, BP was running slightly low but he felt better after drinking water. He reported no pain, we also noted his scar tissue was much looser, stated his been doing the scar massage 3 x per week. He continues to be concern about his balance due to also having neuropathy and he wants to be more active. Patient also presents with decreased endurance and required cues for PLB, if labor breathing continues encouraged to follow up with PCP. Vitals were stable towards end of session. Patient will continue to benefit from skilled PT services to modify and progress therapeutic interventions, address functional mobility deficits, address ROM deficits, address strength deficits, analyze and address soft tissue restrictions, analyze and cue movement patterns and analyze and modify body mechanics/ergonomics to attain remaining goals. GOALS/Progress towards goals:  Short Term Goals: To be accomplished in 8-12 treatments. 1.  Indep with a HEP to assist in rehab progression. []? Met [x]? Not met []? Partially met   4/8, 4/22 stated he does not do his exercises  2. L knee extension to 0 degrees to improve the ability to straighten the L knee. []? Met []? Not met [x]? Partially met   4/8 progressing   3. Improvement in L quad strength, Pt will be able to fully extend his knee in seated position. []? Met []? Not met [x]? Partially met  4/8 progressing   4. 1-2 cm improvement in swelling to improve mobility of the L knee. [x]? Met []? Not met []? Partially met   4/8    Long Term Goals: To be accomplished in 16-24 treatments. 1.  Pt will be able to ambulate independently without gait deviations or increase in pain. [x]? Met []? Not met []? Partially met   2. Normalized stair climb with use of railing. [x]? Met []? Not met []? Partially met  4/20   3. Pt will be able to sit with equal weight on the LEs and perform sit to stand without UE support. [x]? Met []? Not met []? Partially met    4.  TUG score <20 seconds without a device to reduce risk for falls. [x]? Met []? Not met []? Partially     New goal:   1. Patient will feel more comfortable resume his golf play [] Met [] Not met [] Partially met   2. Patient will be able to ambulate for 6 min without stopping to improve his endurance.  [] Met [] Not met [] Partially met       PLAN  [x]  Continue plan of care  [x]  Upgrade activities as tolerated       []  Update interventions per flow sheet       []  Discharge due to:  []  Other:     Zak Montalvo, PT, DPT 4/29/2022

## 2022-05-02 ENCOUNTER — HOSPITAL ENCOUNTER (OUTPATIENT)
Dept: PHYSICAL THERAPY | Age: 82
Discharge: HOME | End: 2022-05-02
Payer: MEDICARE

## 2022-05-02 PROCEDURE — 97110 THERAPEUTIC EXERCISES: CPT

## 2022-05-02 NOTE — PROGRESS NOTES
PT DAILY TREATMENT NOTE     Patient Name: Sarthak Moore  Date:2022  : 1940  [x]  Patient  Verified  Payor: [place]  / Plan: [place] MEDICARE PART A & B / Product Type: Medicare /    Treatment Area: Pain in left knee [M25.562]  Unilateral primary osteoarthritis, left knee [M17.12]   Next MD APPT: In a year   In time:01:00 pm   Out time:1:35  pm  Total Treatment Time (min): 35min  Total Timed Codes (min):35 min  1:1 Treatment Time (MC only): 35 min  Visit #: 10/16-24     SUBJECTIVE    Any medication changes, allergies to medications, adverse drug reactions, diagnosis change, or new procedure performed?:   [x] No    [] Yes (see summary sheet for update)    Pain Level (0-10 scale) pre treatment: 0/10   Pain Level (0-10 scale) post treatment: 0/10     Subjective functional status/changes:   [] No changes reported   Patient stated he was ready for therapy to end. He doesn't have any problems and just wants to go play golf. Patient requested to be discharged after today's session. *[place] but does better with the R ear. OBJECTIVE      35 min Therapeutic Exercise:  [x] See flow sheet :   Rationale: increase ROM, increase strength, improve coordination, improve balance and increase proprioception to improve the patients ability to reduce pain with ambulation and return to his PLOF. min Neuromuscular Re-education:  []  See flow sheet :   Rationale: increase ROM, increase strength, improve coordination, improve balance and increase proprioception  to improve the patients ability to reduce instability with ambulation        min Manual Therapy: scar mobilization     Rationale: decrease pain, increase ROM, increase tissue extensibility and decrease edema  to improve the patients ability to reduce knee pain with ambulation.      With   [] TE   [] TA   [] Neuro   [] SC   [] other: Patient Education: [x] Review HEP    [] Progressed/Changed HEP based on:   [] positioning   [] body mechanics   [] transfers [] Use of heat/ice    [] other: Patient ready for discharge           Other Objective/Functional Measures:       ASSESSMENT/Changes in Function:   Patient requested to be discharged today. He reports he is able to do what he wants. He does have some balance issues but still request to be discharged. He has difficulty with SLS and tandem stance and is aware . He has a HEP unsure of compliance. He is not using any AD to ambulate. He has also returned to playing golf. Plan to discharge him at present time with HEP. Patient will continue to benefit from skilled PT services to modify and progress therapeutic interventions, address functional mobility deficits, address ROM deficits, address strength deficits, analyze and address soft tissue restrictions, analyze and cue movement patterns and analyze and modify body mechanics/ergonomics to attain remaining goals. GOALS/Progress towards goals:  Short Term Goals: To be accomplished in 8-12 treatments. 1.  Indep with a HEP to assist in rehab progression. []? Met [x]? Not met []? Partially met   4/8, 4/22 stated he does not do his exercises  2. L knee extension to 0 degrees to improve the ability to straighten the L knee. []? Met []? Not met [x]? Partially met   4/8 progressing   3. Improvement in L quad strength, Pt will be able to fully extend his knee in seated position. []? Met []? Not met [x]? Partially met  4/8 progressing   4. 1-2 cm improvement in swelling to improve mobility of the L knee. [x]? Met []? Not met []? Partially met   4/8    Long Term Goals: To be accomplished in 16-24 treatments. 1.  Pt will be able to ambulate independently without gait deviations or increase in pain. [x]? Met []? Not met []? Partially met   2. Normalized stair climb with use of railing. [x]? Met []? Not met []? Partially met  4/20   3. Pt will be able to sit with equal weight on the LEs and perform sit to stand without UE support. [x]? Met []? Not met []? Partially met    4. TUG score <20 seconds without a device to reduce risk for falls. [x]? Met []? Not met []? Partially     New goal:   1. Patient will feel more comfortable resume his golf play [] Met [] Not met [x] Partially met Patient reports hei s playing golf  2. Patient will be able to ambulate for 6 min without stopping to improve his endurance.  [] Met [] Not met [] Partially met       PLAN  []  Continue plan of care  []  Upgrade activities as tolerated       []  Update interventions per flow sheet       [x]  Discharge due to:pt. request  []  Other:     Clarke Gilmore, PTA 5/2/2022

## 2022-05-05 NOTE — PROGRESS NOTES
[address] [facility], [address] [place] [address]  Ph: [phone]  Fax: [phone]    Discharge Summary 2-15    Patient name: Ashley Lucas  : 1940  Provider#: [id number]  Referral source: Babak Miller MD      Medical/Treatment Diagnosis: Pain in left knee [M25.562]  Unilateral primary osteoarthritis, left knee [M17.12]     Prior Hospitalization: see medical history     Comorbidities: See Plan of Care  Prior Level of Function: See Plan of Care  Medications: Verified on Patient Summary List  Start of Care: 3/21   Onset Date: (see initial plan of care)  Visits from Start of Care: 10 Missed Visits: 0  Certification Period :3/21/22 to 22    Assessment/Summary of care/GOALS:   Patient was recently re-evaluated on  and ROM/MMT were taken, Patient requested to be discharged today. He reports he is able to do what he wants. He does have some balance issues but still request to be discharged. He has difficulty with SLS and tandem stance and is aware of what he needs to work on. He has a HEP, unsure of compliance. He is not using any AD to ambulate. He has also returned to playing golf. Plan to discharge him at present time with HEP. GOALS/Progress towards goals:  Short Term Goals: To be accomplished in 8-12 treatments. 1.  Indep with a HEP to assist in rehab progression. []?? Met [x]? ? Not met []? ? Partially met   ,  stated he does not do his exercises  2. L knee extension to 0 degrees to improve the ability to straighten the L knee. []?? Met []? ? Not met [x]? ? Partially met    progressing   3. Improvement in L quad strength, Pt will be able to fully extend his knee in seated position. []?? Met []? ? Not met [x]? ? Partially met   progressing   4. 1-2 cm improvement in swelling to improve mobility of the L knee. [x]? ? Met []? ? Not met []? ? Partially met        Long Term Goals: To be accomplished in 16-24 treatments.   1.  Pt will be able to ambulate independently without gait deviations or increase in pain. [x]? ? Met []? ? Not met []? ? Partially met   2.  Normalized stair climb with use of railing. [x]? ? Met []? ? Not met []? ? Partially met  4/20   3. Pt will be able to sit with equal weight on the LEs and perform sit to stand without UE support. [x]? ? Met []? ? Not met []? ? Partially met    4. TUG score <20 seconds without a device to reduce risk for falls. [x]? ? Met []? ? Not met []? ? Partially      New goal:   1. Patient will feel more comfortable resume his golf play [x]? Met []? Not met []? Partially met Patient reports he is playing golf  2. Patient will be able to ambulate for 6 min without stopping to improve his endurance. []? Met [x]? Not met []? Partially met   Ampa Score:  21.30%    RECOMMENDATIONS:  [x]Discontinue therapy:   []Patient has reached or is progressing toward set goals and is independent with HEP   [x]Patient is non-compliant or has abdicated 5/2 patient requested to stop therapy   []Due to lack of appreciable progress towards set goals   []Patient was hospitalized or suffered illness that impacted ability to continue therapy   []Other:   A physical therapist assistant contributed to this documentation.    Lilo Gold, PTA 5/5/2022

## 2022-06-09 ENCOUNTER — APPOINTMENT (OUTPATIENT)
Dept: GENERAL RADIOLOGY | Age: 82
DRG: 884 | End: 2022-06-09
Attending: STUDENT IN AN ORGANIZED HEALTH CARE EDUCATION/TRAINING PROGRAM
Payer: MEDICARE

## 2022-06-09 ENCOUNTER — HOSPITAL ENCOUNTER (EMERGENCY)
Age: 82
Discharge: HOME | DRG: 884 | End: 2022-06-09
Attending: STUDENT IN AN ORGANIZED HEALTH CARE EDUCATION/TRAINING PROGRAM
Payer: MEDICARE

## 2022-06-09 VITALS
SYSTOLIC BLOOD PRESSURE: 105 MMHG | OXYGEN SATURATION: 95 % | HEIGHT: 67 IN | TEMPERATURE: 97.8 F | BODY MASS INDEX: 25.27 KG/M2 | HEART RATE: 99 BPM | RESPIRATION RATE: 17 BRPM | DIASTOLIC BLOOD PRESSURE: 84 MMHG | WEIGHT: 161 LBS

## 2022-06-09 DIAGNOSIS — E86.0 DEHYDRATION: Primary | ICD-10-CM

## 2022-06-09 DIAGNOSIS — H61.23 BILATERAL IMPACTED CERUMEN: ICD-10-CM

## 2022-06-09 LAB
ALBUMIN SERPL-MCNC: 3 G/DL (ref 3.5–5)
ALBUMIN/GLOB SERPL: 0.6 {RATIO} (ref 1.1–2.2)
ALP SERPL-CCNC: 77 U/L (ref 45–117)
ALT SERPL-CCNC: 17 U/L (ref 12–78)
ANION GAP SERPL CALC-SCNC: 11 MMOL/L (ref 5–15)
APPEARANCE UR: ABNORMAL
AST SERPL W P-5'-P-CCNC: 23 U/L (ref 15–37)
BACTERIA URNS QL MICRO: NEGATIVE /HPF
BASE DEFICIT BLDV-SCNC: 6.5 MMOL/L (ref 0–2)
BASOPHILS # BLD: 0 K/UL (ref 0–0.1)
BASOPHILS NFR BLD: 0 % (ref 0–1)
BILIRUB DIRECT SERPL-MCNC: 0.2 MG/DL (ref 0–0.2)
BILIRUB SERPL-MCNC: 0.6 MG/DL (ref 0.2–1)
BILIRUB UR QL: NEGATIVE
BUN SERPL-MCNC: 44 MG/DL (ref 6–20)
BUN/CREAT SERPL: 26 (ref 12–20)
CA-I BLD-MCNC: 8.9 MG/DL (ref 8.5–10.1)
CHLORIDE SERPL-SCNC: 107 MMOL/L (ref 97–108)
CO2 SERPL-SCNC: 16 MMOL/L (ref 21–32)
COLOR UR: ABNORMAL
CREAT SERPL-MCNC: 1.69 MG/DL (ref 0.7–1.3)
DIFFERENTIAL METHOD BLD: ABNORMAL
EOSINOPHIL # BLD: 0 K/UL (ref 0–0.4)
EOSINOPHIL NFR BLD: 1 % (ref 0–7)
ERYTHROCYTE [DISTWIDTH] IN BLOOD BY AUTOMATED COUNT: 12.8 % (ref 11.5–14.5)
FIO2 ON VENT: 21 %
GLOBULIN SER CALC-MCNC: 4.8 G/DL (ref 2–4)
GLUCOSE BLD STRIP.AUTO-MCNC: 182 MG/DL (ref 65–117)
GLUCOSE SERPL-MCNC: 195 MG/DL (ref 65–100)
GLUCOSE UR STRIP.AUTO-MCNC: NEGATIVE MG/DL
HCO3 BLDV-SCNC: 18 MMOL/L (ref 22–26)
HCT VFR BLD AUTO: 46.8 % (ref 36.6–50.3)
HGB BLD-MCNC: 15.8 G/DL (ref 12.1–17)
HGB UR QL STRIP: NEGATIVE
IMM GRANULOCYTES # BLD AUTO: 0 K/UL (ref 0–0.04)
IMM GRANULOCYTES NFR BLD AUTO: 0 % (ref 0–0.5)
KETONES UR QL STRIP.AUTO: 20 MG/DL
LEUKOCYTE ESTERASE UR QL STRIP.AUTO: NEGATIVE
LYMPHOCYTES # BLD: 1.3 K/UL (ref 0.8–3.5)
LYMPHOCYTES NFR BLD: 15 % (ref 12–49)
MCH RBC QN AUTO: 29.3 PG (ref 26–34)
MCHC RBC AUTO-ENTMCNC: 33.8 G/DL (ref 30–36.5)
MCV RBC AUTO: 86.7 FL (ref 80–99)
MONOCYTES # BLD: 0.8 K/UL (ref 0–1)
MONOCYTES NFR BLD: 10 % (ref 5–13)
MUCOUS THREADS URNS QL MICRO: ABNORMAL /LPF
NEUTS SEG # BLD: 6.3 K/UL (ref 1.8–8)
NEUTS SEG NFR BLD: 74 % (ref 32–75)
NITRITE UR QL STRIP.AUTO: NEGATIVE
NRBC # BLD: 0 K/UL (ref 0–0.01)
NRBC BLD-RTO: 0 PER 100 WBC
PCO2 BLDV: 39 MMHG (ref 40–50)
PERFORMED BY, TECHID: ABNORMAL
PH BLDV: 7.3 [PH] (ref 7.31–7.41)
PH UR STRIP: 5 [PH] (ref 5–8)
PLATELET # BLD AUTO: 112 K/UL (ref 150–400)
PO2 BLDV: 32 MMHG (ref 36–42)
POTASSIUM SERPL-SCNC: 4.8 MMOL/L (ref 3.5–5.1)
PROT SERPL-MCNC: 7.8 G/DL (ref 6.4–8.2)
PROT UR STRIP-MCNC: NEGATIVE MG/DL
RBC # BLD AUTO: 5.4 M/UL (ref 4.1–5.7)
RBC #/AREA URNS HPF: ABNORMAL /HPF (ref 0–5)
SAO2 % BLDV: 57 % (ref 60–80)
SAO2% DEVICE SAO2% SENSOR NAME: ABNORMAL
SODIUM SERPL-SCNC: 134 MMOL/L (ref 136–145)
SP GR UR REFRACTOMETRY: 1.02 (ref 1–1.03)
SPECIMEN SITE: ABNORMAL
UROBILINOGEN UR QL STRIP.AUTO: 0.1 EU/DL (ref 0.1–1)
WBC # BLD AUTO: 8.5 K/UL (ref 4.1–11.1)
WBC URNS QL MICRO: ABNORMAL /HPF (ref 0–4)

## 2022-06-09 PROCEDURE — 74011250636 HC RX REV CODE- 250/636: Performed by: STUDENT IN AN ORGANIZED HEALTH CARE EDUCATION/TRAINING PROGRAM

## 2022-06-09 PROCEDURE — 81003 URINALYSIS AUTO W/O SCOPE: CPT

## 2022-06-09 PROCEDURE — 80048 BASIC METABOLIC PNL TOTAL CA: CPT

## 2022-06-09 PROCEDURE — 85025 COMPLETE CBC W/AUTO DIFF WBC: CPT

## 2022-06-09 PROCEDURE — 36415 COLL VENOUS BLD VENIPUNCTURE: CPT

## 2022-06-09 PROCEDURE — 82962 GLUCOSE BLOOD TEST: CPT

## 2022-06-09 PROCEDURE — 82803 BLOOD GASES ANY COMBINATION: CPT

## 2022-06-09 PROCEDURE — 71045 X-RAY EXAM CHEST 1 VIEW: CPT

## 2022-06-09 PROCEDURE — 93005 ELECTROCARDIOGRAM TRACING: CPT

## 2022-06-09 PROCEDURE — 80076 HEPATIC FUNCTION PANEL: CPT

## 2022-06-09 RX ADMIN — SODIUM CHLORIDE 1000 ML: 9 INJECTION, SOLUTION INTRAVENOUS at 17:01

## 2022-06-09 NOTE — ED PROVIDER NOTES
EMERGENCY DEPARTMENT HISTORY AND PHYSICAL EXAM      Date: 6/9/2022  Patient Name: Pamella Bermeo    History of Presenting Illness     Chief Complaint   Patient presents with    Dizziness    Headache    Cough       History Provided By: Patient and Patient's Daughter    HPI: Pamella Bermeo, 78 y.o. male with a past medical history significant CAD, diabetes, hypertension presents to the ED with cc of generalized weakness, loss of appetite. Patient states over the last week he has noticed a poor appetite and has been complaining of worsening generalized weakness. Patient denies any chest pain, shortness of breath, no abdominal pain nausea or vomiting. No fevers chills no dysuria. Daughter concerned that patient may have new onset Parkinson's, PMD has mentioned this before are scheduled to be evaluated by neurologist in 3 weeks. Triage noted patient complaining of dizziness and headache however patient denies the symptoms on my assessment. There are no other complaints, changes, or physical findings at this time. PCP: Luisito Holder MD    Current Outpatient Medications   Medication Sig Dispense Refill    carbamide peroxide (Debrox) 6.5 % otic solution Administer 5 Drops into each ear two (2) times a day. 7.5 mL 0    lisinopril-hydroCHLOROthiazide (PRINZIDE, ZESTORETIC) 20-12.5 mg per tablet TAKE 1 TABLET BY MOUTH DAILY 90 Tablet 3    insulin glargine (Lantus Solostar U-100 Insulin) 100 unit/mL (3 mL) inpn 24 Units by SubCUTAneous route nightly. 30 mL 3    metoprolol succinate (TOPROL-XL) 100 mg tablet Take 100 mg by mouth daily.  ondansetron (ZOFRAN ODT) 4 mg disintegrating tablet Take 1 Tablet by mouth every eight (8) hours as needed for Nausea, Vomiting or Nausea or Vomiting for up to 20 doses. (Patient not taking: Reported on 4/12/2022) 20 Tablet 0    metFORMIN ER (GLUCOPHAGE XR) 500 mg tablet Take 2 Tablets by mouth two (2) times a day.  360 Tablet 3    Accu-Chek Patricia Plus test strp strip USE TO TEST BLOOD SUGAR TWICE DAILY 100 Strip 5    ezetimibe (ZETIA) 10 mg tablet TAKE 1 TABLET BY MOUTH DAILY 90 Tablet 1    lancets misc USE TO TEST BLOOD SUGAR TWICE DAILY Dx Code: E11.65 200 Each 3    cyanocobalamin (VITAMIN B12) 500 mcg tablet Take 500 mcg by mouth daily.  DULoxetine (CYMBALTA) 20 mg capsule TAKE 1 CAPSULE BY MOUTH EVERY DAY 90 Cap 0    Insulin Needles, Disposable, (ALICE PEN NEEDLE) 32 gauge x 5/32\" ndle Use to inject Lantus nightly. DX Code: E11.65 100 Pen Needle 3    ergocalciferol (VITAMIN D2) 50,000 unit capsule Take 5,000 Units by mouth every seven (7) days. Past History     Past Medical History:  Past Medical History:   Diagnosis Date    Cataract     Chronic kidney disease, stage 3 ([facility] [address] 2019    stage 3a mild to moderate loss of kidney function    Coronary arteriosclerosis 2017    Coronary artery disease     Hyperlipidemia     Hypertension     Neuropathy     Type 2 diabetes mellitus ([facility] [address]     Vitamin D deficiency        Past Surgical History:  Past Surgical History:   Procedure Laterality Date    HX CATARACT REMOVAL Bilateral     HX COLONOSCOPY         Family History:  Family History   Problem Relation Age of Onset    Cancer Mother         stomach    Heart Disease Father     Hypertension Father     Cancer Brother         brain tumor       Social History:  Social History     Tobacco Use    Smoking status: Former Smoker     Packs/day: 2.00     Years: 20.00     Pack years: 40.00     Types: Cigarettes     Quit date:      Years since quittin.4    Smokeless tobacco: Never Used   Vaping Use    Vaping Use: Never used   Substance Use Topics    Alcohol use: No    Drug use: No       Allergies:  No Known Allergies      Review of Systems     Review of Systems   Constitutional: Positive for appetite change, fatigue and unexpected weight change. Negative for activity change, chills and fever.    HENT: Negative for congestion, sore throat and trouble swallowing. Eyes: Negative for photophobia and visual disturbance. Respiratory: Negative for cough, chest tightness and shortness of breath. Cardiovascular: Negative for chest pain and palpitations. Gastrointestinal: Negative for abdominal pain and nausea. Genitourinary: Negative for dysuria and flank pain. Musculoskeletal: Negative for arthralgias and neck pain. Skin: Negative for color change and pallor. Neurological: Positive for weakness. Negative for dizziness, numbness and headaches. Physical Exam     Physical Exam  Vitals and nursing note reviewed. Constitutional:       Appearance: Normal appearance. He is normal weight. HENT:      Head: Normocephalic and atraumatic. Nose: Nose normal.      Mouth/Throat:      Mouth: Mucous membranes are dry. Eyes:      Extraocular Movements: Extraocular movements intact. Pupils: Pupils are equal, round, and reactive to light. Cardiovascular:      Rate and Rhythm: Regular rhythm. Tachycardia present. Pulses: Normal pulses. Heart sounds: Normal heart sounds. Pulmonary:      Effort: Pulmonary effort is normal.      Breath sounds: Normal breath sounds. Abdominal:      General: Abdomen is flat. Bowel sounds are normal.      Palpations: Abdomen is soft. Musculoskeletal:         General: No swelling or tenderness. Normal range of motion. Cervical back: Normal range of motion and neck supple. Skin:     General: Skin is warm and dry. Capillary Refill: Capillary refill takes less than 2 seconds. Neurological:      General: No focal deficit present. Mental Status: He is alert and oriented to person, place, and time. Cranial Nerves: No cranial nerve deficit. Sensory: No sensory deficit. Motor: No weakness.    Psychiatric:         Mood and Affect: Mood normal.         Behavior: Behavior normal.         Diagnostic Study Results     Labs -     Recent Results (from the past 12 hour(s)) CBC WITH AUTOMATED DIFF    Collection Time: 06/09/22  4:10 PM   Result Value Ref Range    WBC 8.5 4.1 - 11.1 K/uL    RBC 5.40 4. 10 - 5.70 M/uL    HGB 15.8 12.1 - 17.0 g/dL    HCT 46.8 36.6 - 50.3 %    MCV 86.7 80.0 - 99.0 FL    MCH 29.3 26.0 - 34.0 PG    MCHC 33.8 30.0 - 36.5 g/dL    RDW 12.8 11.5 - 14.5 %    PLATELET 272 (L) 741 - 400 K/uL    NRBC 0.0 0.0  WBC    ABSOLUTE NRBC 0.00 0.00 - 0.01 K/uL    NEUTROPHILS 74 32 - 75 %    LYMPHOCYTES 15 12 - 49 %    MONOCYTES 10 5 - 13 %    EOSINOPHILS 1 0 - 7 %    BASOPHILS 0 0 - 1 %    IMMATURE GRANULOCYTES 0 0 - 0.5 %    ABS. NEUTROPHILS 6.3 1.8 - 8.0 K/UL    ABS. LYMPHOCYTES 1.3 0.8 - 3.5 K/UL    ABS. MONOCYTES 0.8 0.0 - 1.0 K/UL    ABS. EOSINOPHILS 0.0 0.0 - 0.4 K/UL    ABS. BASOPHILS 0.0 0.0 - 0.1 K/UL    ABS. IMM. GRANS. 0.0 0.00 - 0.04 K/UL    DF AUTOMATED     METABOLIC PANEL, BASIC    Collection Time: 06/09/22  4:10 PM   Result Value Ref Range    Sodium 134 (L) 136 - 145 mmol/L    Potassium 4.8 3.5 - 5.1 mmol/L    Chloride 107 97 - 108 mmol/L    CO2 16 (L) 21 - 32 mmol/L    Anion gap 11 5 - 15 mmol/L    Glucose 195 (H) 65 - 100 mg/dL    BUN 44 (H) 6 - 20 mg/dL    Creatinine 1.69 (H) 0.70 - 1.30 mg/dL    BUN/Creatinine ratio 26 (H) 12 - 20      GFR est AA 47 (L) >60 ml/min/1.73m2    GFR est non-AA 39 (L) >60 ml/min/1.73m2    Calcium 8.9 8.5 - 10.1 mg/dL   HEPATIC FUNCTION PANEL    Collection Time: 06/09/22  4:10 PM   Result Value Ref Range    Protein, total 7.8 6.4 - 8.2 g/dL    Albumin 3.0 (L) 3.5 - 5.0 g/dL    Globulin 4.8 (H) 2.0 - 4.0 g/dL    A-G Ratio 0.6 (L) 1.1 - 2.2      Bilirubin, total 0.6 0.2 - 1.0 mg/dL    Bilirubin, direct 0.2 0.0 - 0.2 mg/dL    Alk.  phosphatase 77 45 - 117 U/L    AST (SGOT) 23 15 - 37 U/L    ALT (SGPT) 17 12 - 78 U/L   GLUCOSE, POC    Collection Time: 06/09/22  4:12 PM   Result Value Ref Range    Glucose (POC) 182 (H) 65 - 117 mg/dL    Performed by [facility] GAS    Collection Time: 06/09/22  6:46 PM   Result Value Ref Range    VENOUS PH 7.304 (L) 7.31 - 7.41      VENOUS PCO2 39.0 (L) 40 - 50 mmHg    VENOUS PO2 32 (L) 36 - 42 mmHg    VENOUS O2 SATURATION 57 (L) 60 - 80 %    VENOUS BICARBONATE 18 (L) 22 - 26 mmol/L    VENOUS BASE DEFICIT 6.5 (H) 0 - 2 mmol/L    O2 METHOD Room air      FIO2 21.0 %    Sample source Venous     URINALYSIS W/ RFLX MICROSCOPIC    Collection Time: 06/09/22  7:40 PM   Result Value Ref Range    Color Yellow/Straw      Appearance Turbid (A) Clear      Specific gravity 1.019 1.003 - 1.030      pH (UA) 5.0 5.0 - 8.0      Protein Negative Negative mg/dL    Glucose Negative Negative mg/dL    Ketone 20 (A) Negative mg/dL    Bilirubin Negative Negative      Blood Negative Negative      Urobilinogen 0.1 0.1 - 1.0 EU/dL    Nitrites Negative Negative      Leukocyte Esterase Negative Negative      WBC 0-4 0 - 4 /hpf    RBC 0-5 0 - 5 /hpf    Bacteria Negative Negative /hpf    Mucus Trace /lpf       Radiologic Studies -   [unfilled]  CT Results  (Last 48 hours)    None        CXR Results  (Last 48 hours)               06/09/22 1653  XR CHEST PORT Final result    Impression:  The cardiomediastinal silhouette is appropriate for age, technique,   and lung expansion. Pulmonary vasculature is not congested. The lungs are   essentially clear. No effusion or pneumothorax is seen. Narrative:  1 view comparison February 11                 Medical Decision Making and ED Course   I am the first provider for this patient. I reviewed the vital signs, available nursing notes, past medical history, past surgical history, family history and social history. Vital Signs-Reviewed the patient's vital signs.   Patient Vitals for the past 12 hrs:   Temp Pulse Resp BP SpO2   06/09/22 1657 -- 99 17 105/84 95 %   06/09/22 1553 97.8 °F (36.6 °C) (!) 122 19 123/79 94 %       EKG interpretation: (Preliminary)    Records Reviewed: Nursing Notes    The patient presents with generalized weakness with a differential diagnosis of dehydration, UTI, electrolyte abnormality, anemia, pneumonia      Provider Notes (Medical Decision Making):     MDM   55-year-old male history of diabetes, hypertension, CAD, presents to emergency department for evaluation of generalized weakness loss of appetite over the last week. Physical exam shows well-appearing male, no distress, mild tachycardia noted on triage, mucous membranes appear dry. Will obtain basic lab work, chest x-ray, EKG, ua, continue to monitor patient. ED Course:   Initial assessment performed. The patients presenting problems have been discussed, and they are in agreement with the care plan formulated and outlined with them. I have encouraged them to ask questions as they arise throughout their visit. ED Course as of 06/09/22 2123   Thu Jun 09, 2022 1921 Patient's lab work resulting, metabolic panel significant for bicarb of 16, BUN 44 creatinine 1.69, creatinine at baseline however BUN markedly elevated. Patient's metabolic panel shows no leukocytosis, stable H&H. Suspect severe dehydration. VBG shows mild acidemia 7.304 however patient's anion gap 11 glucose 195. Do not suspect DKA given normal anion gap. Patient given 1 L of normal saline, vital signs have improved. [PZ]   2022 UA shows positive ketones and no white blood cells or bacteria low suspicion for UTI. Most likely patient suffering from severe dehydration. Will discharge patient home, instructed to drink plenty fluids, return to emergency department if any worsening weakness dizziness. [PZ]      ED Course User Index  [PZ] Mague Guerra MD         Procedures       Clarissa Carreno MD  Procedures               Disposition       Discharged    Remove if not discharged  DISCHARGE PLAN:  1.    Current Discharge Medication List      CONTINUE these medications which have NOT CHANGED    Details   lisinopril-hydroCHLOROthiazide (PRINZIDE, ZESTORETIC) 20-12.5 mg per tablet TAKE 1 TABLET BY MOUTH DAILY  Qty: 90 Tablet, Refills: 3    Associated Diagnoses: Type 2 diabetes mellitus with hyperglycemia, without long-term current use of insulin ([facility])      insulin glargine (Lantus Solostar U-100 Insulin) 100 unit/mL (3 mL) inpn 24 Units by SubCUTAneous route nightly. Qty: 30 mL, Refills: 3    Associated Diagnoses: Type 2 diabetes mellitus with hyperglycemia, without long-term current use of insulin ([facility])      metoprolol succinate (TOPROL-XL) 100 mg tablet Take 100 mg by mouth daily. ondansetron (ZOFRAN ODT) 4 mg disintegrating tablet Take 1 Tablet by mouth every eight (8) hours as needed for Nausea, Vomiting or Nausea or Vomiting for up to 20 doses. Qty: 20 Tablet, Refills: 0      metFORMIN ER (GLUCOPHAGE XR) 500 mg tablet Take 2 Tablets by mouth two (2) times a day. Qty: 360 Tablet, Refills: 3    Associated Diagnoses: Type 2 diabetes mellitus with hyperglycemia, without long-term current use of insulin ([facility])      Accu-Chek Patricia Plus test strp strip USE TO TEST BLOOD SUGAR TWICE DAILY  Qty: 100 Strip, Refills: 5    Associated Diagnoses: Type 2 diabetes mellitus with hyperglycemia, without long-term current use of insulin ([facility])      ezetimibe (ZETIA) 10 mg tablet TAKE 1 TABLET BY MOUTH DAILY  Qty: 90 Tablet, Refills: 1    Associated Diagnoses: Mixed hyperlipidemia; Coronary arteriosclerosis      lancets misc USE TO TEST BLOOD SUGAR TWICE DAILY Dx Code: E11.65  Qty: 200 Each, Refills: 3    Associated Diagnoses: Type 2 diabetes mellitus with hyperglycemia, without long-term current use of insulin ([facility])      cyanocobalamin (VITAMIN B12) 500 mcg tablet Take 500 mcg by mouth daily. DULoxetine (CYMBALTA) 20 mg capsule TAKE 1 CAPSULE BY MOUTH EVERY DAY  Qty: 90 Cap, Refills: 0      Insulin Needles, Disposable, (ALICE PEN NEEDLE) 32 gauge x 5/32\" ndle Use to inject Lantus nightly.  DX Code: E11.65  Qty: 100 Pen Needle, Refills: 3    Associated Diagnoses: Type 2 diabetes mellitus with hyperglycemia, without long-term current use of insulin ([facility])      ergocalciferol (VITAMIN D2) 50,000 unit capsule Take 5,000 Units by mouth every seven (7) days. 2.   Follow-up Information     Follow up With Specialties Details Why Contact Info    Adolfo Khoury MD Family Medicine In 1 week As needed [address]  [address] [facility],[address]  [address]  [phone]          3. Return to ED if worse     Diagnosis     Clinical Impression:   1. Dehydration    2. Bilateral impacted cerumen        Attestations:    Darlene Jalloh MD    Please note that this dictation was completed with [employer], the [employer] voice recognition software. Quite often unanticipated grammatical, syntax, homophones, and other interpretive errors are inadvertently transcribed by the computer software. Please disregard these errors. Please excuse any errors that have escaped final proofreading. Thank you.

## 2022-06-10 LAB
ATRIAL RATE: 120 BPM
CALCULATED P AXIS, ECG09: 42 DEGREES
CALCULATED R AXIS, ECG10: 47 DEGREES
CALCULATED T AXIS, ECG11: 27 DEGREES
DIAGNOSIS, 93000: NORMAL
P-R INTERVAL, ECG05: 154 MS
Q-T INTERVAL, ECG07: 332 MS
QRS DURATION, ECG06: 90 MS
QTC CALCULATION (BEZET), ECG08: 469 MS
VENTRICULAR RATE, ECG03: 120 BPM

## 2022-06-10 NOTE — ED NOTES
D/c paperwork given to and discussed w/ patient and adult daughter, both verbalized understanding.  Able to transfer to wheelchair w/out assistance, taken to parking lot

## 2022-06-11 ENCOUNTER — HOSPITAL ENCOUNTER (INPATIENT)
Age: 82
LOS: 3 days | Discharge: HOME HEALTH | DRG: 884 | End: 2022-06-14
Attending: STUDENT IN AN ORGANIZED HEALTH CARE EDUCATION/TRAINING PROGRAM | Admitting: INTERNAL MEDICINE
Payer: MEDICARE

## 2022-06-11 ENCOUNTER — APPOINTMENT (OUTPATIENT)
Dept: CT IMAGING | Age: 82
DRG: 884 | End: 2022-06-11
Attending: INTERNAL MEDICINE
Payer: MEDICARE

## 2022-06-11 DIAGNOSIS — R53.1 WEAKNESS: Primary | ICD-10-CM

## 2022-06-11 DIAGNOSIS — R29.898 MUSCULAR DECONDITIONING: ICD-10-CM

## 2022-06-11 PROBLEM — R41.89 ACUTE COGNITIVE DECLINE: Status: ACTIVE | Noted: 2022-06-11

## 2022-06-11 LAB
ALBUMIN SERPL-MCNC: 3 G/DL (ref 3.5–5)
ALBUMIN/GLOB SERPL: 0.8 {RATIO} (ref 1.1–2.2)
ALP SERPL-CCNC: 70 U/L (ref 45–117)
ALT SERPL-CCNC: 17 U/L (ref 12–78)
ANION GAP SERPL CALC-SCNC: 9 MMOL/L (ref 5–15)
APPEARANCE UR: CLEAR
AST SERPL W P-5'-P-CCNC: 20 U/L (ref 15–37)
BACTERIA URNS QL MICRO: ABNORMAL /HPF
BASOPHILS # BLD: 0 K/UL (ref 0–0.1)
BASOPHILS NFR BLD: 0 % (ref 0–1)
BILIRUB SERPL-MCNC: 0.6 MG/DL (ref 0.2–1)
BILIRUB UR QL: NEGATIVE
BUN SERPL-MCNC: 25 MG/DL (ref 6–20)
BUN/CREAT SERPL: 20 (ref 12–20)
CA-I BLD-MCNC: 8.7 MG/DL (ref 8.5–10.1)
CHLORIDE SERPL-SCNC: 108 MMOL/L (ref 97–108)
CO2 SERPL-SCNC: 20 MMOL/L (ref 21–32)
COLOR UR: ABNORMAL
CREAT SERPL-MCNC: 1.27 MG/DL (ref 0.7–1.3)
DIFFERENTIAL METHOD BLD: ABNORMAL
EOSINOPHIL # BLD: 0.1 K/UL (ref 0–0.4)
EOSINOPHIL NFR BLD: 2 % (ref 0–7)
ERYTHROCYTE [DISTWIDTH] IN BLOOD BY AUTOMATED COUNT: 12.7 % (ref 11.5–14.5)
GLOBULIN SER CALC-MCNC: 3.6 G/DL (ref 2–4)
GLUCOSE BLD STRIP.AUTO-MCNC: 140 MG/DL (ref 65–117)
GLUCOSE BLD STRIP.AUTO-MCNC: 192 MG/DL (ref 65–117)
GLUCOSE SERPL-MCNC: 167 MG/DL (ref 65–100)
GLUCOSE UR STRIP.AUTO-MCNC: NEGATIVE MG/DL
HCT VFR BLD AUTO: 41.7 % (ref 36.6–50.3)
HGB BLD-MCNC: 14.2 G/DL (ref 12.1–17)
HGB UR QL STRIP: NEGATIVE
IMM GRANULOCYTES # BLD AUTO: 0 K/UL (ref 0–0.04)
IMM GRANULOCYTES NFR BLD AUTO: 0 % (ref 0–0.5)
KETONES UR QL STRIP.AUTO: 5 MG/DL
LEUKOCYTE ESTERASE UR QL STRIP.AUTO: NEGATIVE
LYMPHOCYTES # BLD: 1 K/UL (ref 0.8–3.5)
LYMPHOCYTES NFR BLD: 16 % (ref 12–49)
MCH RBC QN AUTO: 29.3 PG (ref 26–34)
MCHC RBC AUTO-ENTMCNC: 34.1 G/DL (ref 30–36.5)
MCV RBC AUTO: 86.2 FL (ref 80–99)
MONOCYTES # BLD: 0.7 K/UL (ref 0–1)
MONOCYTES NFR BLD: 11 % (ref 5–13)
MUCOUS THREADS URNS QL MICRO: ABNORMAL /LPF
NEUTS SEG # BLD: 4.6 K/UL (ref 1.8–8)
NEUTS SEG NFR BLD: 71 % (ref 32–75)
NITRITE UR QL STRIP.AUTO: NEGATIVE
NRBC # BLD: 0 K/UL (ref 0–0.01)
NRBC BLD-RTO: 0 PER 100 WBC
PERFORMED BY, TECHID: ABNORMAL
PERFORMED BY, TECHID: ABNORMAL
PH UR STRIP: 5 [PH] (ref 5–8)
PLATELET # BLD AUTO: 134 K/UL (ref 150–400)
PMV BLD AUTO: 13.5 FL (ref 8.9–12.9)
POTASSIUM SERPL-SCNC: 4.4 MMOL/L (ref 3.5–5.1)
PROT SERPL-MCNC: 6.6 G/DL (ref 6.4–8.2)
PROT UR STRIP-MCNC: NEGATIVE MG/DL
RBC # BLD AUTO: 4.84 M/UL (ref 4.1–5.7)
RBC #/AREA URNS HPF: ABNORMAL /HPF (ref 0–5)
SODIUM SERPL-SCNC: 137 MMOL/L (ref 136–145)
SP GR UR REFRACTOMETRY: 1.01 (ref 1–1.03)
UROBILINOGEN UR QL STRIP.AUTO: 0.1 EU/DL (ref 0.1–1)
WBC # BLD AUTO: 6.5 K/UL (ref 4.1–11.1)
WBC URNS QL MICRO: ABNORMAL /HPF (ref 0–4)

## 2022-06-11 PROCEDURE — 74011000250 HC RX REV CODE- 250: Performed by: INTERNAL MEDICINE

## 2022-06-11 PROCEDURE — 70450 CT HEAD/BRAIN W/O DYE: CPT

## 2022-06-11 PROCEDURE — 80053 COMPREHEN METABOLIC PANEL: CPT

## 2022-06-11 PROCEDURE — 82962 GLUCOSE BLOOD TEST: CPT

## 2022-06-11 PROCEDURE — 99285 EMERGENCY DEPT VISIT HI MDM: CPT

## 2022-06-11 PROCEDURE — 74011250636 HC RX REV CODE- 250/636: Performed by: STUDENT IN AN ORGANIZED HEALTH CARE EDUCATION/TRAINING PROGRAM

## 2022-06-11 PROCEDURE — 74011250637 HC RX REV CODE- 250/637: Performed by: INTERNAL MEDICINE

## 2022-06-11 PROCEDURE — 74011250637 HC RX REV CODE- 250/637: Performed by: STUDENT IN AN ORGANIZED HEALTH CARE EDUCATION/TRAINING PROGRAM

## 2022-06-11 PROCEDURE — 81003 URINALYSIS AUTO W/O SCOPE: CPT

## 2022-06-11 PROCEDURE — 65270000029 HC RM PRIVATE

## 2022-06-11 PROCEDURE — 36415 COLL VENOUS BLD VENIPUNCTURE: CPT

## 2022-06-11 PROCEDURE — 85025 COMPLETE CBC W/AUTO DIFF WBC: CPT

## 2022-06-11 RX ORDER — DOCUSATE SODIUM 100 MG/1
100 CAPSULE, LIQUID FILLED ORAL
Status: COMPLETED | OUTPATIENT
Start: 2022-06-11 | End: 2022-06-11

## 2022-06-11 RX ORDER — ONDANSETRON 2 MG/ML
4 INJECTION INTRAMUSCULAR; INTRAVENOUS
Status: DISCONTINUED | OUTPATIENT
Start: 2022-06-11 | End: 2022-06-14 | Stop reason: HOSPADM

## 2022-06-11 RX ORDER — ACETAMINOPHEN 325 MG/1
650 TABLET ORAL
Status: DISCONTINUED | OUTPATIENT
Start: 2022-06-11 | End: 2022-06-14 | Stop reason: HOSPADM

## 2022-06-11 RX ORDER — DOCUSATE SODIUM 100 MG/1
100 CAPSULE, LIQUID FILLED ORAL ONCE
Status: ACTIVE | OUTPATIENT
Start: 2022-06-11 | End: 2022-06-12

## 2022-06-11 RX ORDER — DULOXETIN HYDROCHLORIDE 20 MG/1
20 CAPSULE, DELAYED RELEASE ORAL DAILY
Status: DISCONTINUED | OUTPATIENT
Start: 2022-06-12 | End: 2022-06-14 | Stop reason: HOSPADM

## 2022-06-11 RX ORDER — EZETIMIBE 10 MG/1
10 TABLET ORAL DAILY
Status: DISCONTINUED | OUTPATIENT
Start: 2022-06-12 | End: 2022-06-14 | Stop reason: HOSPADM

## 2022-06-11 RX ORDER — LANOLIN ALCOHOL/MO/W.PET/CERES
500 CREAM (GRAM) TOPICAL DAILY
Status: DISCONTINUED | OUTPATIENT
Start: 2022-06-12 | End: 2022-06-14 | Stop reason: HOSPADM

## 2022-06-11 RX ORDER — ACETAMINOPHEN 650 MG/1
650 SUPPOSITORY RECTAL
Status: DISCONTINUED | OUTPATIENT
Start: 2022-06-11 | End: 2022-06-14 | Stop reason: HOSPADM

## 2022-06-11 RX ORDER — MAGNESIUM SULFATE 100 %
4 CRYSTALS MISCELLANEOUS AS NEEDED
Status: DISCONTINUED | OUTPATIENT
Start: 2022-06-11 | End: 2022-06-14 | Stop reason: HOSPADM

## 2022-06-11 RX ORDER — SODIUM CHLORIDE 0.9 % (FLUSH) 0.9 %
5-40 SYRINGE (ML) INJECTION EVERY 8 HOURS
Status: DISCONTINUED | OUTPATIENT
Start: 2022-06-11 | End: 2022-06-14 | Stop reason: HOSPADM

## 2022-06-11 RX ORDER — DEXTROSE MONOHYDRATE 100 MG/ML
0-250 INJECTION, SOLUTION INTRAVENOUS AS NEEDED
Status: DISCONTINUED | OUTPATIENT
Start: 2022-06-11 | End: 2022-06-14 | Stop reason: HOSPADM

## 2022-06-11 RX ORDER — METOPROLOL SUCCINATE 50 MG/1
100 TABLET, EXTENDED RELEASE ORAL DAILY
Status: DISCONTINUED | OUTPATIENT
Start: 2022-06-12 | End: 2022-06-14 | Stop reason: HOSPADM

## 2022-06-11 RX ORDER — POLYETHYLENE GLYCOL 3350 17 G/17G
17 POWDER, FOR SOLUTION ORAL DAILY PRN
Status: DISCONTINUED | OUTPATIENT
Start: 2022-06-11 | End: 2022-06-14 | Stop reason: HOSPADM

## 2022-06-11 RX ORDER — ENOXAPARIN SODIUM 100 MG/ML
40 INJECTION SUBCUTANEOUS DAILY
Status: DISCONTINUED | OUTPATIENT
Start: 2022-06-12 | End: 2022-06-14 | Stop reason: HOSPADM

## 2022-06-11 RX ORDER — INSULIN GLARGINE 100 [IU]/ML
24 INJECTION, SOLUTION SUBCUTANEOUS DAILY
Status: DISCONTINUED | OUTPATIENT
Start: 2022-06-12 | End: 2022-06-14 | Stop reason: HOSPADM

## 2022-06-11 RX ORDER — ONDANSETRON 4 MG/1
4 TABLET, ORALLY DISINTEGRATING ORAL
Status: DISCONTINUED | OUTPATIENT
Start: 2022-06-11 | End: 2022-06-14 | Stop reason: HOSPADM

## 2022-06-11 RX ORDER — INSULIN LISPRO 100 [IU]/ML
INJECTION, SOLUTION INTRAVENOUS; SUBCUTANEOUS
Status: DISCONTINUED | OUTPATIENT
Start: 2022-06-11 | End: 2022-06-14 | Stop reason: HOSPADM

## 2022-06-11 RX ORDER — DOCUSATE SODIUM 100 MG/1
100 CAPSULE, LIQUID FILLED ORAL DAILY
Status: DISCONTINUED | OUTPATIENT
Start: 2022-06-12 | End: 2022-06-14 | Stop reason: HOSPADM

## 2022-06-11 RX ORDER — SODIUM CHLORIDE 0.9 % (FLUSH) 0.9 %
5-40 SYRINGE (ML) INJECTION AS NEEDED
Status: DISCONTINUED | OUTPATIENT
Start: 2022-06-11 | End: 2022-06-14 | Stop reason: HOSPADM

## 2022-06-11 RX ADMIN — DOCUSATE SODIUM 100 MG: 100 CAPSULE, LIQUID FILLED ORAL at 15:19

## 2022-06-11 RX ADMIN — SODIUM CHLORIDE, PRESERVATIVE FREE 10 ML: 5 INJECTION INTRAVENOUS at 21:57

## 2022-06-11 RX ADMIN — ACETAMINOPHEN 650 MG: 325 TABLET ORAL at 22:12

## 2022-06-11 RX ADMIN — SODIUM CHLORIDE 1000 ML: 9 INJECTION, SOLUTION INTRAVENOUS at 15:42

## 2022-06-11 RX ADMIN — CARBAMIDE PEROXIDE 6.5% 5 DROP: 6.5 LIQUID AURICULAR (OTIC) at 21:58

## 2022-06-11 NOTE — PROGRESS NOTES
Reason for Admission:  AMS                     RUR Score: N/A                    Plan for utilizing home health:          PCP: First and Last name:  Adolfo Khoury MD     Name of Practice:    Are you a current patient: Yes/No:    Approximate date of last visit: Job Shields   Can you participate in a virtual visit with your PCP:                     Current Advanced Directive/Advance Care Plan: Prior      Healthcare Decision Maker:   Click here to complete [address] including selection of the Healthcare Decision Maker Relationship (ie \"Primary\")             Primary Decision Maker: Cynthia Talavera - Child - [phone]                  Transition of Care Plan:      Met f/f with Pt daughter, she confirmed that the information on the face sheet is correct. Pt daughter stated that Pt lives with his son. Pt daughter stated that Pt son works double shifts as a cook. Pt daughter stated no HH, no DME, and independent with ADL. Pt daughter stated that Pt uses [facility] on [address] [address]. Pt daughter stated that Pt was playing golf before Memorial Day and she feels that he got dehydrated and did not tell anyone. Pt daughter stated that Pt has always been active and she is concerned about the change. Pt daughter stated that Pt has gotten weak and does not eat. Discussed with Pt daughter about Pt going to an IRF, she felt that would help Pt. Pt daughter asked CM to send the referral to [facility]. Will wait for the recommendations from PT/OT. Choice letter signed and placed on Pt chart.      CM dispo: IRF

## 2022-06-11 NOTE — ED PROVIDER NOTES
EMERGENCY DEPARTMENT HISTORY AND PHYSICAL EXAM      Date: 6/11/2022  Patient Name: Ailyn Wade    History of Presenting Illness     Chief Complaint   Patient presents with    Altered mental status       History Provided By: Patient and Patient's Daughter    HPI: Ailyn Wade, 78 y.o. male with a past medical history significant CKD, CAD, hypertension, diabetes presents to the ED with cc of generalized weakness, fatigue. Patient was seen 2 days ago in the emergency department for similar. At that time was diagnosed with dehydration, received 1 L normal saline, discharged home. Patient's daughter states that he felt slightly improved after discharge however symptoms were at resumed today. Additionally patient complaining of trouble hearing, dizziness. Daughter brought patient back to emergency department for reevaluation and possible cerumen impaction clearance. No note of any fevers chills, no chest pain shortness of breath, no abdominal pain nausea or vomiting. There are no other complaints, changes, or physical findings at this time. PCP: Levi Pereira MD    No current facility-administered medications on file prior to encounter. Current Outpatient Medications on File Prior to Encounter   Medication Sig Dispense Refill    carbamide peroxide (Debrox) 6.5 % otic solution Administer 5 Drops into each ear two (2) times a day. 7.5 mL 0    lisinopril-hydroCHLOROthiazide (PRINZIDE, ZESTORETIC) 20-12.5 mg per tablet TAKE 1 TABLET BY MOUTH DAILY 90 Tablet 3    insulin glargine (Lantus Solostar U-100 Insulin) 100 unit/mL (3 mL) inpn 24 Units by SubCUTAneous route nightly. 30 mL 3    metoprolol succinate (TOPROL-XL) 100 mg tablet Take 100 mg by mouth daily.  ondansetron (ZOFRAN ODT) 4 mg disintegrating tablet Take 1 Tablet by mouth every eight (8) hours as needed for Nausea, Vomiting or Nausea or Vomiting for up to 20 doses.  (Patient not taking: Reported on 4/12/2022) 20 Tablet 0    metFORMIN ER (GLUCOPHAGE XR) 500 mg tablet Take 2 Tablets by mouth two (2) times a day. 360 Tablet 3    Accu-Chek Patricia Plus test strp strip USE TO TEST BLOOD SUGAR TWICE DAILY 100 Strip 5    ezetimibe (ZETIA) 10 mg tablet TAKE 1 TABLET BY MOUTH DAILY 90 Tablet 1    lancets misc USE TO TEST BLOOD SUGAR TWICE DAILY Dx Code: E11.65 200 Each 3    cyanocobalamin (VITAMIN B12) 500 mcg tablet Take 500 mcg by mouth daily.  DULoxetine (CYMBALTA) 20 mg capsule TAKE 1 CAPSULE BY MOUTH EVERY DAY 90 Cap 0    Insulin Needles, Disposable, (ALICE PEN NEEDLE) 32 gauge x 5/32\" ndle Use to inject Lantus nightly. DX Code: E11.65 100 Pen Needle 3    ergocalciferol (VITAMIN D2) 50,000 unit capsule Take 5,000 Units by mouth every seven (7) days. Past History     Past Medical History:  Past Medical History:   Diagnosis Date    Cataract     Chronic kidney disease, stage 3 ([facility] [address] 2019    stage 3a mild to moderate loss of kidney function    Coronary arteriosclerosis 2017    Coronary artery disease     Hyperlipidemia     Hypertension     Neuropathy     Type 2 diabetes mellitus ([facility] [address]     Vitamin D deficiency        Past Surgical History:  Past Surgical History:   Procedure Laterality Date    HX CATARACT REMOVAL Bilateral     HX COLONOSCOPY         Family History:  Family History   Problem Relation Age of Onset    Cancer Mother         stomach    Heart Disease Father     Hypertension Father     Cancer Brother         brain tumor       Social History:  Social History     Tobacco Use    Smoking status: Former Smoker     Packs/day: 2.00     Years: 20.00     Pack years: 40.00     Types: Cigarettes     Quit date:      Years since quittin.4    Smokeless tobacco: Never Used   Vaping Use    Vaping Use: Never used   Substance Use Topics    Alcohol use: No    Drug use: No       Allergies:  No Known Allergies      Review of Systems     Review of Systems   Constitutional: Positive for fatigue. Negative for activity change, appetite change, chills and fever. HENT: Positive for hearing loss. Negative for congestion, sore throat and trouble swallowing. Eyes: Negative for photophobia and visual disturbance. Respiratory: Negative for cough, chest tightness and shortness of breath. Cardiovascular: Negative for chest pain and palpitations. Gastrointestinal: Negative for abdominal pain and nausea. Genitourinary: Negative for dysuria and flank pain. Musculoskeletal: Negative for arthralgias and neck pain. Skin: Negative for color change and pallor. Neurological: Positive for dizziness and weakness. Negative for numbness and headaches. Physical Exam     Physical Exam  Vitals and nursing note reviewed. Constitutional:       Appearance: Normal appearance. He is normal weight. HENT:      Head: Normocephalic and atraumatic. Nose: Nose normal.      Mouth/Throat:      Mouth: Mucous membranes are moist.   Eyes:      Extraocular Movements: Extraocular movements intact. Pupils: Pupils are equal, round, and reactive to light. Cardiovascular:      Rate and Rhythm: Normal rate and regular rhythm. Pulses: Normal pulses. Heart sounds: Normal heart sounds. Pulmonary:      Effort: Pulmonary effort is normal.      Breath sounds: Normal breath sounds. Abdominal:      General: Abdomen is flat. Bowel sounds are normal.      Palpations: Abdomen is soft. Musculoskeletal:         General: No swelling or tenderness. Normal range of motion. Cervical back: Normal range of motion and neck supple. Skin:     General: Skin is warm and dry. Capillary Refill: Capillary refill takes less than 2 seconds. Neurological:      General: No focal deficit present. Mental Status: He is alert and oriented to person, place, and time. Mental status is at baseline. Cranial Nerves: No cranial nerve deficit. Sensory: No sensory deficit. Motor: No weakness.    Psychiatric: Mood and Affect: Mood normal.         Speech: Speech normal.         Behavior: Behavior normal.         Diagnostic Study Results     Labs -     Recent Results (from the past 12 hour(s))   METABOLIC PANEL, COMPREHENSIVE    Collection Time: 06/11/22  3:41 PM   Result Value Ref Range    Sodium 137 136 - 145 mmol/L    Potassium 4.4 3.5 - 5.1 mmol/L    Chloride 108 97 - 108 mmol/L    CO2 20 (L) 21 - 32 mmol/L    Anion gap 9 5 - 15 mmol/L    Glucose 167 (H) 65 - 100 mg/dL    BUN 25 (H) 6 - 20 mg/dL    Creatinine 1.27 0.70 - 1.30 mg/dL    BUN/Creatinine ratio 20 12 - 20      GFR est AA >60 >60 ml/min/1.73m2    GFR est non-AA 54 (L) >60 ml/min/1.73m2    Calcium 8.7 8.5 - 10.1 mg/dL    Bilirubin, total 0.6 0.2 - 1.0 mg/dL    AST (SGOT) 20 15 - 37 U/L    ALT (SGPT) 17 12 - 78 U/L    Alk. phosphatase 70 45 - 117 U/L    Protein, total 6.6 6.4 - 8.2 g/dL    Albumin 3.0 (L) 3.5 - 5.0 g/dL    Globulin 3.6 2.0 - 4.0 g/dL    A-G Ratio 0.8 (L) 1.1 - 2.2     CBC WITH AUTOMATED DIFF    Collection Time: 06/11/22  3:41 PM   Result Value Ref Range    WBC 6.5 4.1 - 11.1 K/uL    RBC 4.84 4.10 - 5.70 M/uL    HGB 14.2 12.1 - 17.0 g/dL    HCT 41.7 36.6 - 50.3 %    MCV 86.2 80.0 - 99.0 FL    MCH 29.3 26.0 - 34.0 PG    MCHC 34.1 30.0 - 36.5 g/dL    RDW 12.7 11.5 - 14.5 %    PLATELET 018 (L) 806 - 400 K/uL    MPV 13.5 (H) 8.9 - 12.9 FL    NRBC 0.0 0.0  WBC    ABSOLUTE NRBC 0.00 0.00 - 0.01 K/uL    NEUTROPHILS 71 32 - 75 %    LYMPHOCYTES 16 12 - 49 %    MONOCYTES 11 5 - 13 %    EOSINOPHILS 2 0 - 7 %    BASOPHILS 0 0 - 1 %    IMMATURE GRANULOCYTES 0 0 - 0.5 %    ABS. NEUTROPHILS 4.6 1.8 - 8.0 K/UL    ABS. LYMPHOCYTES 1.0 0.8 - 3.5 K/UL    ABS. MONOCYTES 0.7 0.0 - 1.0 K/UL    ABS. EOSINOPHILS 0.1 0.0 - 0.4 K/UL    ABS. BASOPHILS 0.0 0.0 - 0.1 K/UL    ABS. IMM.  GRANS. 0.0 0.00 - 0.04 K/UL    DF AUTOMATED         Radiologic Studies -   @[email or web address]@  CT Results  (Last 48 hours)    None        CXR Results  (Last 48 hours)    None Medical Decision Making   I am the first provider for this patient. I reviewed the vital signs, available nursing notes, past medical history, past surgical history, family history and social history. Vital Signs-Reviewed the patient's vital signs. Patient Vitals for the past 12 hrs:   Temp Pulse Resp BP SpO2   06/11/22 1451 97.9 °F (36.6 °C) (!) 109 19 131/75 97 %       Records Reviewed: Nursing Notes    The patient presents with generalized weakness, fatigue, with a differential diagnosis of dehydration, electrolyte abnormality, UTI, deconditioning      Provider Notes (Medical Decision Making):     MDM   64-year-old male, history of diabetes, CAD, CKD, presents emergency department with daughter for reevaluation for subacute onset of generalized weakness. Patient notes that over the last several weeks has declined functionally. No complaints of any focal weakness, no chest pain no shortness of breath. Was seen 2 days ago in emergency department, diagnosed with dehydration. Physical today shows well-appearing male, no distress, mild tachycardia noted, pulse 100, normotensive, afebrile, nonfocal neurological exam.    We will redraw basic lab work, administer 1 L normal saline, attempt cerumen disimpaction bilateral ears. I discussed case with case management,  recommends possible admission to hospital for weakness, deconditioning and possible placement to [facility]. ED Course:   Initial assessment performed. The patients presenting problems have been discussed, and they are in agreement with the care plan formulated and outlined with them. I have encouraged them to ask questions as they arise throughout their visit. ED Course as of 06/11/22 1656   Sat Jun 11, 2022   1654 Patient's lab work shows mild dehydration BUN 25 creatinine 1.27, CBC grossly unremarkable no leukocytosis stable H&H.   Patient received 1 L normal saline, marginally improved left cerumen impaction right appears to still be impacted we will continue Colace drops with flushes. Patient was evaluated by case management who recommends PT OT eval possible rehab placement, given difficulty caring for patient at home, I discussed case with hospitalist Dr. Gualberto Deng, who agreed to admit patient for dehydration, general deconditioning. [PZ]      ED Course User Index  [PZ] Agatha Hdez MD         PROCEDURES  Procedures         PLAN:  1. Current Discharge Medication List        2. Follow-up Information    None       Return to ED if worse     Diagnosis     Clinical Impression:   1. Weakness    2.  Muscular deconditioning

## 2022-06-11 NOTE — ED TRIAGE NOTES
\"no energy\" per daughter pt is confused, was seen here recently and states pt was suppose to be prescribed something for his ear but no meds was called in to pharmacy. Also states pt not eating and drinking.

## 2022-06-11 NOTE — H&P
GENERAL GENERIC H&P/CONSULT    Subjective:    81M with past medical history of hypertension, coronary disease, dyslipidemia, diabetes on insulin, diabetic neuropathy, agent orange exposure. 3/17/22 he underwent a left total knee arthroplasty at [facility], followed by physical therapy and rehabilitation. In the postop course did quite well with recovery, started being more active and playing golf. At baseline he lives by himself and can drive and manage his own affairs. 6/9/22 presents to ED with rapid cognitive decline and physical debility with development over the previous few weeks. Difficulty in paying attention, difficulty remembering, not eating well, weight loss, difficulty in ambulation. During the ED course thought to be dehydrated and discharged home. 6/11/22 returns to hospital as family is unable to manage, with continued inability to eat, unable to ambulate, worsening cognition. Daughter reports she has an outpatient visit with neurology scheduled for next month but no prior formal neurological evaluation. I have been asked to admit to hospital for further stabilization and workup of his condition. Past Medical History:   Diagnosis Date    Cataract     Chronic kidney disease, stage 3 ([address] 7/24/2019    stage 3a mild to moderate loss of kidney function    Coronary arteriosclerosis 6/29/2017    Coronary artery disease     Hyperlipidemia     Hypertension     Neuropathy     Type 2 diabetes mellitus ([address]     Vitamin D deficiency       Past Surgical History:   Procedure Laterality Date    HX CATARACT REMOVAL Bilateral     HX COLONOSCOPY  2005    HX KNEE REPLACEMENT Left       Prior to Admission medications    Medication Sig Start Date End Date Taking? Authorizing Provider   carbamide peroxide (Debrox) 6.5 % otic solution Administer 5 Drops into each ear two (2) times a day.  6/9/22   Allen Luis MD   lisinopril-hydroCHLOROthiazide (Lian Carlos) 20-12.5 mg per tablet TAKE 1 TABLET BY MOUTH DAILY 22   Noemi Holliday MD   insulin glargine (Lantus Solostar U-100 Insulin) 100 unit/mL (3 mL) inpn 24 Units by SubCUTAneous route nightly. 22   Noemi Holliday MD   metoprolol succinate (TOPROL-XL) 100 mg tablet Take 100 mg by mouth daily. 22   Provider, Historical   ondansetron (ZOFRAN ODT) 4 mg disintegrating tablet Take 1 Tablet by mouth every eight (8) hours as needed for Nausea, Vomiting or Nausea or Vomiting for up to 20 doses. Patient not taking: Reported on 2022 3/8/22   ANDRAE Goddard   metFORMIN ER (GLUCOPHAGE XR) 500 mg tablet Take 2 Tablets by mouth two (2) times a day. 22   Dulce Bright MD   Accu-Chek Patricia Plus test strp strip USE TO TEST BLOOD SUGAR TWICE DAILY 21   Noemi Holliday MD   ezetimibe (ZETIA) 10 mg tablet TAKE 1 TABLET BY MOUTH DAILY 21   Marleny Gomez MD   lancets misc USE TO TEST BLOOD SUGAR TWICE DAILY Dx Code: E11.65 21   Noemi Holliday MD   cyanocobalamin (VITAMIN B12) 500 mcg tablet Take 500 mcg by mouth daily. Provider, Historical   DULoxetine (CYMBALTA) 20 mg capsule TAKE 1 CAPSULE BY MOUTH EVERY DAY 20   Liliane Sorensen NP   Insulin Needles, Disposable, (ALICE PEN NEEDLE) 32 gauge x 5/32\" ndle Use to inject Lantus nightly. DX Code: E11.65 19   Noemi Holliday MD   ergocalciferol (VITAMIN D2) 50,000 unit capsule Take 5,000 Units by mouth every seven (7) days.     Provider, Historical     No Known Allergies   Social History     Tobacco Use    Smoking status: Former Smoker     Packs/day: 2.00     Years: 20.00     Pack years: 40.00     Types: Cigarettes     Quit date: 26     Years since quittin.4    Smokeless tobacco: Never Used   Substance Use Topics    Alcohol use: No      Family History   Problem Relation Age of Onset    Cancer Mother         stomach    Heart Disease Father     Hypertension Father     Cancer Brother         brain tumor      Review of Systems   Unable to perform ROS: Acuity of condition   All other systems reviewed and are negative. Objective:    No intake/output data recorded. No intake/output data recorded. Patient Vitals for the past 8 hrs:   BP Temp Pulse Resp SpO2 Height Weight   06/11/22 1451 131/75 97.9 °F (36.6 °C) (!) 109 19 97 % 5' 8\" (1.727 m) 73 kg (161 lb)     Physical Exam  Vitals and nursing note reviewed. HENT:      Head: Normocephalic and atraumatic. Nose: Nose normal.      Mouth/Throat:      Mouth: Mucous membranes are dry. Eyes:      Extraocular Movements: Extraocular movements intact. Pupils: Pupils are equal, round, and reactive to light. Cardiovascular:      Rate and Rhythm: Normal rate and regular rhythm. Pulses: Normal pulses. Heart sounds: Normal heart sounds. Pulmonary:      Effort: Pulmonary effort is normal.      Breath sounds: Normal breath sounds. Abdominal:      General: Abdomen is flat. Palpations: Abdomen is soft. Musculoskeletal:         General: No swelling or deformity. Normal range of motion. Cervical back: Normal range of motion and neck supple. Right lower leg: No edema. Left lower leg: No edema. Skin:     General: Skin is dry. Neurological:      Mental Status: He is alert. He is disoriented. Sensory: Sensory deficit present. Motor: No weakness.    Psychiatric:         Mood and Affect: Mood normal.          Labs:    Recent Results (from the past 24 hour(s))   METABOLIC PANEL, COMPREHENSIVE    Collection Time: 06/11/22  3:41 PM   Result Value Ref Range    Sodium 137 136 - 145 mmol/L    Potassium 4.4 3.5 - 5.1 mmol/L    Chloride 108 97 - 108 mmol/L    CO2 20 (L) 21 - 32 mmol/L    Anion gap 9 5 - 15 mmol/L    Glucose 167 (H) 65 - 100 mg/dL    BUN 25 (H) 6 - 20 mg/dL    Creatinine 1.27 0.70 - 1.30 mg/dL    BUN/Creatinine ratio 20 12 - 20      GFR est AA >60 >60 ml/min/1.73m2    GFR est non-AA 54 (L) >60 ml/min/1.73m2 Calcium 8.7 8.5 - 10.1 mg/dL    Bilirubin, total 0.6 0.2 - 1.0 mg/dL    AST (SGOT) 20 15 - 37 U/L    ALT (SGPT) 17 12 - 78 U/L    Alk. phosphatase 70 45 - 117 U/L    Protein, total 6.6 6.4 - 8.2 g/dL    Albumin 3.0 (L) 3.5 - 5.0 g/dL    Globulin 3.6 2.0 - 4.0 g/dL    A-G Ratio 0.8 (L) 1.1 - 2.2     CBC WITH AUTOMATED DIFF    Collection Time: 06/11/22  3:41 PM   Result Value Ref Range    WBC 6.5 4.1 - 11.1 K/uL    RBC 4.84 4.10 - 5.70 M/uL    HGB 14.2 12.1 - 17.0 g/dL    HCT 41.7 36.6 - 50.3 %    MCV 86.2 80.0 - 99.0 FL    MCH 29.3 26.0 - 34.0 PG    MCHC 34.1 30.0 - 36.5 g/dL    RDW 12.7 11.5 - 14.5 %    PLATELET 646 (L) 662 - 400 K/uL    MPV 13.5 (H) 8.9 - 12.9 FL    NRBC 0.0 0.0  WBC    ABSOLUTE NRBC 0.00 0.00 - 0.01 K/uL    NEUTROPHILS 71 32 - 75 %    LYMPHOCYTES 16 12 - 49 %    MONOCYTES 11 5 - 13 %    EOSINOPHILS 2 0 - 7 %    BASOPHILS 0 0 - 1 %    IMMATURE GRANULOCYTES 0 0 - 0.5 %    ABS. NEUTROPHILS 4.6 1.8 - 8.0 K/UL    ABS. LYMPHOCYTES 1.0 0.8 - 3.5 K/UL    ABS. MONOCYTES 0.7 0.0 - 1.0 K/UL    ABS. EOSINOPHILS 0.1 0.0 - 0.4 K/UL    ABS. BASOPHILS 0.0 0.0 - 0.1 K/UL    ABS. IMM. GRANS. 0.0 0.00 - 0.04 K/UL    DF AUTOMATED         ECG:   Sinus tachycardia with Premature atrial complexes   Otherwise normal ECG   No previous ECGs available   Confirmed by Sofie Tate M.D., Joelle Krabbe ([id number]) on 6/10/2022 11:13:00 AM     Chest X-Ray:   IMPRESSION  The cardiomediastinal silhouette is appropriate for age, technique,  and lung expansion. Pulmonary vasculature is not congested. The lungs are  essentially clear. No effusion or pneumothorax is seen. CT Results  (Last 48 hours)               06/11/22 3253  CT HEAD WO CONT Final result    Impression:  1. No evidence of acute intracranial hemorrhage or mass effect. 2.  Mild to moderate diffuse parenchymal volume loss without a lobar   predilection.        Narrative:  CT HEAD WITHOUT IV CONTRAST       CLINICAL INDICATION: Cognitive decline over 3 weeks TECHNIQUE: Routine axial images were obtained through the brain without the use   of IV contrast. Sagittal and coronal reformatted images were performed at the CT   console. Dose reduction: Per department policy, all CT scans at this facility   are performed using dose reduction optimization techniques as appropriate to a   performed examination including the following: Automated exposure control,   adjustments of the mA and/or KV according to patient size, or use of iterative   reconstruction technique. COMPARISON: None. FINDINGS:        No evidence of acute intracranial hemorrhage or mass effect. Mild to moderate prominence of the extra-axial spaces, with commensurate   ventricular enlargement. No hydrocephalus. Portions of the posterior fossa not obscured by streak artifact are   unremarkable. Absent bilateral native lenses. Paranasal sinuses are predominantly clear. Tympanomastoid cavities are clear. No acute calvarial abnormality. Atherosclerotic calcification of the internal carotid arteries. Right TMJ arthrosis. Degenerative changes at the anterior arch of C1 and dens. Assessment:  Active Problems:    Acute cognitive decline (6/11/2022)    81M with past medical history of hypertension, coronary disease, dyslipidemia, diabetes on insulin, diabetic neuropathy, agent orange exposure. 3/17/22 he underwent a left total knee arthroplasty at [facility], followed by physical therapy and rehabilitation. In the postop course did quite well with recovery, started being more active and playing golf. At baseline he lives by himself and can drive and manage his own affairs. 6/9/22 presents to ED with rapid cognitive decline and physical debility with development over the previous few weeks. Difficulty in paying attention, difficulty remembering, not eating well, weight loss, difficulty in ambulation.  During the ED course thought to be dehydrated and discharged home. 6/11/22 returns to hospital as family is unable to manage, with continued inability to eat, unable to ambulate, worsening cognition. Daughter reports she has an outpatient visit with neurology scheduled for next month but no prior formal neurological evaluation. I have been asked to admit to hospital for further stabilization and workup of his condition. Plan:    1) Worsening cognitive and physical decline over the past three weeks. Associated with difficulty remembering things, unable to ambulate, unable to care for himself, unable to eat. Admit to hospital   Telemetry monitoring   Neuro checks   Neurology   Physical therapy   Speech therapy    2) Cardiovascular issues including hypertension and dyslipidemia.      Telemetry monitoring   Echocardiogram   Continue home meds    Lisinopril HCTZ    Metoprolol    Ezetimibe    3) Diabetes mellitus     Continue basal glargine   Sliding scale lispro ACHS   Hold metformin while in hospital   Duloxetine for diabetic neuropathy    4) DVT prophylaxis with enoxaparin      Signed:  Courtney Hackett MD 6/11/2022

## 2022-06-12 ENCOUNTER — APPOINTMENT (OUTPATIENT)
Dept: NON INVASIVE DIAGNOSTICS | Age: 82
DRG: 884 | End: 2022-06-12
Attending: INTERNAL MEDICINE
Payer: MEDICARE

## 2022-06-12 LAB
ALBUMIN SERPL-MCNC: 2.5 G/DL (ref 3.5–5)
ALBUMIN/GLOB SERPL: 0.8 {RATIO} (ref 1.1–2.2)
ALP SERPL-CCNC: 61 U/L (ref 45–117)
ALT SERPL-CCNC: 18 U/L (ref 12–78)
ANION GAP SERPL CALC-SCNC: 6 MMOL/L (ref 5–15)
AST SERPL W P-5'-P-CCNC: 18 U/L (ref 15–37)
BILIRUB SERPL-MCNC: 0.5 MG/DL (ref 0.2–1)
BUN SERPL-MCNC: 17 MG/DL (ref 6–20)
BUN/CREAT SERPL: 15 (ref 12–20)
CA-I BLD-MCNC: 8.4 MG/DL (ref 8.5–10.1)
CHLORIDE SERPL-SCNC: 113 MMOL/L (ref 97–108)
CHOLEST SERPL-MCNC: 103 MG/DL
CO2 SERPL-SCNC: 22 MMOL/L (ref 21–32)
CREAT SERPL-MCNC: 1.15 MG/DL (ref 0.7–1.3)
ECHO AO ROOT DIAM: 3.2 CM
ECHO AO ROOT INDEX: 1.67 CM/M2
ECHO AV PEAK GRADIENT: 5 MMHG
ECHO AV PEAK VELOCITY: 1.1 M/S
ECHO AV VELOCITY RATIO: 1
ECHO LA DIAMETER INDEX: 1.98 CM/M2
ECHO LA DIAMETER: 3.8 CM
ECHO LA TO AORTIC ROOT RATIO: 1.19
ECHO LV E' SEPTAL VELOCITY: 6 CM/S
ECHO LV FRACTIONAL SHORTENING: 33 % (ref 28–44)
ECHO LV INTERNAL DIMENSION DIASTOLE INDEX: 2.03 CM/M2
ECHO LV INTERNAL DIMENSION DIASTOLIC: 3.9 CM (ref 4.2–5.9)
ECHO LV INTERNAL DIMENSION SYSTOLIC INDEX: 1.35 CM/M2
ECHO LV INTERNAL DIMENSION SYSTOLIC: 2.6 CM
ECHO LV IVSD: 1 CM (ref 0.6–1)
ECHO LV MASS 2D: 131 G (ref 88–224)
ECHO LV MASS INDEX 2D: 68.2 G/M2 (ref 49–115)
ECHO LV POSTERIOR WALL DIASTOLIC: 1.1 CM (ref 0.6–1)
ECHO LV RELATIVE WALL THICKNESS RATIO: 0.56
ECHO LVOT PEAK GRADIENT: 5 MMHG
ECHO LVOT PEAK VELOCITY: 1.1 M/S
ECHO MV A VELOCITY: 0.58 M/S
ECHO MV E DECELERATION TIME (DT): 192 MS
ECHO MV E VELOCITY: 0.68 M/S
ECHO MV E/A RATIO: 1.17
ECHO MV E/E' SEPTAL: 11.33
ECHO PV MAX VELOCITY: 0.7 M/S
ECHO PV PEAK GRADIENT: 2 MMHG
ECHO PVEIN A DURATION: 99 MS
ECHO PVEIN A VELOCITY: 0.3 M/S
ECHO RV INTERNAL DIMENSION: 3.4 CM
ERYTHROCYTE [DISTWIDTH] IN BLOOD BY AUTOMATED COUNT: 12.7 % (ref 11.5–14.5)
GLOBULIN SER CALC-MCNC: 3.1 G/DL (ref 2–4)
GLUCOSE BLD STRIP.AUTO-MCNC: 120 MG/DL (ref 65–117)
GLUCOSE BLD STRIP.AUTO-MCNC: 131 MG/DL (ref 65–117)
GLUCOSE BLD STRIP.AUTO-MCNC: 143 MG/DL (ref 65–117)
GLUCOSE BLD STRIP.AUTO-MCNC: 145 MG/DL (ref 65–117)
GLUCOSE SERPL-MCNC: 153 MG/DL (ref 65–100)
HCT VFR BLD AUTO: 37.5 % (ref 36.6–50.3)
HDLC SERPL-MCNC: 25 MG/DL
HDLC SERPL: 4.1 {RATIO} (ref 0–5)
HGB BLD-MCNC: 12.5 G/DL (ref 12.1–17)
LDLC SERPL CALC-MCNC: 48.4 MG/DL (ref 0–100)
LIPID PROFILE,FLP: NORMAL
MAGNESIUM SERPL-MCNC: 1.8 MG/DL (ref 1.6–2.4)
MCH RBC QN AUTO: 29.2 PG (ref 26–34)
MCHC RBC AUTO-ENTMCNC: 33.3 G/DL (ref 30–36.5)
MCV RBC AUTO: 87.6 FL (ref 80–99)
NRBC # BLD: 0 K/UL (ref 0–0.01)
NRBC BLD-RTO: 0 PER 100 WBC
PERFORMED BY, TECHID: ABNORMAL
PLATELET # BLD AUTO: 124 K/UL (ref 150–400)
PMV BLD AUTO: 13.6 FL (ref 8.9–12.9)
POTASSIUM SERPL-SCNC: 4.5 MMOL/L (ref 3.5–5.1)
PROT SERPL-MCNC: 5.6 G/DL (ref 6.4–8.2)
RBC # BLD AUTO: 4.28 M/UL (ref 4.1–5.7)
SODIUM SERPL-SCNC: 141 MMOL/L (ref 136–145)
TRIGL SERPL-MCNC: 148 MG/DL (ref ?–150)
VLDLC SERPL CALC-MCNC: 29.6 MG/DL
WBC # BLD AUTO: 5.7 K/UL (ref 4.1–11.1)

## 2022-06-12 PROCEDURE — 74011250636 HC RX REV CODE- 250/636: Performed by: INTERNAL MEDICINE

## 2022-06-12 PROCEDURE — 82962 GLUCOSE BLOOD TEST: CPT

## 2022-06-12 PROCEDURE — 74011250637 HC RX REV CODE- 250/637: Performed by: HOSPITALIST

## 2022-06-12 PROCEDURE — 74011636637 HC RX REV CODE- 636/637: Performed by: INTERNAL MEDICINE

## 2022-06-12 PROCEDURE — 92610 EVALUATE SWALLOWING FUNCTION: CPT

## 2022-06-12 PROCEDURE — 80053 COMPREHEN METABOLIC PANEL: CPT

## 2022-06-12 PROCEDURE — 65270000029 HC RM PRIVATE

## 2022-06-12 PROCEDURE — 80061 LIPID PANEL: CPT

## 2022-06-12 PROCEDURE — 93306 TTE W/DOPPLER COMPLETE: CPT

## 2022-06-12 PROCEDURE — 74011250637 HC RX REV CODE- 250/637: Performed by: INTERNAL MEDICINE

## 2022-06-12 PROCEDURE — 85027 COMPLETE CBC AUTOMATED: CPT

## 2022-06-12 PROCEDURE — 97161 PT EVAL LOW COMPLEX 20 MIN: CPT

## 2022-06-12 PROCEDURE — 74011250637 HC RX REV CODE- 250/637: Performed by: STUDENT IN AN ORGANIZED HEALTH CARE EDUCATION/TRAINING PROGRAM

## 2022-06-12 PROCEDURE — 83735 ASSAY OF MAGNESIUM: CPT

## 2022-06-12 PROCEDURE — 36415 COLL VENOUS BLD VENIPUNCTURE: CPT

## 2022-06-12 PROCEDURE — 74011000250 HC RX REV CODE- 250: Performed by: INTERNAL MEDICINE

## 2022-06-12 PROCEDURE — 97530 THERAPEUTIC ACTIVITIES: CPT

## 2022-06-12 PROCEDURE — 97165 OT EVAL LOW COMPLEX 30 MIN: CPT

## 2022-06-12 RX ORDER — LISINOPRIL 20 MG/1
20 TABLET ORAL DAILY
Status: DISCONTINUED | OUTPATIENT
Start: 2022-06-12 | End: 2022-06-14 | Stop reason: HOSPADM

## 2022-06-12 RX ORDER — LANOLIN ALCOHOL/MO/W.PET/CERES
6 CREAM (GRAM) TOPICAL
Status: DISCONTINUED | OUTPATIENT
Start: 2022-06-12 | End: 2022-06-14 | Stop reason: HOSPADM

## 2022-06-12 RX ORDER — HYDROCHLOROTHIAZIDE 25 MG/1
12.5 TABLET ORAL DAILY
Status: DISCONTINUED | OUTPATIENT
Start: 2022-06-12 | End: 2022-06-14 | Stop reason: HOSPADM

## 2022-06-12 RX ORDER — HYDROXYZINE PAMOATE 25 MG/1
25 CAPSULE ORAL ONCE
Status: COMPLETED | OUTPATIENT
Start: 2022-06-12 | End: 2022-06-12

## 2022-06-12 RX ADMIN — CYANOCOBALAMIN TAB 500 MCG 500 MCG: 500 TAB at 08:27

## 2022-06-12 RX ADMIN — CARBAMIDE PEROXIDE 6.5% 5 DROP: 6.5 LIQUID AURICULAR (OTIC) at 21:13

## 2022-06-12 RX ADMIN — ACETAMINOPHEN 650 MG: 325 TABLET ORAL at 05:18

## 2022-06-12 RX ADMIN — ENOXAPARIN SODIUM 40 MG: 100 INJECTION SUBCUTANEOUS at 08:27

## 2022-06-12 RX ADMIN — HYDROCHLOROTHIAZIDE 12.5 MG: 25 TABLET ORAL at 08:26

## 2022-06-12 RX ADMIN — EZETIMIBE 10 MG: 10 TABLET ORAL at 08:27

## 2022-06-12 RX ADMIN — DOCUSATE SODIUM 100 MG: 100 CAPSULE, LIQUID FILLED ORAL at 08:26

## 2022-06-12 RX ADMIN — ONDANSETRON 4 MG: 2 INJECTION INTRAMUSCULAR; INTRAVENOUS at 02:02

## 2022-06-12 RX ADMIN — MELATONIN TAB 3 MG 6 MG: 3 TAB at 02:01

## 2022-06-12 RX ADMIN — HYDROXYZINE PAMOATE 25 MG: 25 CAPSULE ORAL at 05:18

## 2022-06-12 RX ADMIN — SODIUM CHLORIDE, PRESERVATIVE FREE 10 ML: 5 INJECTION INTRAVENOUS at 20:49

## 2022-06-12 RX ADMIN — METOPROLOL SUCCINATE 100 MG: 50 TABLET, EXTENDED RELEASE ORAL at 08:27

## 2022-06-12 RX ADMIN — INSULIN LISPRO 2 UNITS: 100 INJECTION, SOLUTION INTRAVENOUS; SUBCUTANEOUS at 07:30

## 2022-06-12 RX ADMIN — LISINOPRIL 20 MG: 20 TABLET ORAL at 08:27

## 2022-06-12 RX ADMIN — SODIUM CHLORIDE, PRESERVATIVE FREE 10 ML: 5 INJECTION INTRAVENOUS at 14:46

## 2022-06-12 RX ADMIN — CARBAMIDE PEROXIDE 6.5% 5 DROP: 6.5 LIQUID AURICULAR (OTIC) at 08:32

## 2022-06-12 RX ADMIN — DULOXETINE HYDROCHLORIDE 20 MG: 20 CAPSULE, DELAYED RELEASE ORAL at 08:26

## 2022-06-12 RX ADMIN — INSULIN GLARGINE 24 UNITS: 100 INJECTION, SOLUTION SUBCUTANEOUS at 10:00

## 2022-06-12 RX ADMIN — INSULIN LISPRO 2 UNITS: 100 INJECTION, SOLUTION INTRAVENOUS; SUBCUTANEOUS at 11:30

## 2022-06-12 NOTE — PROGRESS NOTES
SPEECH LANGUAGE PATHOLOGY BEDSIDE SWALLOW EVALUATION  Patient: Ilir Perla (68 y.o. male)  Date: 6/12/2022  Primary Diagnosis: Acute cognitive decline [R41.89]       Precautions:     ASSESSMENT :  Based on the objective data described below, the patient presents with largely [facility] [place] swallow function. Oral phase c/b appropriate bolus acceptance, manipulation, and control. Min oral residue, which is cleared with liquid wash. Pharyngeal phase appears largely [facility] [place]. HLE adequate to palpation. Patient tolerated single and consecutive straw sips thin liquid, puree, and SBS with no overt s/s of pen/aspiration. Patient reports he consumes soft foods at home. Per H&P, patient with cognitive decline. Patient reports difficulty \"remembering some things\". PLAN :  Recommendations and Planned Interventions:  Recommend continue SBS, thin liquid diet. Adhere to aspiration precautions. Rec cognitive-linguistic eval to further assess concerns regarding worsening cognition. Frequency/Duration: ST not warranted for dysphagia at this time. Frequency/duration will be updated following cognitive-linguistic eval.   Discharge Recommendations: To Be Determined     SUBJECTIVE:   Patient is agreeable to beginning eval.    OBJECTIVE:     CT Results  (Last 48 hours)               06/11/22 1758  CT HEAD WO CONT Final result    Impression:  1. No evidence of acute intracranial hemorrhage or mass effect. 2.  Mild to moderate diffuse parenchymal volume loss without a lobar   predilection. Narrative:  CT HEAD WITHOUT IV CONTRAST       CLINICAL INDICATION: Cognitive decline over 3 weeks       TECHNIQUE: Routine axial images were obtained through the brain without the use   of IV contrast. Sagittal and coronal reformatted images were performed at the CT   console.  Dose reduction: Per department policy, all CT scans at this facility   are performed using dose reduction optimization techniques as appropriate to a   performed examination including the following: Automated exposure control,   adjustments of the mA and/or KV according to patient size, or use of iterative   reconstruction technique. COMPARISON: None. FINDINGS:        No evidence of acute intracranial hemorrhage or mass effect. Mild to moderate prominence of the extra-axial spaces, with commensurate   ventricular enlargement. No hydrocephalus. Portions of the posterior fossa not obscured by streak artifact are   unremarkable. Absent bilateral native lenses. Paranasal sinuses are predominantly clear. Tympanomastoid cavities are clear. No acute calvarial abnormality. Atherosclerotic calcification of the internal carotid arteries. Right TMJ arthrosis. Degenerative changes at the anterior arch of C1 and dens.                 Past Medical History:   Diagnosis Date    Cataract     Chronic kidney disease, stage 3 (HCC) 7/24/2019    stage 3a mild to moderate loss of kidney function    Coronary arteriosclerosis 6/29/2017    Coronary artery disease     Hyperlipidemia     Hypertension     Neuropathy     Type 2 diabetes mellitus ([facility] [address]     Vitamin D deficiency      Past Surgical History:   Procedure Laterality Date    HX CATARACT REMOVAL Bilateral     HX COLONOSCOPY  2005    HX KNEE REPLACEMENT Left      Prior Level of Function/Home Situation:   Home Situation  Home Environment: Private residence  One/Two Story Residence: One story  Living Alone: No  Support Systems: Child(zuleyma)  Patient Expects to be Discharged to[de-identified] Rehab hospital/unit acute  Current DME Used/Available at Home: Glucometer,Blood pressure cuff  Diet prior to admission: Soft, thin  Current Diet: SBS, thin liquid    Cognitive and Communication Status:  Neurologic State: Alert  Orientation Level: Oriented X4  Cognition: Follows commands    Swallowing Evaluation:   Oral Assessment:  Oral Assessment  Labial: No impairment  Dentition: Full;Natural  Oral Hygiene:  (Adequate)  Lingual: No impairment P.O. Trials:  Patient Position:  (HOB raised)  Vocal quality prior to P.O.: No impairment  Consistency Presented: Thin liquid;Puree;Mechanical soft  How Presented: Self-fed/presented; Successive swallows;Straw;Spoon  How Much:  (Multiple presentations)  Bolus Acceptance: No impairment  Bolus Formation/Control: No impairment  Propulsion: No impairment  Oral Residue: Less than 10% of bolus  Initiation of Swallow: No impairment  Laryngeal Elevation: Functional  Aspiration Signs/Symptoms: None  Pharyngeal Phase Characteristics: No impairment, issues, or problems   Oral Phase Severity: No impairment  Pharyngeal Phase Severity : No impairment     Voice:  Vocal Quality: No impairment    Pain: 0    After treatment:   Patient left in no apparent distress in bed, Call bell within reach and Nursing notified    COMMUNICATION/EDUCATION:   Patient was educated regarding purpose of SLP assessment, POC, diet recs and sw safety precautions. Patient demonstrated Good understanding as evidenced by verbal agreement. The patient's plan of care including recommendations, planned interventions, and recommended diet changes were discussed with: Registered nurse and Physician.      Thank you for this referral.  Mikey Lema M.S. CCC-SLP  Time Calculation: 14 mins

## 2022-06-12 NOTE — PROGRESS NOTES
Updated daughter on plan for MRI per Dr. Norma Lima. Daughter revealed patient has an appointment set for July 27 as outpatient to test for Parkinsons.

## 2022-06-12 NOTE — PROGRESS NOTES
[facility] Hospitalist Progress Note  Yovany Xiong MD  Date:2022       Room:[facility] [place]  Patient Name:Toni Nicholas     YOB: 1940     Age:81 y.o. 81M with past medical history of hypertension, coronary disease, dyslipidemia, diabetes on insulin, diabetic neuropathy, agent orange exposure.     3/17/22 he underwent a left total knee arthroplasty at [facility], followed by physical therapy and rehabilitation. In the postop course did quite well with recovery, started being more active and playing golf. At baseline he lives by himself and can drive and manage his own affairs.     22 presents to ED with rapid cognitive decline and physical debility with development over the previous few weeks. Difficulty in paying attention, difficulty remembering, not eating well, weight loss, difficulty in ambulation. During the ED course thought to be dehydrated and discharged home.     22 returns to hospital as family is unable to manage, with continued inability to eat, unable to ambulate, worsening cognition. Daughter reports she has an outpatient visit with neurology scheduled for next month but no prior formal neurological evaluation. I have been asked to admit to hospital for further stabilization and workup of his condition. 22 no new complaints, tolerating medications well. Needs assistance getting up to go to the toilet. Subjective    Subjective:  Symptoms:  Stable. Review of Systems   All other systems reviewed and are negative.     Objective         Vitals Last 24 Hours:  TEMPERATURE:  Temp  Av.7 °F (36.5 °C)  Min: 97.5 °F (36.4 °C)  Max: 97.9 °F (36.6 °C)  RESPIRATIONS RANGE: Resp  Av.2  Min: 18  Max: 19  PULSE OXIMETRY RANGE: SpO2  Av.8 %  Min: 96 %  Max: 98 %  PULSE RANGE: Pulse  Av.8  Min: 77  Max: 109  BLOOD PRESSURE RANGE: Systolic (55SNK), GCY:726 , Min:104 , JNX:250   ; Diastolic (12ZOS), BHR:37, Min:69, Max:90    I/O (24Hr): Intake/Output Summary (Last 24 hours) at 6/12/2022 1014  Last data filed at 6/11/2022 2215  Gross per 24 hour   Intake --   Output 250 ml   Net -250 ml     Objective:  General Appearance:  Comfortable. Vital signs: (most recent): Blood pressure 104/69, pulse 77, temperature 97.6 °F (36.4 °C), resp. rate 18, height 5' 8\" (1.727 m), weight 78.1 kg (172 lb 2.9 oz), SpO2 97 %. Vital signs are normal.    HEENT: Normal HEENT exam.    Lungs:  Normal effort. Heart: Normal rate. Regular rhythm. Abdomen: Abdomen is soft. Bowel sounds are normal.   There is no abdominal tenderness. Extremities: Normal range of motion. Neurological: Patient is alert and oriented to person, place and time. Skin:  Warm and dry. Labs/Imaging/Diagnostics    Labs:  CBC:  Recent Labs     06/12/22  0828 06/11/22  1541 06/09/22  1610   WBC 5.7 6.5 8.5   RBC 4.28 4.84 5.40   HGB 12.5 14.2 15.8   HCT 37.5 41.7 46.8   MCV 87.6 86.2 86.7   RDW 12.7 12.7 12.8   * 134* 112*     CHEMISTRIES:  Recent Labs     06/12/22  0828 06/11/22  1541 06/09/22  1610    137 134*   K 4.5 4.4 4.8   * 108 107   CO2 22 20* 16*   BUN 17 25* 44*   CA 8.4* 8.7 8.9   MG 1.8  --   --    PT/INR:No results for input(s): INR, INREXT in the last 72 hours. No lab exists for component: PROTIME  APTT:No results for input(s): APTT in the last 72 hours. LIVER PROFILE:  Recent Labs     06/12/22  0828 06/11/22  1541 06/09/22  1610   AST 18 20 23   ALT 18 17 17     Lab Results   Component Value Date/Time    ALT (SGPT) 18 06/12/2022 08:28 AM    AST (SGOT) 18 06/12/2022 08:28 AM    Alk.  phosphatase 61 06/12/2022 08:28 AM    Bilirubin, direct 0.2 06/09/2022 04:10 PM    Bilirubin, total 0.5 06/12/2022 08:28 AM       Imaging Last 24 Hours:  CT HEAD WO CONT    Result Date: 6/11/2022  CT HEAD WITHOUT IV CONTRAST CLINICAL INDICATION: Cognitive decline over 3 weeks TECHNIQUE: Routine axial images were obtained through the brain without the use of IV contrast. Sagittal and coronal reformatted images were performed at the CT console. Dose reduction: Per department policy, all CT scans at this facility are performed using dose reduction optimization techniques as appropriate to a performed examination including the following: Automated exposure control, adjustments of the mA and/or KV according to patient size, or use of iterative reconstruction technique. COMPARISON: None. FINDINGS: No evidence of acute intracranial hemorrhage or mass effect. Mild to moderate prominence of the extra-axial spaces, with commensurate ventricular enlargement. No hydrocephalus. Portions of the posterior fossa not obscured by streak artifact are unremarkable. Absent bilateral native lenses. Paranasal sinuses are predominantly clear. Tympanomastoid cavities are clear. No acute calvarial abnormality. Atherosclerotic calcification of the internal carotid arteries. Right TMJ arthrosis. Degenerative changes at the anterior arch of C1 and dens. 1.  No evidence of acute intracranial hemorrhage or mass effect. 2.  Mild to moderate diffuse parenchymal volume loss without a lobar predilection. Assessment//Plan   Active Problems:    Acute cognitive decline (6/11/2022)      Assessment & Plan    81M with past medical history of hypertension, coronary disease, dyslipidemia, diabetes on insulin, diabetic neuropathy, agent orange exposure.     3/17/22 he underwent a left total knee arthroplasty at [facility], followed by physical therapy and rehabilitation. In the postop course did quite well with recovery, started being more active and playing golf. At baseline he lives by himself and can drive and manage his own affairs.     6/9/22 presents to ED with rapid cognitive decline and physical debility with development over the previous few weeks. Difficulty in paying attention, difficulty remembering, not eating well, weight loss, difficulty in ambulation.  During the ED course thought to be dehydrated and discharged home.     6/11/22 returns to hospital as family is unable to manage, with continued inability to eat, unable to ambulate, worsening cognition. Daughter reports she has an outpatient visit with neurology scheduled for next month but no prior formal neurological evaluation. I have been asked to admit to hospital for further stabilization and workup of his condition. 6/12/22 no new complaints, tolerating medications well. Needs assistance getting up to go to the toilet. ASSESSMENT AND PLAN    1) Worsening cognitive and physical decline over the past three weeks.  Associated with difficulty remembering things, unable to ambulate, unable to care for himself, unable to eat.                 Admit to hospital              Telemetry monitoring              Neuro checks              Neurology              Physical therapy              Speech therapy     2) Cardiovascular issues including hypertension and dyslipidemia.                 Telemetry monitoring              Echocardiogram              Continue home meds                          Lisinopril HCTZ                          Metoprolol                          Ezetimibe     3) Diabetes mellitus                 Continue basal glargine              Sliding scale lispro ACHS              Hold metformin while in hospital              Duloxetine for diabetic neuropathy     4) DVT prophylaxis with enoxaparin    Electronically signed by Elysia Malik MD on 6/12/2022 at 10:14 AM

## 2022-06-12 NOTE — PROGRESS NOTES
PHYSICAL THERAPY EVALUATION  Patient: Augusto Martin (69 y.o. male)  Date: 6/12/2022  Primary Diagnosis: Acute cognitive decline [R41.89]        Precautions: standard     ASSESSMENT    Pt is a 79 y/o male admitted on 6/11 with acute cognitive decline and generalized weakness for failure to thrive. Of note, he was seen in the ED on 6/9/22 due to rapid decline in cognitive status and discharged. His daughter brought him back to ED with c/o patient not eating, becoming dehydrated, non ambulatory, and cognitive decline continued. Pt has PMH:of HTN, coronary disease, dyslipidemia,neuropathy, diabetes, agent orange exposure L TKA in March 2022. Pt received semi supine, awake, A&Ox 4 with vc's for year and agreeable for PT/OT eval/tx. Initially confused regarding situation. Per pt report, pt's son lives with him, though he works double shifts as a . Patient is primarily home alone. His daughter does not work and is \"a phone call away\" but does not check in daily per patient. He is in a 1 story home with 6 JESS and R HR. No falls. He drives, independently completes household tasks, and hired assist for yard work. He is independent at baseline for functional transfers/mobility and ADLs. Declines DME. Based on the objective data described below, the patient presents with generalized weakness, impaired functional mobility, impaired amb, impaired balance, and fatigue. Pt required supervision for bed mobility and supine <> sit. Vitals at edge of bed: 102/69 bpm, HR 81 and O2sat 95% on room air. PT provided SBA with sit <> stand transfers, Pt amb 100 feet with gt belt,no device. Patient was demonstrating slow gt pattern with slight WBOS but no major gait impairments noted. Patient stated he was tired and just want to go back to room and sleep due to fatigue. Vitals post session: 128/78, HR 78bpm O2sat 96% on room air. Pt did well with session today.  Pt will benefit from continued skilled PT to address above deficits and return to PLOF. Current PT DC recommendation home with home care services and family support. Current Level of Function Impacting Discharge (mobility/balance): requires supervision with mobility    Other factors to consider for discharge: will benefit from social support     PLAN :  Recommendations and Planned Interventions: bed mobility training, transfer training, and gait training      Recommend with staff: Ax1 supervision     Frequency/Duration: Patient will be followed by physical therapy:  2-3x/week to address goals.     Recommendation for discharge: (in order for the patient to meet his/her long term goals)  Home with Family Care    This discharge recommendation:  Has been made in collaboration with the attending provider and/or case management    IF patient discharges home will need the following DME: none         SUBJECTIVE:   Patient stated  I know where I am .    OBJECTIVE DATA SUMMARY:   HISTORY:    Past Medical History:   Diagnosis Date    Cataract     Chronic kidney disease, stage 3 ([facility] [address] 7/24/2019    stage 3a mild to moderate loss of kidney function    Coronary arteriosclerosis 6/29/2017    Coronary artery disease     Hyperlipidemia     Hypertension     Neuropathy     Type 2 diabetes mellitus ([facility] [address]     Vitamin D deficiency      Past Surgical History:   Procedure Laterality Date    HX CATARACT REMOVAL Bilateral     HX COLONOSCOPY  2005    HX KNEE REPLACEMENT Left        Home Situation  Home Environment: Private residence  # Steps to Enter: 6  Rails to Enter: Yes  Hand Rails : Right  Wheelchair Ramp: No  One/Two Story Residence: One story  Living Alone: No  Support Systems: Child(zuleyma)  Patient Expects to be Discharged to[de-identified] Home with family assistance  Current DME Used/Available at Home: None    EXAMINATION/PRESENTATION/DECISION MAKING:   Critical Behavior:  Neurologic State: Alert  Orientation Level: Oriented X4  Cognition: Follows commands  Safety/Judgement: Awareness of environment  Hearing: Auditory  Auditory Impairment: Hard of hearing, bilateral  Skin:  frail    Range Of Motion:  AROM: Within functional limits        Strength:    Strength: Within functional limits      4/5 throughout         Tone & Sensation:                  Coordination:  Coordination: Within functional limits  Vision:   Tracking: Able to track stimulus in all quadrants w/o difficulty  Functional Mobility:  Bed Mobility:  Rolling: Supervision  Supine to Sit: Supervision  Sit to Supine: Supervision  Scooting: Supervision  Transfers:  Sit to Stand: Stand-by assistance  Stand to Sit: Stand-by assistance                       Balance:   Sitting: Intact; With support  Standing: Intact; Without support  Ambulation/Gait Training:  Distance (ft): 100 Feet (ft)  Assistive Device: Gait belt  Ambulation - Level of Assistance: Stand-by assistance     Gait Description (WDL): Exceptions to WDL           Base of Support: Widened     Speed/Anneliese: Slow;Pace decreased (<100 feet/min)          Functional Measure:  [address] [place] [id number] AM-PAC 6 Clicks         Basic Mobility Inpatient Short Form  How much difficulty does the patient currently have. .. Unable A Lot A Little None   1. Turning over in bed (including adjusting bedclothes, sheets and blankets)? [] 1   [] 2   [] 3   [x] 4   2. Sitting down on and standing up from a chair with arms ( e.g., wheelchair, bedside commode, etc.)   [] 1   [] 2   [] 3   [x] 4   3. Moving from lying on back to sitting on the side of the bed? [] 1   [] 2   [] 3   [x] 4          How much help from another person does the patient currently need. .. Total A Lot A Little None   4. Moving to and from a bed to a chair (including a wheelchair)? [] 1   [] 2   [] 3   [x] 4   5. Need to walk in hospital room? [] 1   [] 2   [x] 3   [] 4   6. Climbing 3-5 steps with a railing? [] 1   [] 2   [x] 3   [] 4   © 2007, Trustees of [address] [place] [address] [address] [address], under license to [employer].  All rights reserved Score:  Initial: 22 Most Recent: X (Date: 22 )   Interpretation of Tool:  Represents activities that are increasingly more difficult (i.e. Bed mobility, Transfers, Gait). Score 24 23 22-20 19-15 14-10 9-7 6   Modifier CH CI CJ CK CL CM CN         Physical Therapy Evaluation Charge Determination   History Examination Presentation Decision-Making   LOW Complexity : Zero comorbidities / personal factors that will impact the outcome / POC LOW Complexity : 1-2 Standardized tests and measures addressing body structure, function, activity limitation and / or participation in recreation  LOW Complexity : Stable, uncomplicated  Other outcome measures [facility] 6        Based on the above components, the patient evaluation is determined to be of the following complexity level: LOW     Pain Ratin/10   Activity Tolerance:   Good    After treatment patient left in no apparent distress:   Supine in bed, Call bell within reach, and Bed / chair alarm activated and board updated. GOALS:  Pt stated goal: \" I want to get better\"  Pt will be I with LE HEP in 7 days. Pt will perform bed mobility independent  in 7 days. Pt will perform transfers with independent in 7 days. Pt will amb 200 feet with LRAD safely independently  in 7 days. Pt will ascend/descend 4 steps with B handrails and csv in 7 days to safely enter home    COMMUNICATION/EDUCATION:   The patients plan of care was discussed with: Physical therapist, Occupational therapist, and Registered nurse. Fall prevention education was provided and the patient/caregiver indicated understanding. and Patient/family have participated as able in goal setting and plan of care.     Thank you for this referral.  Radha Quiñonez, PT, DPT,    Time Calculation: 25 mins

## 2022-06-12 NOTE — PROGRESS NOTES
Maroon bruise on right forearm. Healed surgical scar on left knee. No wounds or breakdown noted. Skin intact.

## 2022-06-12 NOTE — PROGRESS NOTES
Problem: Self Care Deficits Care Plan (Adult)  Goal: *Acute Goals and Plan of Care (Insert Text)  Description: Pt will be independence sup<->sit in prep for EOB ADL's  Pt will be independence  toilet transfer with LRAD  Pt will be independence  toileting/cloth mgmt LRAD  Pt will be independence  grooming standing sink  Pt will be independence bathing sitting/standing sink LRAD  Pt will be [place] giuseppe UE HEP in prep for self care tasks    Outcome: Not Met   OCCUPATIONAL THERAPY EVALUATION  Patient: Isabella Montes (78 y.o. male)  Date: 6/12/2022  Primary Diagnosis: Acute cognitive decline [R41.89]        Precautions: falls       ASSESSMENT  Pt is a 79 y/o male came to [facility] with acute cognitive decline and generalized weakness and adm 6/11/22 for failure to thrive. Of note, he was seen in the ED on 6/9/22 due to rapid decline in cognitive status and discharged. His daughter brought him back to ED with c/o patient not eating, becoming dehydrated, non ambulatory, and cognitive decline continued. Orders for OT eval/tx. Pt has hx of HTN, coronary disease, dyslipidemia,neuropathy, diabetes, agent orange exposure L TKA in March 2022. Pt received semi supine, awake, A&Ox 4 with vc's for year and agreeable for OT/PT eval/tx. Initially confused regarding situation. Per pt report, pt's son lives with him, though he works double shifts as a . Patient is primarily home alone. His daughter does not work and is \"a phone call away\" but does not check in daily per patient. He is in a 1 story home with 6 JESS and R HR. No falls. He drives, independently completes household tasks, and hired assist for yard work. He is independent at baseline for functional transfers/mobility and ADLs. Declines DME. Pt currently presents with decreased insight into deficits, decreased awareness, and slight impulsivity. Patient follows commands appropriately and participates in eval as able.  Patient is SPV for all bed level mobility rolling, scooting, sup>sit. He impulsively attempts to stand initially, though returned to sitting EOB with min vc's. /69 sitting EOB. Oxygen >90. Patient independently doffed/donned giuseppe socks at EOB using crossed legs methods. Patient is SBA sit>stand and to ambulate in room/bathroom without device. SBA for toileting transfer and hygiene. Patient setup with oral hygiene supplies at sink. He performs oral hygiene independently with c/o nausea. Returned to bed with SBA and SPV to scoot to [facility]. Requesting \"sleep medicine\". RN aware. Patient left in supine with call bell in lap. Pt would benefit from skilled OT services while at [facility] in order to increase safety and independence with self care and functional transfers/mobility. Recommend discharge to home with 24/7 SPV and [address] [place] [address] when medically appropriate. He is not appropriate or safe to return home alone based on today's presentation due to insight, safety, and SBA required for functional mobility. Of note, concern for patient's failure to thrive regarding self feeding and hydration at home. Other factors to consider for discharge: pending social support, safety, insight into deficits, time since onset        PLAN :  Recommendations and Planned Interventions: functional mobility training, therapeutic exercise, patient education, home safety training, and family training/education    Frequency/Duration: Patient will be followed by occupational therapy 3-5x/week to address goals. Recommendation for discharge: (in order for the patient to meet his/her long term goals)  Home with [address] [facility] and 24/7 SPV    This discharge recommendation:  Has been made in collaboration with the attending provider and/or case management    IF patient discharges home will need the following DME: shower chair       SUBJECTIVE:   Patient stated It's 200 22 thousand.  No it's 2 - 0 - 2 - 2.    OBJECTIVE DATA SUMMARY:   HISTORY:   Past Medical History:   Diagnosis Date Cataract     Chronic kidney disease, stage 3 (HCC) 7/24/2019    stage 3a mild to moderate loss of kidney function    Coronary arteriosclerosis 6/29/2017    Coronary artery disease     Hyperlipidemia     Hypertension     Neuropathy     Type 2 diabetes mellitus ([address]     Vitamin D deficiency      Past Surgical History:   Procedure Laterality Date    HX CATARACT REMOVAL Bilateral     HX COLONOSCOPY  2005    HX KNEE REPLACEMENT Left        Expanded or extensive additional review of patient history:     Home Situation  Home Environment: Private residence  # Steps to Enter: 6  Rails to Enter: Yes  Hand Rails : Right  Wheelchair Ramp: No  One/Two Story Residence: One story  Living Alone: No  Support Systems: Child(zuleyma)  Patient Expects to be Discharged to[de-identified] Home  Current DME Used/Available at Home: None    PLOF: Pt IND for ADLS/IADLS, IND with mobility prior to admission. Hand dominance: Right    EXAMINATION OF PERFORMANCE DEFICITS:  Cognitive/Behavioral Status:  Neurologic State: Alert  Orientation Level: Oriented X4 (w/ increased time and efforts. vc's for error w/ year)  Cognition: Follows commands  Perception: Appears intact  Perseveration: No perseveration noted  Safety/Judgement: Awareness of environment;Decreased awareness of need for safety;Decreased insight into deficits; Fall prevention    Hearing: Auditory  Auditory Impairment: Hard of hearing, bilateral    Vision/Perceptual:    Tracking: Able to track stimulus in all quadrants w/o difficulty      Range of Motion:  AROM: Within functional limits    Strength:  Strength: Within functional limits    Coordination:  Coordination: Within functional limits  Fine Motor Skills-Upper: Right Intact; Left Intact      Balance:  Sitting: Intact; Without support  Standing: Intact; Without support    Functional Mobility and Transfers for ADLs:  Bed Mobility:  Rolling: Supervision  Supine to Sit: Supervision  Sit to Supine: Supervision  Scooting: Supervision    Transfers:  Sit to Stand: Stand-by assistance  Stand to Sit: Stand-by assistance  Bathroom Mobility: Stand-by assistance  Toilet Transfer : Stand-by assistance  Assistive Device : Gait Belt    ADL Assessment:  Feeding: Independent    Oral Facial Hygiene/Grooming: Independent    Lower Body Dressing: Independent    Toileting: Stand by assistance    ADL Intervention and task modifications:  Grooming  Position Performed: Standing  Brushing Teeth: Set-up; Independent    Lower Body Dressing Assistance  Socks: Independent  Leg Crossed Method Used: Yes  Position Performed: Seated edge of bed    Toileting  Toileting Assistance: Stand-by assistance  Clothing Management: Supervision    Cognitive Retraining  Safety/Judgement: Awareness of environment;Decreased awareness of need for safety;Decreased insight into deficits; [address]  AM-PACTM \"6 Clicks\"                                                       Daily Activity Inpatient Short Form  How much help from another person does the patient currently need. .. Total; A Lot A Little None   1. Putting on and taking off regular lower body clothing? []  1 []  2 []  3 [x]  4   2. Bathing (including washing, rinsing, drying)? []  1 []  2 []  3 [x]  4   3. Toileting, which includes using toilet, bedpan or urinal? [] 1 []  2 [x]  3 []  4   4. Putting on and taking off regular upper body clothing? []  1 []  2 []  3 [x]  4   5. Taking care of personal grooming such as brushing teeth? []  1 []  2 []  3 [x]  4   6. Eating meals? []  1 []  2 []  3 [x]  4   © 2007, [facility] of [address] [place] [address] [address] [id number], under license to [employer]. All rights reserved     Score: 23/24     Interpretation of Tool:  Represents clinically-significant functional categories (i.e. Activities of daily living).   Percentage of Impairment CH    0%   CI    1-19% CJ    20-39% CK    40-59% CL    60-79% CM    80-99% CN     100%   [facility]  Score 6-24 24 23 20-22 15-19 10-14 7-9 6        Occupational Therapy Evaluation Charge Determination   History Examination Decision-Making   LOW Complexity : Brief history review  LOW Complexity : 1-3 performance deficits relating to physical, cognitive , or psychosocial skils that result in activity limitations and / or participation restrictions  LOW Complexity : No comorbidities that affect functional and no verbal or physical assistance needed to complete eval tasks       Based on the above components, the patient evaluation is determined to be of the following complexity level: LOW   Pain Ratin/10    Activity Tolerance:   Good  Please refer to the flowsheet for vital signs taken during this treatment. After treatment patient left in no apparent distress:    Supine in bed, Call bell within reach, and Bed / chair alarm activated    COMMUNICATION/EDUCATION:   The patients plan of care was discussed with: Physical therapist and Registered nurse. PT/OT sessions occurred together for increased safety of pt and clinician. Patient/family have participated as able in goal setting and plan of care. This patients plan of care is appropriate for delegation to [facility] [place].     Thank you for this referral.  Ellen Adam OT  Time Calculation: 25 mins

## 2022-06-12 NOTE — CONSULTS
Consult Date: 2022    Consults Mr. Jakob Choi is a 78year old man with DM, HTN, s/p left knee surgery 3 months ago and was recovering well who himself reports 1.5-2 week history of confusion and anxiety. He is awake, alert and oriented this am. Labs WNL. C head showed no acute changes, does have generalized brain atrophy.      Subjective     Past Medical History:   Diagnosis Date    Cataract     Chronic kidney disease, stage 3 ([address] 2019    stage 3a mild to moderate loss of kidney function    Coronary arteriosclerosis 2017    Coronary artery disease     Hyperlipidemia     Hypertension     Neuropathy     Type 2 diabetes mellitus (HCC)     Vitamin D deficiency       Past Surgical History:   Procedure Laterality Date    HX CATARACT REMOVAL Bilateral     HX COLONOSCOPY      HX KNEE REPLACEMENT Left      Family History   Problem Relation Age of Onset    Cancer Mother         stomach    Heart Disease Father     Hypertension Father     Cancer Brother         brain tumor      Social History     Tobacco Use    Smoking status: Former Smoker     Packs/day: 2.00     Years: 20.00     Pack years: 40.00     Types: Cigarettes     Quit date:      Years since quittin.4    Smokeless tobacco: Never Used   Substance Use Topics    Alcohol use: No       Current Facility-Administered Medications   Medication Dose Route Frequency Provider Last Rate Last Admin    melatonin tablet 6 mg  6 mg Oral QHS PRN Reagan Polanco MD   6 mg at 22 0201    lisinopriL (PRINIVIL, ZESTRIL) tablet 20 mg  20 mg Oral DAILY Yovany Kumar MD   20 mg at 22 0827    And    hydroCHLOROthiazide (HYDRODIURIL) tablet 12.5 mg  12.5 mg Oral DAILY Ximena Hernandez MD   12.5 mg at 22 3689    docusate sodium (COLACE) capsule 100 mg  100 mg Oral DAILY Adelina Jung MD   100 mg at 22 0826    sodium chloride (NS) flush 5-40 mL  5-40 mL IntraVENous Q8H Ximena Hernandez MD   10 mL at 22 2157    sodium chloride (NS) flush 5-40 mL  5-40 mL IntraVENous PRN Gauri Miguel MD        acetaminophen (TYLENOL) tablet 650 mg  650 mg Oral Q6H PRN Gauri Miguel MD   650 mg at 06/12/22 0518    Or    acetaminophen (TYLENOL) suppository 650 mg  650 mg Rectal Q6H PRN Gauri Miguel MD        polyethylene glycol [facility]) packet 17 g  17 g Oral DAILY PRN Gauri Miguel MD        ondansetron [facility] ODT) tablet 4 mg  4 mg Oral Q8H PRN Gauri Miguel MD        Or    ondansetron [facility]) injection 4 mg  4 mg IntraVENous Q6H PRN Gauri Miguel MD   4 mg at 06/12/22 0202    enoxaparin (LOVENOX) injection 40 mg  40 mg SubCUTAneous DAILY Gauri Miguel MD   40 mg at 06/12/22 0827    carbamide peroxide (DEBROX) 6.5 % otic solution 5 Drop  5 Drop Both Ears BID Gauri Miguel MD   5 Drop at 06/12/22 1888    cyanocobalamin (VITAMIN B12) tablet 500 mcg  500 mcg Oral DAILY Gauri Miguel MD   500 mcg at 06/12/22 0827    DULoxetine (CYMBALTA) capsule 20 mg  20 mg Oral DAILY Gauri Miguel MD   20 mg at 06/12/22 5609    ezetimibe (ZETIA) tablet 10 mg  10 mg Oral DAILY Gauri Miguel MD   10 mg at 06/12/22 0827    insulin glargine (LANTUS) injection 24 Units  24 Units SubCUTAneous DAILY Gauri Miguel MD   24 Units at 06/12/22 1000    metoprolol succinate (TOPROL-XL) XL tablet 100 mg  100 mg Oral DAILY Gauri Miguel MD   100 mg at 06/12/22 0827    glucose chewable tablet 16 g  4 Tablet Oral PRN Gauri Miguel MD        dextrose 10% infusion 0-250 mL  0-250 mL IntraVENous PRN Gauri Miguel MD        glucagon [facility]) injection 1 mg  1 mg IntraMUSCular PRN Gauri Miguel MD        insulin lispro (HUMALOG) injection   SubCUTAneous AC&HS Gauri Miguel MD   2 Units at 06/12/22 0730        Review of Systems   All other systems reviewed and are negative.       Objective     Vital signs for last 24 hours:  Visit Vitals  /69 (BP 1 Location: Left upper arm, BP Patient Position: At rest;Supine) Pulse 77   Temp 97.6 °F (36.4 °C)   Resp 18   Ht 5' 8\" (1.727 m)   Wt 78.1 kg (172 lb 2.9 oz)   SpO2 97%   BMI 26.18 kg/m²       Intake/Output this shift:  Current Shift: No intake/output data recorded. Last 3 Shifts: 06/10 1901 - 06/12 0700  In: -   Out: 250 [Urine:250]    Data Review:   Recent Results (from the past 24 hour(s))   METABOLIC PANEL, COMPREHENSIVE    Collection Time: 06/11/22  3:41 PM   Result Value Ref Range    Sodium 137 136 - 145 mmol/L    Potassium 4.4 3.5 - 5.1 mmol/L    Chloride 108 97 - 108 mmol/L    CO2 20 (L) 21 - 32 mmol/L    Anion gap 9 5 - 15 mmol/L    Glucose 167 (H) 65 - 100 mg/dL    BUN 25 (H) 6 - 20 mg/dL    Creatinine 1.27 0.70 - 1.30 mg/dL    BUN/Creatinine ratio 20 12 - 20      GFR est AA >60 >60 ml/min/1.73m2    GFR est non-AA 54 (L) >60 ml/min/1.73m2    Calcium 8.7 8.5 - 10.1 mg/dL    Bilirubin, total 0.6 0.2 - 1.0 mg/dL    AST (SGOT) 20 15 - 37 U/L    ALT (SGPT) 17 12 - 78 U/L    Alk. phosphatase 70 45 - 117 U/L    Protein, total 6.6 6.4 - 8.2 g/dL    Albumin 3.0 (L) 3.5 - 5.0 g/dL    Globulin 3.6 2.0 - 4.0 g/dL    A-G Ratio 0.8 (L) 1.1 - 2.2     CBC WITH AUTOMATED DIFF    Collection Time: 06/11/22  3:41 PM   Result Value Ref Range    WBC 6.5 4.1 - 11.1 K/uL    RBC 4.84 4.10 - 5.70 M/uL    HGB 14.2 12.1 - 17.0 g/dL    HCT 41.7 36.6 - 50.3 %    MCV 86.2 80.0 - 99.0 FL    MCH 29.3 26.0 - 34.0 PG    MCHC 34.1 30.0 - 36.5 g/dL    RDW 12.7 11.5 - 14.5 %    PLATELET 657 (L) 633 - 400 K/uL    MPV 13.5 (H) 8.9 - 12.9 FL    NRBC 0.0 0.0  WBC    ABSOLUTE NRBC 0.00 0.00 - 0.01 K/uL    NEUTROPHILS 71 32 - 75 %    LYMPHOCYTES 16 12 - 49 %    MONOCYTES 11 5 - 13 %    EOSINOPHILS 2 0 - 7 %    BASOPHILS 0 0 - 1 %    IMMATURE GRANULOCYTES 0 0 - 0.5 %    ABS. NEUTROPHILS 4.6 1.8 - 8.0 K/UL    ABS. LYMPHOCYTES 1.0 0.8 - 3.5 K/UL    ABS. MONOCYTES 0.7 0.0 - 1.0 K/UL    ABS. EOSINOPHILS 0.1 0.0 - 0.4 K/UL    ABS. BASOPHILS 0.0 0.0 - 0.1 K/UL    ABS. IMM.  GRANS. 0.0 0.00 - 0.04 K/UL    DF AUTOMATED     GLUCOSE, POC    Collection Time: 06/11/22  7:03 PM   Result Value Ref Range    Glucose (POC) 140 (H) 65 - 117 mg/dL    Performed by Imani Siddiqi, POC    Collection Time: 06/11/22  9:42 PM   Result Value Ref Range    Glucose (POC) 192 (H) 65 - 117 mg/dL    Performed by STEFAN CHRISTINE    URINALYSIS W/ RFLX MICROSCOPIC    Collection Time: 06/11/22 10:10 PM   Result Value Ref Range    Color Yellow/Straw      Appearance Clear Clear      Specific gravity 1.006 1.003 - 1.030      pH (UA) 5.0 5.0 - 8.0      Protein Negative Negative mg/dL    Glucose Negative Negative mg/dL    Ketone 5 (A) Negative mg/dL    Bilirubin Negative Negative      Blood Negative Negative      Urobilinogen 0.1 0.1 - 1.0 EU/dL    Nitrites Negative Negative      Leukocyte Esterase Negative Negative      WBC 0-4 0 - 4 /hpf    RBC 0-5 0 - 5 /hpf    Bacteria 1+ (A) Negative /hpf    Mucus Trace (A) Negative /lpf   GLUCOSE, POC    Collection Time: 06/12/22  7:23 AM   Result Value Ref Range    Glucose (POC) 145 (H) 65 - 117 mg/dL    Performed by Pooja Ayers, COMPREHENSIVE    Collection Time: 06/12/22  8:28 AM   Result Value Ref Range    Sodium 141 136 - 145 mmol/L    Potassium 4.5 3.5 - 5.1 mmol/L    Chloride 113 (H) 97 - 108 mmol/L    CO2 22 21 - 32 mmol/L    Anion gap 6 5 - 15 mmol/L    Glucose 153 (H) 65 - 100 mg/dL    BUN 17 6 - 20 mg/dL    Creatinine 1.15 0.70 - 1.30 mg/dL    BUN/Creatinine ratio 15 12 - 20      GFR est AA >60 >60 ml/min/1.73m2    GFR est non-AA >60 >60 ml/min/1.73m2    Calcium 8.4 (L) 8.5 - 10.1 mg/dL    Bilirubin, total 0.5 0.2 - 1.0 mg/dL    AST (SGOT) 18 15 - 37 U/L    ALT (SGPT) 18 12 - 78 U/L    Alk.  phosphatase 61 45 - 117 U/L    Protein, total 5.6 (L) 6.4 - 8.2 g/dL    Albumin 2.5 (L) 3.5 - 5.0 g/dL    Globulin 3.1 2.0 - 4.0 g/dL    A-G Ratio 0.8 (L) 1.1 - 2.2     MAGNESIUM    Collection Time: 06/12/22  8:28 AM   Result Value Ref Range    Magnesium 1.8 1.6 - 2.4 mg/dL   CBC W/O DIFF    Collection Time: 06/12/22  8:28 AM   Result Value Ref Range    WBC 5.7 4.1 - 11.1 K/uL    RBC 4.28 4.10 - 5.70 M/uL    HGB 12.5 12.1 - 17.0 g/dL    HCT 37.5 36.6 - 50.3 %    MCV 87.6 80.0 - 99.0 FL    MCH 29.2 26.0 - 34.0 PG    MCHC 33.3 30.0 - 36.5 g/dL    RDW 12.7 11.5 - 14.5 %    PLATELET 644 (L) 755 - 400 K/uL    MPV 13.6 (H) 8.9 - 12.9 FL    NRBC 0.0 0.0  WBC    ABSOLUTE NRBC 0.00 0.00 - 0.01 K/uL   ECHO ADULT COMPLETE    Collection Time: 06/12/22  9:52 AM   Result Value Ref Range    AV Peak Velocity 1.1 m/s    AV Peak Gradient 5 mmHg    Aortic Root 3.2 cm    IVSd 1.0 0.6 - 1.0 cm    LVIDd 3.9 (A) 4.2 - 5.9 cm    LVIDs 2.6 cm    LVOT Peak Velocity 1.1 m/s    LVOT Peak Gradient 5 mmHg    LVPWd 1.1 (A) 0.6 - 1.0 cm    LV E' Septal Velocity 6 cm/s    LA Diameter 3.8 cm    MV E Wave Deceleration Time 192.0 ms    MV A Velocity 0.58 m/s    MV E Velocity 0.68 m/s    PV Max Velocity 0.7 m/s    PV Peak Gradient 2 mmHg    Pulm Vein A Duration 99.0 ms    Pulm Vein A Velocity 0.3 m/s    RVIDd 3.4 cm    Fractional Shortening 2D 33 28 - 44 %    LVIDd Index 2.03 cm/m2    LVIDs Index 1.35 cm/m2    LV RWT Ratio 0.56     LV Mass 2D 131.0 88 - 224 g    LV Mass 2D Index 68.2 49 - 115 g/m2    MV E/A 1.17     E/E' Septal 11.33     LA Size Index 1.98 cm/m2    LA/AO Root Ratio 1.19     Ao Root Index 1.67 cm/m2    AV Velocity Ratio 1.00        Physical Exam    Neuro Physical Exam      General: Well developed, well nourished. Patient in no apparent distress. Cardiac: Regular rate and rhythm       Neurological Exam:  Mental Status: Awake, alert, oriented x4, normal speech   Cranial Nerves:   Intact visual fields. PERRL, EOM's full, no nystagmus, no ptosis. Facial sensation is normal. Facial movement is symmetric. Palate is midline. Normal sternocleidomastoid strength. Tongue is midline. Hearing is intact bilaterally. Motor:  5/5 strength in upper and lower proximal and distal muscles. Normal bulk and tone. Reflexes:   Deep tendon reflexes 2+/4 and symmetrical.   Sensory:   Normal to light touch throughout   Gait:  defferred   Tremor:   No tremor noted. Cerebellar:  No cerebellar signs present. Babinski:      Down b/l     Assessment and Plan: Mr. Rene Martel is a 78year old man with some non specific decline in mental status. He is awake and oriented, now back to baseline. It seems this has come on suddenly. Will do brain mri w/o contrast to rule out acute stroke.

## 2022-06-13 ENCOUNTER — APPOINTMENT (OUTPATIENT)
Dept: MRI IMAGING | Age: 82
DRG: 884 | End: 2022-06-13
Attending: PSYCHIATRY & NEUROLOGY
Payer: MEDICARE

## 2022-06-13 LAB
GLUCOSE BLD STRIP.AUTO-MCNC: 119 MG/DL (ref 65–117)
GLUCOSE BLD STRIP.AUTO-MCNC: 122 MG/DL (ref 65–117)
GLUCOSE BLD STRIP.AUTO-MCNC: 85 MG/DL (ref 65–117)
GLUCOSE BLD STRIP.AUTO-MCNC: 95 MG/DL (ref 65–117)
PERFORMED BY, TECHID: ABNORMAL
PERFORMED BY, TECHID: ABNORMAL
PERFORMED BY, TECHID: NORMAL
PERFORMED BY, TECHID: NORMAL

## 2022-06-13 PROCEDURE — 70551 MRI BRAIN STEM W/O DYE: CPT

## 2022-06-13 PROCEDURE — 97116 GAIT TRAINING THERAPY: CPT

## 2022-06-13 PROCEDURE — 97110 THERAPEUTIC EXERCISES: CPT

## 2022-06-13 PROCEDURE — 74011250637 HC RX REV CODE- 250/637: Performed by: INTERNAL MEDICINE

## 2022-06-13 PROCEDURE — 74011636637 HC RX REV CODE- 636/637: Performed by: INTERNAL MEDICINE

## 2022-06-13 PROCEDURE — 74011000250 HC RX REV CODE- 250: Performed by: INTERNAL MEDICINE

## 2022-06-13 PROCEDURE — 82962 GLUCOSE BLOOD TEST: CPT

## 2022-06-13 PROCEDURE — 74011250636 HC RX REV CODE- 250/636: Performed by: INTERNAL MEDICINE

## 2022-06-13 PROCEDURE — 74011250637 HC RX REV CODE- 250/637: Performed by: HOSPITALIST

## 2022-06-13 PROCEDURE — 74011250637 HC RX REV CODE- 250/637: Performed by: STUDENT IN AN ORGANIZED HEALTH CARE EDUCATION/TRAINING PROGRAM

## 2022-06-13 PROCEDURE — 65270000029 HC RM PRIVATE

## 2022-06-13 RX ORDER — LORAZEPAM 1 MG/1
1 TABLET ORAL ONCE
Status: COMPLETED | OUTPATIENT
Start: 2022-06-13 | End: 2022-06-13

## 2022-06-13 RX ADMIN — ENOXAPARIN SODIUM 40 MG: 100 INJECTION SUBCUTANEOUS at 12:01

## 2022-06-13 RX ADMIN — DULOXETINE HYDROCHLORIDE 20 MG: 20 CAPSULE, DELAYED RELEASE ORAL at 12:01

## 2022-06-13 RX ADMIN — LISINOPRIL 20 MG: 20 TABLET ORAL at 12:01

## 2022-06-13 RX ADMIN — INSULIN GLARGINE 24 UNITS: 100 INJECTION, SOLUTION SUBCUTANEOUS at 12:01

## 2022-06-13 RX ADMIN — SODIUM CHLORIDE, PRESERVATIVE FREE 5 ML: 5 INJECTION INTRAVENOUS at 14:00

## 2022-06-13 RX ADMIN — LORAZEPAM 1 MG: 1 TABLET ORAL at 12:09

## 2022-06-13 RX ADMIN — METOPROLOL SUCCINATE 100 MG: 50 TABLET, EXTENDED RELEASE ORAL at 12:05

## 2022-06-13 RX ADMIN — SODIUM CHLORIDE, PRESERVATIVE FREE 10 ML: 5 INJECTION INTRAVENOUS at 05:26

## 2022-06-13 RX ADMIN — DOCUSATE SODIUM 100 MG: 100 CAPSULE, LIQUID FILLED ORAL at 12:01

## 2022-06-13 RX ADMIN — SODIUM CHLORIDE, PRESERVATIVE FREE 10 ML: 5 INJECTION INTRAVENOUS at 22:59

## 2022-06-13 RX ADMIN — HYDROCHLOROTHIAZIDE 12.5 MG: 25 TABLET ORAL at 12:01

## 2022-06-13 RX ADMIN — CARBAMIDE PEROXIDE 6.5% 5 DROP: 6.5 LIQUID AURICULAR (OTIC) at 22:59

## 2022-06-13 RX ADMIN — CYANOCOBALAMIN TAB 500 MCG 500 MCG: 500 TAB at 12:01

## 2022-06-13 RX ADMIN — EZETIMIBE 10 MG: 10 TABLET ORAL at 12:01

## 2022-06-13 NOTE — PROGRESS NOTES
Hospitalist Progress Note               Daily Progress Note: 6/13/2022      Subjective:   Hospital course to date:  78M with past medical history of hypertension, coronary disease, dyslipidemia, diabetes on insulin, diabetic neuropathy, agent orange exposure.     3/17/22 he underwent a left total knee arthroplasty at [facility], followed by physical therapy and rehabilitation. In the postop course did quite well with recovery, started being more active and playing golf. At baseline he lives by himself and can drive and manage his own affairs.     6/9/22 presents to ED with rapid cognitive decline and physical debility with development over the previous few weeks. Difficulty in paying attention, difficulty remembering, not eating well, weight loss, difficulty in ambulation. During the ED course thought to be dehydrated and discharged home.     6/11/22 returns to hospital as family is unable to manage, with continued inability to eat, unable to ambulate, worsening cognition. Daughter reports she has an outpatient visit with neurology scheduled for next month but no prior formal neurological evaluation. I have been asked to admit to hospital for further stabilization and workup of his condition.     6/12/22 no new complaints, tolerating medications well. Needs assistance getting up to go to the toilet. Patient was seen by neurology who is ordered an MRI of the brain.     Patient now appears to be back to baseline    Problem List:  Problem List as of 6/13/2022 Date Reviewed: 4/12/2022          Codes Class Noted - Resolved    Acute cognitive decline ICD-10-CM: R41.89  ICD-9-CM: 799.52  6/11/2022 - Present        Opioid use, unspecified with unspecified opioid-induced disorder ICD-10-CM: F11.99  ICD-9-CM: 292.9, 305.50  3/24/2022 - Present        OA (osteoarthritis) of knee ICD-10-CM: M17.10  ICD-9-CM: 715.36  3/17/2022 - Present        S/P total knee replacement, left ICD-10-CM: R78.015  ICD-9-CM: V43.65  3/17/2022 - Present        Osteoarthritis of left knee ICD-10-CM: M17.12  ICD-9-CM: 715.96  10/7/2021 - Present    Overview Signed 10/7/2021  2:27 PM by Nicole Gamez MD     Follows with orthopedic surgery             Polyneuropathy, unspecified ICD-10-CM: G62.9  ICD-9-CM: 356.9  10/10/2020 - Present        Hereditary and idiopathic neuropathy, unspecified ICD-10-CM: G60.9  ICD-9-CM: 356.9  7/29/2020 - Present        Vitamin D deficiency ICD-10-CM: E55.9  ICD-9-CM: 268.9  7/29/2020 - Present        Chronic kidney disease, stage 3 ([facility] ICD-10-CM: N18.30  ICD-9-CM: 585.3  7/24/2019 - Present    Overview Signed 3/16/2021  9:26 AM by Luz Elena Oviedo CMA     stage 3a mild to moderate loss of kidney function             Type 2 diabetes mellitus with hyperglycemia, without long-term current use of insulin ([facility] ICD-10-CM: E11.65  ICD-9-CM: 250.00, 790.29  3/3/2019 - Present        Essential hypertension ICD-10-CM: I10  ICD-9-CM: 401.9  3/3/2019 - Present        Mixed hyperlipidemia ICD-10-CM: E78.2  ICD-9-CM: 272.2  3/3/2019 - Present        Coronary arteriosclerosis ICD-10-CM: I25.10  ICD-9-CM: 414.00  6/29/2017 - Present              Medications reviewed  Current Facility-Administered Medications   Medication Dose Route Frequency    melatonin tablet 6 mg  6 mg Oral QHS PRN    lisinopriL (PRINIVIL, ZESTRIL) tablet 20 mg  20 mg Oral DAILY    And    hydroCHLOROthiazide (HYDRODIURIL) tablet 12.5 mg  12.5 mg Oral DAILY    docusate sodium (COLACE) capsule 100 mg  100 mg Oral DAILY    sodium chloride (NS) flush 5-40 mL  5-40 mL IntraVENous Q8H    sodium chloride (NS) flush 5-40 mL  5-40 mL IntraVENous PRN    acetaminophen (TYLENOL) tablet 650 mg  650 mg Oral Q6H PRN    Or    acetaminophen (TYLENOL) suppository 650 mg  650 mg Rectal Q6H PRN    polyethylene glycol (MIRALAX) packet 17 g  17 g Oral DAILY PRN    ondansetron (ZOFRAN ODT) tablet 4 mg  4 mg Oral Q8H PRN    Or    ondansetron [facility]) injection 4 mg  4 mg IntraVENous Q6H PRN    enoxaparin (LOVENOX) injection 40 mg  40 mg SubCUTAneous DAILY    carbamide peroxide (DEBROX) 6.5 % otic solution 5 Drop  5 Drop Both Ears BID    cyanocobalamin (VITAMIN B12) tablet 500 mcg  500 mcg Oral DAILY    DULoxetine (CYMBALTA) capsule 20 mg  20 mg Oral DAILY    ezetimibe (ZETIA) tablet 10 mg  10 mg Oral DAILY    insulin glargine (LANTUS) injection 24 Units  24 Units SubCUTAneous DAILY    metoprolol succinate (TOPROL-XL) XL tablet 100 mg  100 mg Oral DAILY    glucose chewable tablet 16 g  4 Tablet Oral PRN    dextrose 10% infusion 0-250 mL  0-250 mL IntraVENous PRN    glucagon (GLUCAGEN) injection 1 mg  1 mg IntraMUSCular PRN    insulin lispro (HUMALOG) injection   SubCUTAneous AC&HS       Review of Systems:   A comprehensive review of systems was negative except for that written in the HPI. Objective:   Physical Exam:     Visit Vitals  /77   Pulse 76   Temp 97.7 °F (36.5 °C)   Resp 18   Ht 5' 8\" (1.727 m)   Wt 82.3 kg (181 lb 7 oz)   SpO2 96%   BMI 27.59 kg/m²      O2 Device: None (Room air)    Temp (24hrs), Av.8 °F (36.6 °C), Min:97.7 °F (36.5 °C), Max:98 °F (36.7 °C)    No intake/output data recorded.  1901 -  0700  In: -   Out: 250 [Urine:250]    General:   Awake and alert   Lungs:   Clear to auscultation bilaterally. Chest wall:  No tenderness or deformity. Heart:  Regular rate and rhythm, S1, S2 normal, no murmur, click, rub or gallop. Abdomen:   Soft, non-tender. Bowel sounds normal. No masses,  No organomegaly. Extremities: Extremities normal, atraumatic, no cyanosis or edema. Pulses: 2+ and symmetric all extremities. Skin: Skin color, texture, turgor normal. No rashes or lesions   Neurologic: CNII-XII intact.   No gross focal deficits         Data Review:       Recent Days:  Recent Labs     22  0828 22  1541   WBC 5.7 6.5   HGB 12.5 14.2   HCT 37.5 41.7   * 134*     Recent Labs 06/12/22  0828 06/11/22  1541    137   K 4.5 4.4   * 108   CO2 22 20*   * 167*   BUN 17 25*   CREA 1.15 1.27   CA 8.4* 8.7   MG 1.8  --    ALB 2.5* 3.0*   TBILI 0.5 0.6   ALT 18 17     No results for input(s): PH, PCO2, PO2, HCO3, FIO2 in the last 72 hours. 24 Hour Results:  Recent Results (from the past 24 hour(s))   ECHO ADULT COMPLETE    Collection Time: 06/12/22  9:52 AM   Result Value Ref Range    AV Peak Velocity 1.1 m/s    AV Peak Gradient 5 mmHg    Aortic Root 3.2 cm    IVSd 1.0 0.6 - 1.0 cm    LVIDd 3.9 (A) 4.2 - 5.9 cm    LVIDs 2.6 cm    LVOT Peak Velocity 1.1 m/s    LVOT Peak Gradient 5 mmHg    LVPWd 1.1 (A) 0.6 - 1.0 cm    LV E' Septal Velocity 6 cm/s    LA Diameter 3.8 cm    MV E Wave Deceleration Time 192.0 ms    MV A Velocity 0.58 m/s    MV E Velocity 0.68 m/s    PV Max Velocity 0.7 m/s    PV Peak Gradient 2 mmHg    Pulm Vein A Duration 99.0 ms    Pulm Vein A Velocity 0.3 m/s    RVIDd 3.4 cm    Fractional Shortening 2D 33 28 - 44 %    LVIDd Index 2.03 cm/m2    LVIDs Index 1.35 cm/m2    LV RWT Ratio 0.56     LV Mass 2D 131.0 88 - 224 g    LV Mass 2D Index 68.2 49 - 115 g/m2    MV E/A 1.17     E/E' Septal 11.33     LA Size Index 1.98 cm/m2    LA/AO Root Ratio 1.19     Ao Root Index 1.67 cm/m2    AV Velocity Ratio 1.00    GLUCOSE, POC    Collection Time: 06/12/22 11:47 AM   Result Value Ref Range    Glucose (POC) 143 (H) 65 - 117 mg/dL    Performed by Lawrence Walker, POC    Collection Time: 06/12/22  3:02 PM   Result Value Ref Range    Glucose (POC) 120 (H) 65 - 117 mg/dL    Performed by C/Davion Font Final, POC    Collection Time: 06/12/22  6:54 PM   Result Value Ref Range    Glucose (POC) 131 (H) 65 - 117 mg/dL    Performed by Karen Chávez    GLUCOSE, POC    Collection Time: 06/13/22  8:18 AM   Result Value Ref Range    Glucose (POC) 119 (H) 65 - 117 mg/dL    Performed by Karina Casey        CT HEAD WO CONT   Final Result   1.   No evidence of acute intracranial hemorrhage or mass effect. 2.  Mild to moderate diffuse parenchymal volume loss without a lobar   predilection. MRI BRAIN WO CONT    (Results Pending)        Assessment:  Acute cognitive and physical decline over several weeks, etiology unclear. Mental status has improved    Diabetes mellitus type 2    Essential hypertension    Dyslipidemia      Plan:  Await MRI  Therapy recommending home with home health    Potential discharge home later today      Care Plan discussed with: Patient/Family    Disposition: Home with home health    Total time spent with patient: 30 minutes.     Fliemon Thompson MD

## 2022-06-13 NOTE — PROGRESS NOTES
CM reviewed chart and spoke to primary physician. Patient being recommended for [facility] with 24/7 supervision. CM called patient's daughter to discuss this. She states that patient does not have 24/7 supervision. Previous CM had sent referral to IRF and daughter would still like to pursue IRF. CM spoke to [facility] liaison. They will need a diagnosis for IRF for patient to qualify. CM explained this to patient's daughter. CM will follow up with [facility] after MRI has been completed.

## 2022-06-13 NOTE — PROGRESS NOTES
PHYSICAL THERAPY TREATMENT  Patient: Breonna Kaminski (22 y.o. male)  Date: 6/13/2022  Diagnosis: Acute cognitive decline [R41.89] <principal problem not specified>       Precautions:    Chart, physical therapy assessment, plan of care and goals were reviewed. ASSESSMENT  Patient continues with skilled PT services and is progressing towards goals. Pt semi supine in bed upon arrival and agreeable to session. Performed bed mob with supervision. Performed STS from EOB with SBA. Increased amb distance with gait belt and no AD. Pt gait is slightly unsteady with no overt LOB. Noted minor buckling in L knee. Pt returned to seated position at EOB. Completed seated therex, see details below. Pt returned to semi supine position with supervision. Pt left with call bell in reach and needs met. Rec d/c home with HHPT with 24/7 care. Current Level of Function Impacting Discharge (mobility/balance): CGA/SBA for safety    Other factors to consider for discharge: PLOF, time since onset, severity of deficits          PLAN :  Patient continues to benefit from skilled intervention to address the above impairments. Continue treatment per established plan of care. to address goals.     Recommendation for discharge: (in order for the patient to meet his/her long term goals)  HHPT with 24/7 care     This discharge recommendation:  Has been made in collaboration with the attending provider and/or case management    IF patient discharges home will need the following DME: to be determined (TBD)       SUBJECTIVE:   Patient stated I feel like therapy is good for me    OBJECTIVE DATA SUMMARY:   Critical Behavior:  Neurologic State: Alert  Orientation Level: Oriented X4  Cognition: Follows commands  Safety/Judgement: Awareness of environment  Functional Mobility Training:  Bed Mobility:  Rolling: Supervision  Supine to Sit: Supervision  Sit to Supine: Supervision  Scooting: Supervision    Transfers:  Sit to Stand: Stand-by assistance  Stand to Sit: Stand-by assistance    Balance:  Sitting: Intact; Without support  Standing: Impaired; Without support  Standing - Static: Good; Unsupported  Standing - Dynamic : Unsupported; Fair    Ambulation/Gait Training:  Distance (ft): 150 Feet (ft)  Assistive Device: Gait belt  Ambulation - Level of Assistance: Contact guard assistance  Base of Support: Widened  Speed/Anneliese: Slow      Therapeutic Exercises:   1 x 20 AP  1 x 12 LAQ  1 x 12 Marches  1 x 12 hip ab/ad     Pain Ratin/10    Activity Tolerance:   Fair and requires rest breaks  Please refer to the flowsheet for vital signs taken during this treatment. After treatment patient left in no apparent distress:   Supine in bed, Call bell within reach, Bed / chair alarm activated, and Side rails x 3    COMMUNICATION/COLLABORATION:   The patients plan of care was discussed with: Registered nurse. Problem: Mobility Impaired (Adult and Pediatric)  Goal: *Acute Goals and Plan of Care (Insert Text)  Description: Pt stated goal: \" I want to get better\"  Pt will be I with LE HEP in 7 days. Pt will perform bed mobility independent  in 7 days. Pt will perform transfers with independent in 7 days. Pt will amb 200 feet with LRAD safely independently  in 7 days.    Pt will ascend/descend 4 steps with B handrails and csv in 7 days to safely enter home  Outcome: Progressing Towards Goal       Mathieu Hagen PTA   Time Calculation: 27 mins

## 2022-06-13 NOTE — DISCHARGE SUMMARY
Physician Discharge Summary     Patient ID:    Oliver Gregory  [id number]  9 y.o.  1940    Admit date: 6/11/2022    Discharge date : 6/13/2022    Chronic Diagnoses:    Problem List as of 6/13/2022 Date Reviewed: 4/12/2022          Codes Class Noted - Resolved    Acute cognitive decline ICD-10-CM: R41.89  ICD-9-CM: 799.52  6/11/2022 - Present        Opioid use, unspecified with unspecified opioid-induced disorder ICD-10-CM: F11.99  ICD-9-CM: 292.9, 305.50  3/24/2022 - Present        OA (osteoarthritis) of knee ICD-10-CM: M17.10  ICD-9-CM: 715.36  3/17/2022 - Present        S/P total knee replacement, left ICD-10-CM: D02.027  ICD-9-CM: V43.65  3/17/2022 - Present        Osteoarthritis of left knee ICD-10-CM: M17.12  ICD-9-CM: 715.96  10/7/2021 - Present    Overview Signed 10/7/2021  2:27 PM by Davy Tam MD     Follows with orthopedic surgery             Polyneuropathy, unspecified ICD-10-CM: G62.9  ICD-9-CM: 356.9  10/10/2020 - Present        Hereditary and idiopathic neuropathy, unspecified ICD-10-CM: G60.9  ICD-9-CM: 356.9  7/29/2020 - Present        Vitamin D deficiency ICD-10-CM: E55.9  ICD-9-CM: 268.9  7/29/2020 - Present        Chronic kidney disease, stage 3 ([facility] ICD-10-CM: N18.30  ICD-9-CM: 585.3  7/24/2019 - Present    Overview Signed 3/16/2021  9:26 AM by Helen Stokes CMA     stage 3a mild to moderate loss of kidney function             Type 2 diabetes mellitus with hyperglycemia, without long-term current use of insulin ([facility] ICD-10-CM: E11.65  ICD-9-CM: 250.00, 790.29  3/3/2019 - Present        Essential hypertension ICD-10-CM: I10  ICD-9-CM: 401.9  3/3/2019 - Present        Mixed hyperlipidemia ICD-10-CM: E78.2  ICD-9-CM: 272.2  3/3/2019 - Present        Coronary arteriosclerosis ICD-10-CM: I25.10  ICD-9-CM: 414.00  6/29/2017 - Present          22    Final Diagnoses:   Acute cognitive decline [R41.89]  Stroke ruled out  Diabetes mellitus type 2  Essential hypertension  Dyslipidemia    Reason for Hospitalization:  Hospital course to date:  78M with past medical history of hypertension, coronary disease, dyslipidemia, diabetes on insulin, diabetic neuropathy, agent orange exposure.     3/17/22 he underwent a left total knee arthroplasty at [facility], followed by physical therapy and rehabilitation. In the postop course did quite well with recovery, started being more active and playing golf. At baseline he lives by himself and can drive and manage his own affairs.     6/9/22 presents to ED with rapid cognitive decline and physical debility with development over the previous few weeks. Difficulty in paying attention, difficulty remembering, not eating well, weight loss, difficulty in ambulation. During the ED course thought to be dehydrated and discharged home.     6/11/22 returns to hospital as family is unable to manage, with continued inability to eat, unable to ambulate, worsening cognition. Daughter reports she has an outpatient visit with neurology scheduled for next month but no prior formal neurological evaluation. I have been asked to admit to hospital for further stabilization and workup of his condition.         Hospital Course:   Patient was admitted to the medical floor. He was seen in consultation by neurology. MRI of the brain was obtained which showed no acute abnormality    His mental status returned to baseline    PT and OT felt he was stable for discharge home with home health              Discharge Medications:   Current Discharge Medication List      CONTINUE these medications which have NOT CHANGED    Details   carbamide peroxide (Debrox) 6.5 % otic solution Administer 5 Drops into each ear two (2) times a day.   Qty: 7.5 mL, Refills: 0      lisinopril-hydroCHLOROthiazide (PRINZIDE, ZESTORETIC) 20-12.5 mg per tablet TAKE 1 TABLET BY MOUTH DAILY  Qty: 90 Tablet, Refills: 3    Associated Diagnoses: Type 2 diabetes mellitus with hyperglycemia, without long-term current use of insulin ([facility])      insulin glargine (Lantus Solostar U-100 Insulin) 100 unit/mL (3 mL) inpn 24 Units by SubCUTAneous route nightly. Qty: 30 mL, Refills: 3    Associated Diagnoses: Type 2 diabetes mellitus with hyperglycemia, without long-term current use of insulin ([facility])      metoprolol succinate (TOPROL-XL) 100 mg tablet Take 100 mg by mouth daily. ondansetron (ZOFRAN ODT) 4 mg disintegrating tablet Take 1 Tablet by mouth every eight (8) hours as needed for Nausea, Vomiting or Nausea or Vomiting for up to 20 doses. Qty: 20 Tablet, Refills: 0      metFORMIN ER (GLUCOPHAGE XR) 500 mg tablet Take 2 Tablets by mouth two (2) times a day. Qty: 360 Tablet, Refills: 3    Associated Diagnoses: Type 2 diabetes mellitus with hyperglycemia, without long-term current use of insulin ([facility])      Accu-Chek Patricia Plus test strp strip USE TO TEST BLOOD SUGAR TWICE DAILY  Qty: 100 Strip, Refills: 5    Associated Diagnoses: Type 2 diabetes mellitus with hyperglycemia, without long-term current use of insulin ([facility])      ezetimibe (ZETIA) 10 mg tablet TAKE 1 TABLET BY MOUTH DAILY  Qty: 90 Tablet, Refills: 1    Associated Diagnoses: Mixed hyperlipidemia; Coronary arteriosclerosis      lancets misc USE TO TEST BLOOD SUGAR TWICE DAILY Dx Code: E11.65  Qty: 200 Each, Refills: 3    Associated Diagnoses: Type 2 diabetes mellitus with hyperglycemia, without long-term current use of insulin ([facility])      cyanocobalamin (VITAMIN B12) 500 mcg tablet Take 500 mcg by mouth daily. DULoxetine (CYMBALTA) 20 mg capsule TAKE 1 CAPSULE BY MOUTH EVERY DAY  Qty: 90 Cap, Refills: 0      Insulin Needles, Disposable, (ALICE PEN NEEDLE) 32 gauge x 5/32\" ndle Use to inject Lantus nightly.  DX Code: E11.65  Qty: 100 Pen Needle, Refills: 3    Associated Diagnoses: Type 2 diabetes mellitus with hyperglycemia, without long-term current use of insulin ([facility])      ergocalciferol (VITAMIN D2) 50,000 unit capsule Take 5,000 Units by mouth every seven (7) days. Follow up Care:    1. Nicole Gamez MD in 1-2 weeks. Please call to set up an appointment shortly after discharge. Diet:  Regular Diet    Disposition:  Home. Advanced Directive:   FULL    DNR      Discharge Exam:  General:  Alert, cooperative, no distress, appears stated age. Lungs:   Clear to auscultation bilaterally. Chest wall:  No tenderness or deformity. Heart:  Regular rate and rhythm, S1, S2 normal, no murmur, click, rub or gallop. Abdomen:   Soft, non-tender. Bowel sounds normal. No masses,  No organomegaly. Extremities: Extremities normal, atraumatic, no cyanosis or edema. Pulses: 2+ and symmetric all extremities. Skin: Skin color, texture, turgor normal. No rashes or lesions   Neurologic: CNII-XII intact. No gross sensory or motor deficits        CONSULTATIONS: Neurology    Significant Diagnostic Studies:   6/11/2022: BUN 25 mg/dL (H; Ref range: 6 - 20 mg/dL); Calcium 8.7 mg/dL (Ref range: 8.5 - 10.1 mg/dL); CO2 20 mmol/L (L; Ref range: 21 - 32 mmol/L); Creatinine 1.27 mg/dL (Ref range: 0.70 - 1.30 mg/dL); Glucose 167 mg/dL (H; Ref range: 65 - 100 mg/dL); HCT 41.7 % (Ref range: 36.6 - 50.3 %); HGB 14.2 g/dL (Ref range: 12.1 - 17.0 g/dL); Potassium 4.4 mmol/L (Ref range: 3.5 - 5.1 mmol/L); Sodium 137 mmol/L (Ref range: 136 - 145 mmol/L)  6/12/2022: BUN 17 mg/dL (Ref range: 6 - 20 mg/dL); Calcium 8.4 mg/dL (L; Ref range: 8.5 - 10.1 mg/dL); CO2 22 mmol/L (Ref range: 21 - 32 mmol/L); Creatinine 1.15 mg/dL (Ref range: 0.70 - 1.30 mg/dL); Glucose 153 mg/dL (H; Ref range: 65 - 100 mg/dL); HCT 37.5 % (Ref range: 36.6 - 50.3 %); HGB 12.5 g/dL (Ref range: 12.1 - 17.0 g/dL); Potassium 4.5 mmol/L (Ref range: 3.5 - 5.1 mmol/L);  Sodium 141 mmol/L (Ref range: 136 - 145 mmol/L)  Recent Labs     06/12/22  0828 06/11/22  1541   WBC 5.7 6.5   HGB 12.5 14.2   HCT 37.5 41.7   * 134*     Recent Labs     06/12/22  0828 06/11/22  1541  137   K 4.5 4.4   * 108   CO2 22 20*   BUN 17 25*   CREA 1.15 1.27   * 167*   CA 8.4* 8.7   MG 1.8  --      Recent Labs     06/12/22 0828 06/11/22  1541   ALT 18 17   AP 61 70   TBILI 0.5 0.6   TP 5.6* 6.6   ALB 2.5* 3.0*   GLOB 3.1 3.6     No results for input(s): INR, PTP, APTT, INREXT in the last 72 hours. No results for input(s): FE, TIBC, PSAT, FERR in the last 72 hours. No results for input(s): PH, PCO2, PO2 in the last 72 hours. No results for input(s): CPK, CKMB in the last 72 hours.     No lab exists for component: TROPONINI  Lab Results   Component Value Date/Time    Glucose (POC) 122 (H) 06/13/2022 11:18 AM    Glucose (POC) 119 (H) 06/13/2022 08:18 AM    Glucose (POC) 131 (H) 06/12/2022 06:54 PM    Glucose (POC) 120 (H) 06/12/2022 03:02 PM    Glucose (POC) 143 (H) 06/12/2022 11:47 AM       Discharge time spent 35 minutes    Signed:  Brigid Castillo MD  6/13/2022  1:53 PM

## 2022-06-13 NOTE — PROGRESS NOTES
Neurology Progress Note    Patient ID:  Joe Rojas  [id number]  89 y.o.  1940    Subjective: Mr. Keeley Venegas was seen this morning for rapid cognitive decline and he feels about the same. He is waiting for MRI      Patient is an 78year old man with DM, HTN, s/p left knee surgery 3 months ago and was recovering well who himself reports 1.5-2 week history of confusion and anxiety. He is awake, alert and partially oriented this am. Labs WNL. C head showed no acute changes, does have generalized brain atrophy.     Current Facility-Administered Medications   Medication Dose Route Frequency    melatonin tablet 6 mg  6 mg Oral QHS PRN    lisinopriL (PRINIVIL, ZESTRIL) tablet 20 mg  20 mg Oral DAILY    And    hydroCHLOROthiazide (HYDRODIURIL) tablet 12.5 mg  12.5 mg Oral DAILY    docusate sodium (COLACE) capsule 100 mg  100 mg Oral DAILY    sodium chloride (NS) flush 5-40 mL  5-40 mL IntraVENous Q8H    sodium chloride (NS) flush 5-40 mL  5-40 mL IntraVENous PRN    acetaminophen (TYLENOL) tablet 650 mg  650 mg Oral Q6H PRN    Or    acetaminophen (TYLENOL) suppository 650 mg  650 mg Rectal Q6H PRN    polyethylene glycol (MIRALAX) packet 17 g  17 g Oral DAILY PRN    ondansetron (ZOFRAN ODT) tablet 4 mg  4 mg Oral Q8H PRN    Or    ondansetron (ZOFRAN) injection 4 mg  4 mg IntraVENous Q6H PRN    enoxaparin (LOVENOX) injection 40 mg  40 mg SubCUTAneous DAILY    carbamide peroxide (DEBROX) 6.5 % otic solution 5 Drop  5 Drop Both Ears BID    cyanocobalamin (VITAMIN B12) tablet 500 mcg  500 mcg Oral DAILY    DULoxetine (CYMBALTA) capsule 20 mg  20 mg Oral DAILY    ezetimibe (ZETIA) tablet 10 mg  10 mg Oral DAILY    insulin glargine (LANTUS) injection 24 Units  24 Units SubCUTAneous DAILY    metoprolol succinate (TOPROL-XL) XL tablet 100 mg  100 mg Oral DAILY    glucose chewable tablet 16 g  4 Tablet Oral PRN    dextrose 10% infusion 0-250 mL  0-250 mL IntraVENous PRN    glucagon (GLUCAGEN) injection 1 mg 1 mg IntraMUSCular PRN    insulin lispro (HUMALOG) injection   SubCUTAneous AC&HS     Objective:     Patient Vitals for the past 8 hrs:   BP Temp Pulse Resp SpO2   06/13/22 1413 125/74 97.8 °F (36.6 °C) 76 18 96 %   06/13/22 1200 -- -- 80 -- --     No intake/output data recorded. 06/11 1901 - 06/13 0700  In: -   Out: 250 [Urine:250]    Lab Review   Recent Results (from the past 24 hour(s))   GLUCOSE, POC    Collection Time: 06/12/22  6:54 PM   Result Value Ref Range    Glucose (POC) 131 (H) 65 - 117 mg/dL    Performed by Emilia Wang    GLUCOSE, POC    Collection Time: 06/13/22  8:18 AM   Result Value Ref Range    Glucose (POC) 119 (H) 65 - 117 mg/dL    Performed by Jen Maguire, POC    Collection Time: 06/13/22 11:18 AM   Result Value Ref Range    Glucose (POC) 122 (H) 65 - 117 mg/dL    Performed by Thang Romero    GLUCOSE, POC    Collection Time: 06/13/22  5:33 PM   Result Value Ref Range    Glucose (POC) 95 65 - 117 mg/dL    Performed by Nancy Desai      Additional comments: MRI of the brain did not reveal any acute findings. NEUROLOGICAL EXAM:  Appearance: The patient is well developed, well nourished, provides a coherent history and is in no acute distress. Mental Status: Oriented partially to time, place and person. Mood and affect appropriate. Cranial Nerves:   Intact visual fields. DELMER, EOM's full, no nystagmus, no ptosis. Facial movement is symmetric. Hearing is normal bilaterally. Motor:  5/5 strength in upper and lower proximal and distal muscles. Normal bulk and tone. No fasciculations. Reflexes:   Deep tendon reflexes not checked. Sensory:   Normal to touch, pinprick. Gait:   Deferred. Tremor:   No tremor noted. Cerebellar:  No cerebellar signs present. Neurovascular:  Normal heart sounds and regular rhythms. Assessment:     Active Problems:    Acute cognitive decline (6/11/2022)    Plan:   1. MRI of the brain did not reveal any acute findings.   Seems like the patient probably developing early dementia. 2.  He can be discharged from neurology standpoint and follow-up with Dr. Deisi Henriquez in clinic in 3 to 4 weeks.     Thank you,    Signed:  Tomi Mayberry MD  6/13/2022  6:30 PM

## 2022-06-14 VITALS
HEIGHT: 68 IN | DIASTOLIC BLOOD PRESSURE: 69 MMHG | TEMPERATURE: 97.4 F | WEIGHT: 181.44 LBS | SYSTOLIC BLOOD PRESSURE: 113 MMHG | RESPIRATION RATE: 18 BRPM | BODY MASS INDEX: 27.5 KG/M2 | HEART RATE: 69 BPM | OXYGEN SATURATION: 94 %

## 2022-06-14 LAB
GLUCOSE BLD STRIP.AUTO-MCNC: 151 MG/DL (ref 65–117)
GLUCOSE BLD STRIP.AUTO-MCNC: 91 MG/DL (ref 65–117)
PERFORMED BY, TECHID: ABNORMAL
PERFORMED BY, TECHID: NORMAL

## 2022-06-14 PROCEDURE — 74011250636 HC RX REV CODE- 250/636: Performed by: INTERNAL MEDICINE

## 2022-06-14 PROCEDURE — 82962 GLUCOSE BLOOD TEST: CPT

## 2022-06-14 PROCEDURE — 92526 ORAL FUNCTION THERAPY: CPT

## 2022-06-14 PROCEDURE — 74011250637 HC RX REV CODE- 250/637: Performed by: INTERNAL MEDICINE

## 2022-06-14 PROCEDURE — 74011250637 HC RX REV CODE- 250/637: Performed by: STUDENT IN AN ORGANIZED HEALTH CARE EDUCATION/TRAINING PROGRAM

## 2022-06-14 PROCEDURE — 74011000250 HC RX REV CODE- 250: Performed by: INTERNAL MEDICINE

## 2022-06-14 RX ADMIN — CARBAMIDE PEROXIDE 6.5% 5 DROP: 6.5 LIQUID AURICULAR (OTIC) at 08:31

## 2022-06-14 RX ADMIN — DULOXETINE HYDROCHLORIDE 20 MG: 20 CAPSULE, DELAYED RELEASE ORAL at 08:25

## 2022-06-14 RX ADMIN — SODIUM CHLORIDE, PRESERVATIVE FREE 10 ML: 5 INJECTION INTRAVENOUS at 05:38

## 2022-06-14 RX ADMIN — CYANOCOBALAMIN TAB 500 MCG 500 MCG: 500 TAB at 08:25

## 2022-06-14 RX ADMIN — DOCUSATE SODIUM 100 MG: 100 CAPSULE, LIQUID FILLED ORAL at 08:25

## 2022-06-14 RX ADMIN — EZETIMIBE 10 MG: 10 TABLET ORAL at 08:25

## 2022-06-14 RX ADMIN — ENOXAPARIN SODIUM 40 MG: 100 INJECTION SUBCUTANEOUS at 08:25

## 2022-06-14 NOTE — DISCHARGE SUMMARY
Physician Discharge Summary     Patient ID:    Katia Lynne  [id number]  89 y.o.  1940    Admit date: 6/11/2022    Discharge date : 6/14/2022    Chronic Diagnoses:    Problem List as of 6/14/2022 Date Reviewed: 4/12/2022          Codes Class Noted - Resolved    Acute cognitive decline ICD-10-CM: R41.89  ICD-9-CM: 799.52  6/11/2022 - Present        Opioid use, unspecified with unspecified opioid-induced disorder ICD-10-CM: F11.99  ICD-9-CM: 292.9, 305.50  3/24/2022 - Present        OA (osteoarthritis) of knee ICD-10-CM: M17.10  ICD-9-CM: 715.36  3/17/2022 - Present        S/P total knee replacement, left ICD-10-CM: N38.254  ICD-9-CM: V43.65  3/17/2022 - Present        Osteoarthritis of left knee ICD-10-CM: M17.12  ICD-9-CM: 715.96  10/7/2021 - Present    Overview Signed 10/7/2021  2:27 PM by Carl Mendoza MD     Follows with orthopedic surgery             Polyneuropathy, unspecified ICD-10-CM: G62.9  ICD-9-CM: 356.9  10/10/2020 - Present        Hereditary and idiopathic neuropathy, unspecified ICD-10-CM: G60.9  ICD-9-CM: 356.9  7/29/2020 - Present        Vitamin D deficiency ICD-10-CM: E55.9  ICD-9-CM: 268.9  7/29/2020 - Present        Chronic kidney disease, stage 3 ([facility] [address] ICD-10-CM: N18.30  ICD-9-CM: 585.3  7/24/2019 - Present    Overview Signed 3/16/2021  9:26 AM by Mony Bowen CMA     stage 3a mild to moderate loss of kidney function             Type 2 diabetes mellitus with hyperglycemia, without long-term current use of insulin ([facility] [address] ICD-10-CM: E11.65  ICD-9-CM: 250.00, 790.29  3/3/2019 - Present        Essential hypertension ICD-10-CM: I10  ICD-9-CM: 401.9  3/3/2019 - Present        Mixed hyperlipidemia ICD-10-CM: E78.2  ICD-9-CM: 272.2  3/3/2019 - Present        Coronary arteriosclerosis ICD-10-CM: I25.10  ICD-9-CM: 414.00  6/29/2017 - Present          22    Final Diagnoses:   Acute cognitive decline [R41.89]  Stroke ruled out  Diabetes mellitus type 2  Essential hypertension  Dyslipidemia    Reason for Hospitalization:  Hospital course to date:  78M with past medical history of hypertension, coronary disease, dyslipidemia, diabetes on insulin, diabetic neuropathy, agent orange exposure.     3/17/22 he underwent a left total knee arthroplasty at [facility], followed by physical therapy and rehabilitation. In the postop course did quite well with recovery, started being more active and playing golf. At baseline he lives by himself and can drive and manage his own affairs.     6/9/22 presents to ED with rapid cognitive decline and physical debility with development over the previous few weeks. Difficulty in paying attention, difficulty remembering, not eating well, weight loss, difficulty in ambulation. During the ED course thought to be dehydrated and discharged home.     6/11/22 returns to hospital as family is unable to manage, with continued inability to eat, unable to ambulate, worsening cognition. Daughter reports she has an outpatient visit with neurology scheduled for next month but no prior formal neurological evaluation. I have been asked to admit to hospital for further stabilization and workup of his condition.         Hospital Course:   Patient was admitted to the medical floor. He was seen in consultation by neurology. MRI of the brain was obtained which showed no acute abnormality    His mental status returned to baseline    PT and OT felt he was stable for discharge home with home health              Discharge Medications:   Current Discharge Medication List      CONTINUE these medications which have NOT CHANGED    Details   carbamide peroxide (Debrox) 6.5 % otic solution Administer 5 Drops into each ear two (2) times a day.   Qty: 7.5 mL, Refills: 0      lisinopril-hydroCHLOROthiazide (PRINZIDE, ZESTORETIC) 20-12.5 mg per tablet TAKE 1 TABLET BY MOUTH DAILY  Qty: 90 Tablet, Refills: 3    Associated Diagnoses: Type 2 diabetes mellitus with hyperglycemia, without long-term current use of insulin ([facility])      insulin glargine (Lantus Solostar U-100 Insulin) 100 unit/mL (3 mL) inpn 24 Units by SubCUTAneous route nightly. Qty: 30 mL, Refills: 3    Associated Diagnoses: Type 2 diabetes mellitus with hyperglycemia, without long-term current use of insulin ([facility])      metoprolol succinate (TOPROL-XL) 100 mg tablet Take 100 mg by mouth daily. ondansetron (ZOFRAN ODT) 4 mg disintegrating tablet Take 1 Tablet by mouth every eight (8) hours as needed for Nausea, Vomiting or Nausea or Vomiting for up to 20 doses. Qty: 20 Tablet, Refills: 0      metFORMIN ER (GLUCOPHAGE XR) 500 mg tablet Take 2 Tablets by mouth two (2) times a day. Qty: 360 Tablet, Refills: 3    Associated Diagnoses: Type 2 diabetes mellitus with hyperglycemia, without long-term current use of insulin ([facility])      Accu-Chek Patricia Plus test strp strip USE TO TEST BLOOD SUGAR TWICE DAILY  Qty: 100 Strip, Refills: 5    Associated Diagnoses: Type 2 diabetes mellitus with hyperglycemia, without long-term current use of insulin ([facility])      ezetimibe (ZETIA) 10 mg tablet TAKE 1 TABLET BY MOUTH DAILY  Qty: 90 Tablet, Refills: 1    Associated Diagnoses: Mixed hyperlipidemia; Coronary arteriosclerosis      lancets misc USE TO TEST BLOOD SUGAR TWICE DAILY Dx Code: E11.65  Qty: 200 Each, Refills: 3    Associated Diagnoses: Type 2 diabetes mellitus with hyperglycemia, without long-term current use of insulin ([facility])      cyanocobalamin (VITAMIN B12) 500 mcg tablet Take 500 mcg by mouth daily. DULoxetine (CYMBALTA) 20 mg capsule TAKE 1 CAPSULE BY MOUTH EVERY DAY  Qty: 90 Cap, Refills: 0      Insulin Needles, Disposable, (ALICE PEN NEEDLE) 32 gauge x 5/32\" ndle Use to inject Lantus nightly.  DX Code: E11.65  Qty: 100 Pen Needle, Refills: 3    Associated Diagnoses: Type 2 diabetes mellitus with hyperglycemia, without long-term current use of insulin ([facility])      ergocalciferol (VITAMIN D2) 50,000 unit capsule Take 5,000 Units by mouth every seven (7) days. Follow up Care:    1. Davy Tam MD in 1-2 weeks. Please call to set up an appointment shortly after discharge. Diet:  Regular Diet    Disposition:  Home. Advanced Directive:   FULL    DNR      Discharge Exam:  General:  Alert, cooperative, no distress, appears stated age. Lungs:   Clear to auscultation bilaterally. Chest wall:  No tenderness or deformity. Heart:  Regular rate and rhythm, S1, S2 normal, no murmur, click, rub or gallop. Abdomen:   Soft, non-tender. Bowel sounds normal. No masses,  No organomegaly. Extremities: Extremities normal, atraumatic, no cyanosis or edema. Pulses: 2+ and symmetric all extremities. Skin: Skin color, texture, turgor normal. No rashes or lesions   Neurologic: CNII-XII intact. No gross sensory or motor deficits        CONSULTATIONS: Neurology    Significant Diagnostic Studies:   6/11/2022: BUN 25 mg/dL (H; Ref range: 6 - 20 mg/dL); Calcium 8.7 mg/dL (Ref range: 8.5 - 10.1 mg/dL); CO2 20 mmol/L (L; Ref range: 21 - 32 mmol/L); Creatinine 1.27 mg/dL (Ref range: 0.70 - 1.30 mg/dL); Glucose 167 mg/dL (H; Ref range: 65 - 100 mg/dL); HCT 41.7 % (Ref range: 36.6 - 50.3 %); HGB 14.2 g/dL (Ref range: 12.1 - 17.0 g/dL); Potassium 4.4 mmol/L (Ref range: 3.5 - 5.1 mmol/L); Sodium 137 mmol/L (Ref range: 136 - 145 mmol/L)  6/12/2022: BUN 17 mg/dL (Ref range: 6 - 20 mg/dL); Calcium 8.4 mg/dL (L; Ref range: 8.5 - 10.1 mg/dL); CO2 22 mmol/L (Ref range: 21 - 32 mmol/L); Creatinine 1.15 mg/dL (Ref range: 0.70 - 1.30 mg/dL); Glucose 153 mg/dL (H; Ref range: 65 - 100 mg/dL); HCT 37.5 % (Ref range: 36.6 - 50.3 %); HGB 12.5 g/dL (Ref range: 12.1 - 17.0 g/dL); Potassium 4.5 mmol/L (Ref range: 3.5 - 5.1 mmol/L);  Sodium 141 mmol/L (Ref range: 136 - 145 mmol/L)  Recent Labs     06/12/22  0828 06/11/22  1541   WBC 5.7 6.5   HGB 12.5 14.2   HCT 37.5 41.7   * 134*     Recent Labs     06/12/22  0828 06/11/22  1541  137   K 4.5 4.4   * 108   CO2 22 20*   BUN 17 25*   CREA 1.15 1.27   * 167*   CA 8.4* 8.7   MG 1.8  --      Recent Labs     06/12/22  0828 06/11/22  1541   ALT 18 17   AP 61 70   TBILI 0.5 0.6   TP 5.6* 6.6   ALB 2.5* 3.0*   GLOB 3.1 3.6     No results for input(s): INR, PTP, APTT, INREXT, INREXT in the last 72 hours. No results for input(s): FE, TIBC, PSAT, FERR in the last 72 hours. No results for input(s): PH, PCO2, PO2 in the last 72 hours. No results for input(s): CPK, CKMB in the last 72 hours.     No lab exists for component: TROPONINI  Lab Results   Component Value Date/Time    Glucose (POC) 151 (H) 06/14/2022 11:16 AM    Glucose (POC) 91 06/14/2022 07:22 AM    Glucose (POC) 85 06/13/2022 10:58 PM    Glucose (POC) 95 06/13/2022 05:33 PM    Glucose (POC) 122 (H) 06/13/2022 11:18 AM       Discharge time spent 35 minutes    Signed:  Leonela Guzmán MD  6/14/2022  1:53 PM

## 2022-06-14 NOTE — PROGRESS NOTES
CM spoke with [facility].  Patient does not have an admitting diagnosis for IRF. CM spoke with patient's daughter and offered [facility] or SNF. Patient's daughter prefers [facility]. Choice letter signed and placed on chart. Referral sent. No preference on company. Patient was accepted with [facility] with an anticipated start of care date of 6/15/22. Patient's daughter will be at hospital to pick patient up around 2pm.  Primary RN made aware. Discharge plan of care/case management plan validated with provider discharge order. Medicare pt has received, reviewed, and signed 2nd IM letter informing them of their right to appeal the discharge. Signed copied has been placed on pt bedside chart.

## 2022-06-14 NOTE — PROGRESS NOTES
SPEECH LANGUAGE PATHOLOGY DYSPHAGIA TREATMENT  Patient: Francesco Silva (51 y.o. male)  Date: 6/14/2022  Diagnosis: Acute cognitive decline [R41.89] <principal problem not specified>       Precautions: aspiration      ASSESSMENT:  Pt seen for follow-up dysphagia tx for appropriateness of diet upgrade. Pt with reduced intake per report and poor appetite per pt. Pt given trials of thin via cup/straw, puree and soft solids. Oropharyngeal sw function WFL and pt utilizes liquid wash without difficulty. Pt tolerates all trials without overt s/s aspiration. Pt is [place]. Pt is alert and oriented x 4 with use of information board in room. Pt is able to state name of [address] president as well as current estimated gas price. Pt responds to conversational questions appropriately. Per Dr. Ale Calabrese, neurologist, note dated 6-, [phone] 68 : MRI negative for acute findings and MD suspects possibly developing early dementia. Pt appears to benefit from compensatory strategies for recall/orientation. PLAN:  Recommendations and Planned Interventions: At this time, recommend diet upgrade to easy to chew/thin, with pt food preferences. Pt is for anticipated d/c home with [facility] per . Recs for SLP follow-up in home setting relayed to . Recommend complete neuro-cog evaluation with neurologist after d/c from acute due to reported suspected dementia. Recommend SLP assessment for cog-ling function and compensatory strategies in next level of care. Patient continues to benefit from skilled intervention to address the above impairments. Continue treatment per established plan of care. Frequency/Duration: Patient will be followed by speech-language pathology 1 time a week to address goals, if pt remains in acute. Discharge Recommendations:  Home Health     SUBJECTIVE:   Patient alert, agreeable, pleasant.     OBJECTIVE:     Cognitive and Communication Status:  Neurologic State: Alert  Orientation Level: Oriented X4 (with use of board in room)  Cognition: Follows commands,Appropriate decision making,Appropriate for age attention/concentration,Appropriate safety awareness  Perception: Appears intact  Perseveration: No perseveration noted  Safety/Judgement: Awareness of environment     Pain:  Pain Scale 1: Numeric (0 - 10)  Pain Intensity 1: 0       After treatment:   Patient left in no apparent distress in bed, Call bell within reach, Nursing notified, and Bed / chair alarm activated    COMMUNICATION/EDUCATION:   Patient was educated regarding swallow safety precautions, diet recs and POC. He demonstrated Good understanding as evidenced by verbal responsiveness. The patient's plan of care including recommendations were discussed with: Registered nurse and Case management.      Angeli Carpenter M.S. CCC-SLP  Time Calculation: 14 mins

## 2022-06-14 NOTE — PROGRESS NOTES
Called Dr. Sandy Webster office and tried to make appointment office is closed. Patient or family will have to call for appointment.

## 2022-06-14 NOTE — WOUND CARE
Healed surgical incision noted to left knee. Otherwise, no wounds per patient. Patient moves independently. No incontinence issues noted. WCN will sign off at this time. Re-consult WCN if skin condition changes.

## 2022-06-15 ENCOUNTER — PATIENT OUTREACH (OUTPATIENT)
Dept: CASE MANAGEMENT | Age: 82
End: 2022-06-15

## 2022-06-15 NOTE — PROGRESS NOTES
Care Transitions Initial Call    Call within 2 business days of discharge: Yes     Patient: Nicolette Sampson Patient : 1940 MRN: [id number]    Last Discharge REHABILITATION [facility]       Complaint Diagnosis Description Type Department Provider    22 Altered mental status Weakness . .. ED to Hosp-Admission (Discharged) (ADMIT) Angie Niño MD; Chanel Davila, ... Was this an external facility discharge? No     Challenges to be reviewed by the provider   Additional needs identified to be addressed with provider:  [facility] to begin next week per patient's request       Method of communication with provider : chart routing    Discussed COVID-19 related testing which was not done at this time. Advance Care Planning:   Does patient have an Advance Directive: not on file. Inpatient Readmission Risk score: Unplanned Readmit Risk Score: 12.2 ( )    Was this a readmission? no   Patient stated reason for the admission: AMS    Patients top risk factors for readmission: functional physical ability, ineffective coping, level of motivation and medication management   Interventions to address risk factors: Scheduled appointment with PCP-daughter will arrange, Obtained and reviewed discharge summary and/or continuity of care documents and Communication with home health agencies or other community services the patient is currently using-[facility]    Care Transition Nurse (CTN) contacted the daughter, Josie Ang by telephone to perform post hospital discharge assessment. Verified name and  with family as identifiers. Provided introduction to self, and explanation of the CTN role. Reports patient came to her house last night due to patients A/C stopped working. Reports patient seemed better, but then progressed to not wanting to eat, drink water, or take his meds. Josie Ang hopes that while he is in her care, he will improve. Especially since [facility] PT will provide therapy, which was deferred for one week.  Discussed high protein diet, fall prevention, safety precaution. Forest Cui states that patient \"does not want to be independent anymore\". CTN reviewed discharge instructions, medical action plan and red flags with family who verbalized understanding. Were discharge instructions available to patient? yes. Reviewed appropriate site of care based on symptoms and resources available to patient including: PCP. Family given an opportunity to ask questions and does not have any further questions or concerns at this time. The family agrees to contact the PCP office for questions related to their healthcare. Medication reconciliation was NOT performed with family. Referral to Pharm D needed: no     Home Health/Outpatient orders at discharge: home health care, PT and [facility]  Date of initial visit: \"next week\" [facility] Equipment ordered at discharge: None    Was patient discharged with a pulse oximeter? no    Discussed follow-up appointments. If no appointment was previously scheduled, appointment scheduling offered: no. Is follow up appointment scheduled within 7 days of discharge? no.   [address] follow up appointment(s):   Future Appointments   Date Time Provider Kiran Montoya   8/9/2022  1:45 PM Roberto Menendez MD CDE BS AMB   8/12/2022  9:30 AM Jac Orozco MD [facility] BS AMB   10/6/2022  2:30 PM Melissa Santacruz MD [facility] BS [facility]   3/28/2023 10:50 AM Annel Asencio MD [facility] BS [facility] follow up appointment(s): NA    Plan for follow-up call in 5-7 days based on severity of symptoms and risk factors. Plan for next call: self management-independence improved and community resources-HH PT soc? CTN provided contact information for future needs. Goals Addressed                 This Visit's Progress     Prevent complications post hospitalization.           06/16/22   In the coming week, patient will have greater independence   Will participate in [facility] PT to improve strength and mobility   Pt will remain out of the hospital or ER for remainder of PIETRO period

## 2022-06-16 ENCOUNTER — TELEPHONE (OUTPATIENT)
Dept: FAMILY MEDICINE CLINIC | Age: 82
End: 2022-06-16

## 2022-06-16 NOTE — TELEPHONE ENCOUNTER
Kenyetta Aguiar spoke to me about this patient and the paperwork that was sent to Dr. Colt Flood. I left voicemail with Taniya Blevins at [facility], stating that Dr. Colt Flood will follow this patient for his home health. I left her our telephone number if she needed to speak with Kenyetta Aguiar, and the back fax number if she needed anything expedited regarding this initial order, anticipated start date 06/16/22.

## 2022-06-24 ENCOUNTER — PATIENT OUTREACH (OUTPATIENT)
Dept: CASE MANAGEMENT | Age: 82
End: 2022-06-24

## 2022-06-24 NOTE — PROGRESS NOTES
Care Transitions Follow Up Call    Care Transition Nurse (CTN) contacted the family by telephone to follow up after admission on 6/11/2022. Addressed changes since last contact: none  Follow up appointment completed? no.   Was follow up appointment scheduled within 7 days of discharge? no.     CTN reviewed medical action plan and red flags with family and discussed any barriers to care and/or understanding of plan of care after discharge. Discussed appropriate site of care based on symptoms and resources available to patient including: NA.      Patients top risk factors for readmission: transportation and caregiver stress   Interventions to address risk factors: NA    [facility] follow up appointment(s):   Future Appointments   Date Time Provider Kiran Montoya   8/9/2022  1:45 PM Aicha Moore MD CDE [facility]   8/12/2022  9:30 AM [address] [facility] Gerald Pedraza MD [facility]   10/6/2022  2:30 PM Sanya Giraldo MD [facility]   3/28/2023 10:50 AM Tiny Stockton MD [facility] follow up appointment(s): NA    CTN provided contact information for future needs. Plan for follow-up call in 5-7 days based on severity of symptoms and risk factors. Goals Addressed                 This Visit's Progress     Prevent complications post hospitalization.           06/16/22   In the coming week, patient will have greater independence   Will participate in [facility] PT to improve strength and mobility   Pt will remain out of the hospital or ER for remainder of PIETRO period    06/24/22   Per daughter, patient's independence has grown  ANNIA Abbott in [facility] PT

## 2022-07-05 ENCOUNTER — PATIENT OUTREACH (OUTPATIENT)
Dept: CASE MANAGEMENT | Age: 82
End: 2022-07-05

## 2022-07-05 NOTE — PROGRESS NOTES
Contacted the patient for a follow up  Reports he is doing fine  States his energy level is improving daily  Continues with HH PT twice per week      [facility] follow up appointment(s):   Future Appointments   Date Time Provider Kiran Montoya   8/9/2022  1:45 PM Farida Heart MD CDE [facility]   8/12/2022  9:30 AM Mason Brady MD RS BS [facility]   10/6/2022  2:30 PM Josette Chicas MD [facility]   3/28/2023 10:50 AM Justin Herron MD [facility]     Non-[facility] follow up appointment(s): NA    CTN provided contact information for future needs. No further follow-up call indicated based on severity of symptoms and risk factors.

## 2022-07-14 ENCOUNTER — PATIENT OUTREACH (OUTPATIENT)
Dept: CASE MANAGEMENT | Age: 82
End: 2022-07-14

## 2022-07-14 NOTE — PROGRESS NOTES
Patient has graduated from the Transitions of Care Coordination  program on 7/14/2022. Patient/family has the ability to self-manage at this time Care management goals have been completed. Patient was not referred to the [facility] team for further management. Goals Addressed                 This Visit's Progress     COMPLETED: Prevent complications post hospitalization. 06/16/22   In the coming week, patient will have greater independence   Will participate in [facility] PT to improve strength and mobility   Pt will remain out of the hospital or ER for remainder of PIETRO period    06/24/22   Per daughter, patient's independence has grown   Participating in [facility] PT            Patient has Care Transition Nurse's contact information for any further questions, concerns, or needs.   Patients upcoming visits:    Future Appointments   Date Time Provider Kiran Montoya   8/9/2022  1:45 PM Annika Maxwell MD CDE BS AMB   8/12/2022  9:30 AM Sallie Acuna MD [facility] BS AMB   10/6/2022  2:30 PM Leslee Walters MD [facility] BS AMB   3/28/2023 10:50 AM Bean Ham MD [facility] BS AMB

## 2022-07-19 ENCOUNTER — TELEPHONE (OUTPATIENT)
Dept: ENDOCRINOLOGY | Age: 82
End: 2022-07-19

## 2022-07-19 DIAGNOSIS — E11.65 TYPE 2 DIABETES MELLITUS WITH HYPERGLYCEMIA, WITHOUT LONG-TERM CURRENT USE OF INSULIN (HCC): ICD-10-CM

## 2022-07-19 RX ORDER — BLOOD SUGAR DIAGNOSTIC
STRIP MISCELLANEOUS
Qty: 300 STRIP | Refills: 3 | Status: SHIPPED | OUTPATIENT
Start: 2022-07-19

## 2022-07-19 NOTE — TELEPHONE ENCOUNTER
[facility] needs [facility]u [facility] Please forward new script to them. Only has 1 week worth of strips supplies left.  Thank you    Dr nuris hull

## 2022-07-22 DIAGNOSIS — I25.10 CORONARY ARTERIOSCLEROSIS: ICD-10-CM

## 2022-07-22 DIAGNOSIS — E78.2 MIXED HYPERLIPIDEMIA: ICD-10-CM

## 2022-07-22 RX ORDER — EZETIMIBE 10 MG/1
TABLET ORAL
Qty: 90 TABLET | Refills: 1 | Status: SHIPPED | OUTPATIENT
Start: 2022-07-22

## 2022-07-25 ENCOUNTER — TELEPHONE (OUTPATIENT)
Dept: ENDOCRINOLOGY | Age: 82
End: 2022-07-25

## 2022-07-25 DIAGNOSIS — E11.65 TYPE 2 DIABETES MELLITUS WITH HYPERGLYCEMIA, WITHOUT LONG-TERM CURRENT USE OF INSULIN (HCC): Primary | ICD-10-CM

## 2022-07-25 NOTE — TELEPHONE ENCOUNTER
Patient needs for test strips to be prior auth. With mail order pharm. Please work this as soon as possible. Will run out by Friday. If possible call in a 30day to local pharmacy.  Thank you

## 2022-07-25 NOTE — TELEPHONE ENCOUNTER
Requested Prescriptions     Signed Prescriptions Disp Refills    glucose blood VI test strips (ASCENSIA AUTODISC VI, ONE TOUCH ULTRA TEST VI) strip 100 Strip 1     Sig: Use to check blood glucose level 3 times daily. DX E11.65     Authorizing Provider: Marilyn Medina     Ordering User: Marlene Campbell     Verbal order read back to Dr Michelle Brewster to send refill.   Will [facility] in separate encounter

## 2022-08-04 DIAGNOSIS — E11.65 TYPE 2 DIABETES MELLITUS WITH HYPERGLYCEMIA, WITHOUT LONG-TERM CURRENT USE OF INSULIN (HCC): Primary | ICD-10-CM

## 2022-08-04 RX ORDER — BLOOD-GLUCOSE METER
KIT MISCELLANEOUS
Qty: 300 STRIP | Refills: 3 | Status: SHIPPED | OUTPATIENT
Start: 2022-08-04

## 2022-08-09 ENCOUNTER — VIRTUAL VISIT (OUTPATIENT)
Dept: ENDOCRINOLOGY | Age: 82
End: 2022-08-09
Payer: MEDICARE

## 2022-08-09 DIAGNOSIS — E11.65 TYPE 2 DIABETES MELLITUS WITH HYPERGLYCEMIA, WITHOUT LONG-TERM CURRENT USE OF INSULIN (HCC): Primary | ICD-10-CM

## 2022-08-09 DIAGNOSIS — E78.2 MIXED HYPERLIPIDEMIA: ICD-10-CM

## 2022-08-09 DIAGNOSIS — I10 ESSENTIAL HYPERTENSION: ICD-10-CM

## 2022-08-09 PROCEDURE — G8427 DOCREV CUR MEDS BY ELIG CLIN: HCPCS | Performed by: INTERNAL MEDICINE

## 2022-08-09 PROCEDURE — 1123F ACP DISCUSS/DSCN MKR DOCD: CPT | Performed by: INTERNAL MEDICINE

## 2022-08-09 PROCEDURE — 99214 OFFICE O/P EST MOD 30 MIN: CPT | Performed by: INTERNAL MEDICINE

## 2022-08-09 PROCEDURE — 1101F PT FALLS ASSESS-DOCD LE1/YR: CPT | Performed by: INTERNAL MEDICINE

## 2022-08-09 PROCEDURE — 3051F HG A1C>EQUAL 7.0%<8.0%: CPT | Performed by: INTERNAL MEDICINE

## 2022-08-09 PROCEDURE — G8432 DEP SCR NOT DOC, RNG: HCPCS | Performed by: INTERNAL MEDICINE

## 2022-08-09 PROCEDURE — G8756 NO BP MEASURE DOC: HCPCS | Performed by: INTERNAL MEDICINE

## 2022-08-09 RX ORDER — INSULIN PUMP SYRINGE, 3 ML
EACH MISCELLANEOUS
Qty: 1 KIT | Refills: 0 | Status: SHIPPED | OUTPATIENT
Start: 2022-08-09

## 2022-08-09 RX ORDER — DONEPEZIL HYDROCHLORIDE 10 MG/1
TABLET, FILM COATED ORAL
COMMUNITY
Start: 2022-07-27

## 2022-08-09 NOTE — PROGRESS NOTES
Kp Tyler is a 78 y.o. male here for   Chief Complaint   Patient presents with    Diabetes       1. Have you been to the ER, urgent care clinic since your last visit? Hospitalized since your last visit? -[facility] 6/9/22 and 6/11/22    2. Have you seen or consulted any other health care providers outside of the [address] [place] [address] since your last visit?   Include any pap smears or colon screening.-neurology

## 2022-08-09 NOTE — PROGRESS NOTES
Eric Huang MD            Patient Information  Date:8/9/2022  Name : Jacqueline Callahan 78 y.o.     YOB: 1940               Chief Complaint   Patient presents with    Diabetes   Pursuant to the emergency declaration under the [facility] [place] [address] waiver authority and the [employer] Act, this Virtual  Visit was conducted, with patient's consent, to reduce the patient's risk of exposure to COVID-19 . Patient  is aware that this is a billable encounter and is responsible for copays/deductibles       Services were provided through a video synchronous discussion virtually to substitute for in-person clinic visit. Place of service: Provider : Office  Patient: Home    History of Present Illness: Jacqueline Callahan is a 78 y.o. male here for follow-up of  Type 2 Diabetes Mellitus. Self-referred for evaluation management of type 2 diabetes mellitus. He was managed by Dr. Raúl Xiong in the past  He is on metformin, lantus 24 units  He is checking fasting blood glucose, no hypoglycemia, blood glucose less than 130  Was recently admitted for dehydration  Recent knee replacement    Prior history    Eating chocolate, ice cream at bedtime,  Diabetes was diagnosed in 2015, complains of tingling, pain in the feet.   Evaluated by Dr. Elia Sanz: Has peripheral neuropathy, sciatic radiculopathy, left leg weakness  Qualified for extra benefits based on  disability benefits          Eats 3 meals a day,    Wt Readings from Last 3 Encounters:   06/12/22 181 lb 7 oz (82.3 kg)   06/09/22 161 lb (73 kg)   04/12/22 180 lb 6.4 oz (81.8 kg)       BP Readings from Last 3 Encounters:   06/14/22 113/69   06/09/22 105/84   04/12/22 128/74           Past Medical History:   Diagnosis Date    Cataract     Chronic kidney disease, stage 3 ([facility] [address] 7/24/2019    stage 3a mild to moderate loss of kidney function    Coronary arteriosclerosis 6/29/2017    Coronary artery disease     Hyperlipidemia     Hypertension     Neuropathy     Type 2 diabetes mellitus (HCC)     Vitamin D deficiency      Current Outpatient Medications   Medication Sig    donepeziL (ARICEPT) 10 mg tablet TAKE 1/2 TABLET EVERY DAY X 2 WEEKS THEN TAKE 1 TABLET BY MOUTH EVERY DAY    glucose blood VI test strips (FreeStyle Lite Strips) strip Use to check blood glucose level 3 times daily. DX E11.65    ezetimibe (ZETIA) 10 mg tablet TAKE 1 TABLET BY MOUTH DAILY    lisinopril-hydroCHLOROthiazide (PRINZIDE, ZESTORETIC) 20-12.5 mg per tablet TAKE 1 TABLET BY MOUTH DAILY    insulin glargine (Lantus Solostar U-100 Insulin) 100 unit/mL (3 mL) inpn 24 Units by SubCUTAneous route nightly. metoprolol succinate (TOPROL-XL) 100 mg tablet Take 100 mg by mouth daily. metFORMIN ER (GLUCOPHAGE XR) 500 mg tablet Take 2 Tablets by mouth two (2) times a day. lancets misc USE TO TEST BLOOD SUGAR TWICE DAILY Dx Code: E11.65    cyanocobalamin (VITAMIN B12) 500 mcg tablet Take 500 mcg by mouth daily. Insulin Needles, Disposable, (ALICE PEN NEEDLE) 32 gauge x 5/32\" ndle Use to inject Lantus nightly. DX Code: E11.65    ergocalciferol (ERGOCALCIFEROL) 1,250 mcg (50,000 unit) capsule Take 5,000 Units by mouth every seven (7) days. glucose blood VI test strips (ASCENSIA AUTODISC VI, ONE TOUCH ULTRA TEST VI) strip Use to check blood glucose level 3 times daily. DX E11.65 (Patient not taking: Reported on 8/9/2022)    glucose blood VI test strips (Accu-Chek Patricia Plus test strp) strip Use to check BG 3 times daily. Dx code E11.65 (Patient not taking: Reported on 8/9/2022)    carbamide peroxide (Debrox) 6.5 % otic solution Administer 5 Drops into each ear two (2) times a day.  (Patient not taking: Reported on 8/9/2022)    ondansetron (ZOFRAN ODT) 4 mg disintegrating tablet Take 1 Tablet by mouth every eight (8) hours as needed for Nausea, Vomiting or Nausea or Vomiting for up to 20 doses. (Patient not taking: No sig reported)    DULoxetine (CYMBALTA) 20 mg capsule TAKE 1 CAPSULE BY MOUTH EVERY DAY (Patient not taking: Reported on 8/9/2022)     No current facility-administered medications for this visit. No Known Allergies    Review of Systems: Per HPI    Physical Examination:   There were no vitals taken for this visit. Estimated body mass index is 27.59 kg/m² as calculated from the following:    Height as of 6/11/22: 5' 8\" (1.727 m). Weight as of 6/12/22: 181 lb 7 oz (82.3 kg).   General: pleasant, no distress, good eye contact  HEENT: no exophthalmos, no periorbital edema, EOMI  Neck: No visible thyromegaly  RS: Normal respiratory effort  Musculoskeletal: no tremors  Neurological: alert and oriented  Psychiatric: normal mood and affect  Skin: Normal color    Diabetic foot exam: March 2019    Left Foot:   Visual Exam: callous - Great toe   Pulse DP: 1+ (weak)   Filament test: reduced sensation    Vibratory sensation: diminished      Right Foot:   Visual Exam: normal    Pulse DP: 1+ (weak)   Filament test: reduced sensation    Vibratory sensation: diminished  Diabetic Foot and Eye Exam HM Status   Topic Date Due    Eye Exam  Never done    Diabetic Foot Care  04/12/2023         Data Reviewed:       Lab Results   Component Value Date/Time    Hemoglobin A1c 7.1 (H) 04/05/2022 11:08 AM    Hemoglobin A1c 7.9 (H) 11/29/2021 12:00 AM    Hemoglobin A1c 7.6 (H) 07/16/2021 12:00 AM    Hemoglobin A1c, External 6.9 10/29/2019 12:00 AM    Hemoglobin A1c, External 6.9 10/28/2019 12:00 AM    Glucose 153 (H) 06/12/2022 08:28 AM    Glucose (POC) 151 (H) 06/14/2022 11:16 AM    Microalbumin/Creat ratio (mg/g creat) 5 04/05/2022 11:08 AM    Microalbumin,urine random 0.90 04/05/2022 11:08 AM    LDL,Direct 97 04/05/2022 11:08 AM    LDL, calculated 48.4 06/12/2022 08:28 AM    Creatinine 1.15 06/12/2022 08:28 AM      Lab Results   Component Value Date/Time    GFR est non-AA >60 06/12/2022 08:28 AM GFR est AA >60 06/12/2022 08:28 AM    Creatinine 1.15 06/12/2022 08:28 AM    BUN 17 06/12/2022 08:28 AM    Sodium 141 06/12/2022 08:28 AM    Potassium 4.5 06/12/2022 08:28 AM    Chloride 113 (H) 06/12/2022 08:28 AM    CO2 22 06/12/2022 08:28 AM    Magnesium 1.8 06/12/2022 08:28 AM       Assessment/Plan:     1. Type 2 diabetes mellitus with hyperglycemia, without long-term current use of insulin ([facility] [address]    2. Essential hypertension    3. Mixed hyperlipidemia        1. Type 2 Diabetes Mellitus   Lab Results   Component Value Date/Time    Hemoglobin A1c 7.1 (H) 04/05/2022 11:08 AM    Hemoglobin A1c (POC) 6.6 10/09/2020 10:03 AM    Hemoglobin A1c, External 6.9 10/29/2019 12:00 AM   A1c March 2021: 7.7  A1c November 2021 7.9  Stable control  Lantus 24 units  Annual eye exam, foot care  Reviewed labs from the hospital, he is drinking Gatorade, changed to Gatorade G2       2. HTN : Continue current therapy     3. Hyperlipidemia : zetia     4. CAD  No calf claudication    5. Peripheral neuropathy: Followed by neurology    There are no Patient Instructions on file for this visit. Thank you for allowing me to participate in the care of this patient. Harry Blount MD      Patient verbalized understanding     Voice-recognition software was used to generate this report, which may result in some phonetic-based errors in the grammar and contents. Even though attempts were made to correct all the mistakes, some may have been missed and remained in the body of the report.

## 2022-10-05 ENCOUNTER — OFFICE VISIT (OUTPATIENT)
Dept: FAMILY MEDICINE CLINIC | Age: 82
End: 2022-10-05
Payer: MEDICARE

## 2022-10-05 VITALS
TEMPERATURE: 96.9 F | WEIGHT: 171.8 LBS | DIASTOLIC BLOOD PRESSURE: 80 MMHG | OXYGEN SATURATION: 98 % | SYSTOLIC BLOOD PRESSURE: 126 MMHG | BODY MASS INDEX: 26.04 KG/M2 | HEIGHT: 68 IN | HEART RATE: 81 BPM

## 2022-10-05 DIAGNOSIS — I73.9 PAD (PERIPHERAL ARTERY DISEASE) (HCC): ICD-10-CM

## 2022-10-05 DIAGNOSIS — I25.10 CORONARY ARTERIOSCLEROSIS: ICD-10-CM

## 2022-10-05 DIAGNOSIS — E55.9 VITAMIN D DEFICIENCY: ICD-10-CM

## 2022-10-05 DIAGNOSIS — Z00.00 MEDICARE ANNUAL WELLNESS VISIT, SUBSEQUENT: Primary | ICD-10-CM

## 2022-10-05 DIAGNOSIS — I10 ESSENTIAL HYPERTENSION: ICD-10-CM

## 2022-10-05 DIAGNOSIS — E78.2 MIXED HYPERLIPIDEMIA: ICD-10-CM

## 2022-10-05 PROCEDURE — G8752 SYS BP LESS 140: HCPCS | Performed by: STUDENT IN AN ORGANIZED HEALTH CARE EDUCATION/TRAINING PROGRAM

## 2022-10-05 PROCEDURE — G8536 NO DOC ELDER MAL SCRN: HCPCS | Performed by: STUDENT IN AN ORGANIZED HEALTH CARE EDUCATION/TRAINING PROGRAM

## 2022-10-05 PROCEDURE — 1123F ACP DISCUSS/DSCN MKR DOCD: CPT | Performed by: STUDENT IN AN ORGANIZED HEALTH CARE EDUCATION/TRAINING PROGRAM

## 2022-10-05 PROCEDURE — G8754 DIAS BP LESS 90: HCPCS | Performed by: STUDENT IN AN ORGANIZED HEALTH CARE EDUCATION/TRAINING PROGRAM

## 2022-10-05 PROCEDURE — 1101F PT FALLS ASSESS-DOCD LE1/YR: CPT | Performed by: STUDENT IN AN ORGANIZED HEALTH CARE EDUCATION/TRAINING PROGRAM

## 2022-10-05 PROCEDURE — G0439 PPPS, SUBSEQ VISIT: HCPCS | Performed by: STUDENT IN AN ORGANIZED HEALTH CARE EDUCATION/TRAINING PROGRAM

## 2022-10-05 PROCEDURE — G8427 DOCREV CUR MEDS BY ELIG CLIN: HCPCS | Performed by: STUDENT IN AN ORGANIZED HEALTH CARE EDUCATION/TRAINING PROGRAM

## 2022-10-05 PROCEDURE — G8417 CALC BMI ABV UP PARAM F/U: HCPCS | Performed by: STUDENT IN AN ORGANIZED HEALTH CARE EDUCATION/TRAINING PROGRAM

## 2022-10-05 PROCEDURE — G8510 SCR DEP NEG, NO PLAN REQD: HCPCS | Performed by: STUDENT IN AN ORGANIZED HEALTH CARE EDUCATION/TRAINING PROGRAM

## 2022-10-05 NOTE — PROGRESS NOTES
Chief Complaint   Patient presents with    Annual Wellness Visit     Medicare   Visit Vitals  /80 (BP 1 Location: Left arm, BP Patient Position: Sitting, BP Cuff Size: Adult)   Pulse 81   Temp 96.9 °F (36.1 °C) (Temporal)   Ht 5' 8\" (1.727 m)   Wt 171 lb 12.8 oz (77.9 kg)   SpO2 98%   BMI 26.12 kg/m²       1. \"Have you been to the ER, urgent care clinic since your last visit? Hospitalized since your last visit? \" No    2. \"Have you seen or consulted any other health care providers outside of the [address] [place] [address] since your last visit? \" No     3. For patients aged 39-70: Has the patient had a colonoscopy / FIT/ Cologuard? NA - based on age      If the patient is female:    4. For patients aged 41-77: Has the patient had a mammogram within the past 2 years? NA - based on age or sex      11. For patients aged 21-65: Has the patient had a pap smear?  NA - based on age or sex  3 most recent PHQ Screens 10/5/2022   Little interest or pleasure in doing things Not at all   Feeling down, depressed, irritable, or hopeless Not at all   Total Score PHQ 2 0

## 2022-10-06 LAB
25(OH)D3+25(OH)D2 SERPL-MCNC: 69.2 NG/ML (ref 30–100)
ALBUMIN SERPL-MCNC: 4.2 G/DL (ref 3.6–4.6)
ALBUMIN/GLOB SERPL: 1.6 {RATIO} (ref 1.2–2.2)
ALP SERPL-CCNC: 73 IU/L (ref 44–121)
ALT SERPL-CCNC: 13 IU/L (ref 0–44)
AST SERPL-CCNC: 17 IU/L (ref 0–40)
BASOPHILS # BLD AUTO: 0.1 X10E3/UL (ref 0–0.2)
BASOPHILS NFR BLD AUTO: 1 %
BILIRUB SERPL-MCNC: 0.4 MG/DL (ref 0–1.2)
BUN SERPL-MCNC: 15 MG/DL (ref 8–27)
BUN/CREAT SERPL: 11 (ref 10–24)
CALCIUM SERPL-MCNC: 9.7 MG/DL (ref 8.6–10.2)
CHLORIDE SERPL-SCNC: 105 MMOL/L (ref 96–106)
CHOLEST SERPL-MCNC: 146 MG/DL (ref 100–199)
CO2 SERPL-SCNC: 20 MMOL/L (ref 20–29)
CREAT SERPL-MCNC: 1.31 MG/DL (ref 0.76–1.27)
EGFR: 54 ML/MIN/1.73
EOSINOPHIL # BLD AUTO: 0.2 X10E3/UL (ref 0–0.4)
EOSINOPHIL NFR BLD AUTO: 2 %
ERYTHROCYTE [DISTWIDTH] IN BLOOD BY AUTOMATED COUNT: 12.7 % (ref 11.6–15.4)
GLOBULIN SER CALC-MCNC: 2.7 G/DL (ref 1.5–4.5)
GLUCOSE SERPL-MCNC: 145 MG/DL (ref 70–99)
HCT VFR BLD AUTO: 45.5 % (ref 37.5–51)
HDLC SERPL-MCNC: 36 MG/DL
HGB BLD-MCNC: 15.1 G/DL (ref 13–17.7)
IMM GRANULOCYTES # BLD AUTO: 0 X10E3/UL (ref 0–0.1)
IMM GRANULOCYTES NFR BLD AUTO: 0 %
LDLC SERPL CALC-MCNC: 83 MG/DL (ref 0–99)
LYMPHOCYTES # BLD AUTO: 1.6 X10E3/UL (ref 0.7–3.1)
LYMPHOCYTES NFR BLD AUTO: 16 %
MCH RBC QN AUTO: 29.5 PG (ref 26.6–33)
MCHC RBC AUTO-ENTMCNC: 33.2 G/DL (ref 31.5–35.7)
MCV RBC AUTO: 89 FL (ref 79–97)
MONOCYTES # BLD AUTO: 0.8 X10E3/UL (ref 0.1–0.9)
MONOCYTES NFR BLD AUTO: 8 %
NEUTROPHILS # BLD AUTO: 7.6 X10E3/UL (ref 1.4–7)
NEUTROPHILS NFR BLD AUTO: 73 %
PLATELET # BLD AUTO: 194 X10E3/UL (ref 150–450)
POTASSIUM SERPL-SCNC: 5 MMOL/L (ref 3.5–5.2)
PROT SERPL-MCNC: 6.9 G/DL (ref 6–8.5)
RBC # BLD AUTO: 5.11 X10E6/UL (ref 4.14–5.8)
SODIUM SERPL-SCNC: 140 MMOL/L (ref 134–144)
TRIGL SERPL-MCNC: 156 MG/DL (ref 0–149)
VLDLC SERPL CALC-MCNC: 27 MG/DL (ref 5–40)
WBC # BLD AUTO: 10.3 X10E3/UL (ref 3.4–10.8)

## 2022-10-06 NOTE — PROGRESS NOTES
Medicare Wellness Exam:    Chief Complaint   Patient presents with    Annual Wellness Visit     Medicare     he is a 78y.o. year old male who presents for evaluation for their Medicare Wellness Visit. Fall Screen is completed and assessed=yes. Depression Screen is completed and assessed=yes  Medication list reviewed and adjusted for accuracy=yes  Immunizations reviewed and updated=yes  Health/Preventative Screenings reviewed and updated=yes  ADL Functions reviewed=yes  MiniCog Score= 4/5 (2 points for clock and 2/3 points for recall)   See scanned medicare wellness documents for full details. Patient Active Problem List    Diagnosis    PAD (peripheral artery disease) ([facility])    Acute cognitive decline    Opioid use, unspecified with unspecified opioid-induced disorder    OA (osteoarthritis) of knee    S/P total knee replacement, left    Osteoarthritis of left knee     Follows with orthopedic surgery      Polyneuropathy, unspecified    Hereditary and idiopathic neuropathy, unspecified    Vitamin D deficiency    Chronic kidney disease, stage 3 ([facility])     stage 3a mild to moderate loss of kidney function      Type 2 diabetes mellitus with hyperglycemia, without long-term current use of insulin ([facility])    Essential hypertension    Mixed hyperlipidemia    Coronary arteriosclerosis       Reviewed PmHx, RxHx, FmHx, SocHx, AllgHx and updated and dated in the chart. Review of Systems   All other systems reviewed and are negative. Objective:     Vitals:    10/05/22 1331   BP: 126/80   Pulse: 81   Temp: 96.9 °F (36.1 °C)   TempSrc: Temporal   SpO2: 98%   Weight: 171 lb 12.8 oz (77.9 kg)   Height: 5' 8\" (1.727 m)     Physical Exam  Vitals reviewed. HENT:      Head: Normocephalic and atraumatic. Cardiovascular:      Rate and Rhythm: Normal rate and regular rhythm. Heart sounds: Normal heart sounds. Pulmonary:      Effort: Pulmonary effort is normal.      Breath sounds: Normal breath sounds. Neurological:      Mental Status: He is oriented to person, place, and time. Psychiatric:         Behavior: Behavior normal.        Assessment/ Plan:   Diagnoses and all orders for this visit:    1. Medicare annual wellness visit, subsequent    2. Essential hypertension  -     CBC WITH AUTOMATED DIFF  -     METABOLIC PANEL, COMPREHENSIVE  -     LIPID PANEL    3. Coronary arteriosclerosis  -     CBC WITH AUTOMATED DIFF  -     METABOLIC PANEL, COMPREHENSIVE  -     LIPID PANEL    4. Mixed hyperlipidemia  -     CBC WITH AUTOMATED DIFF  -     METABOLIC PANEL, COMPREHENSIVE  -     LIPID PANEL    5. Vitamin D deficiency  -     VITAMIN D, 25 HYDROXY    6. PAD (peripheral artery disease) (HCC)         -Pain evaluation performed in office  -Cognitive Screen performed in office  -Depression Screen, Fall risks  and ADL functionality were addressed  -Medication list updated and reviewed for any changes   -A comprehensive review of medical issues and a plan was formulated  -End of life planning was addressed with pt   -Health Screenings for preventions were addressed and a plan was formulated  -Shingles Vaccine was recommended  -Discussed with patient cancer risk factors and appropriate screenings for age  -Patient evaluated for colonoscopy and referred if needed per screeing criteria  -Labs from previous visits were discussed with patient   -Discussed with patient diet and exercise and formulated a plan as needed  -Advanced care planning was addressed with the patient.  -Alcohol screening performed and was negative    -  Follow-up and Dispositions    Return in about 6 months (around 4/5/2023) for Chronic Conditions. I have discussed the diagnosis with the patient and the intended plan as seen in the above orders. The patient understands and agrees with the plan. The patient has received an after-visit summary and questions were answered concerning future plans.      Medication Side Effects and Warnings were discussed with patien  Patient Labs were reviewed and or requested  Patient Past Records were reviewed and or requested    There are no Patient Instructions on file for this visit.       Donald Victoria MD

## 2022-10-16 NOTE — PROGRESS NOTES
Please call patient discuss lab results:    Kidney function has just mildly worsened compared to 4 months ago, but similar to previous results. Currently in CKD stage III. We will continue to monitor. At this point important to manage risk factors including diabetes and hypertension.     Triglycerides are just mildly elevated otherwise, this was essentially normal.    The rest of his labs are essentially normal.

## 2023-01-12 ENCOUNTER — OFFICE VISIT (OUTPATIENT)
Dept: ENDOCRINOLOGY | Age: 83
End: 2023-01-12
Payer: MEDICARE

## 2023-01-12 VITALS
WEIGHT: 170.06 LBS | SYSTOLIC BLOOD PRESSURE: 153 MMHG | HEART RATE: 91 BPM | BODY MASS INDEX: 25.77 KG/M2 | HEIGHT: 68 IN | TEMPERATURE: 97.1 F | DIASTOLIC BLOOD PRESSURE: 87 MMHG | OXYGEN SATURATION: 96 %

## 2023-01-12 DIAGNOSIS — E16.2 HYPOGLYCEMIA: ICD-10-CM

## 2023-01-12 DIAGNOSIS — E11.65 TYPE 2 DIABETES MELLITUS WITH HYPERGLYCEMIA, WITHOUT LONG-TERM CURRENT USE OF INSULIN (HCC): Primary | ICD-10-CM

## 2023-01-12 DIAGNOSIS — S11.90XA OPEN WOUND OF NECK, INITIAL ENCOUNTER: ICD-10-CM

## 2023-01-12 LAB — HBA1C MFR BLD HPLC: 6.3 %

## 2023-01-12 RX ORDER — INSULIN GLARGINE 100 [IU]/ML
18 INJECTION, SOLUTION SUBCUTANEOUS
Qty: 30 ML | Refills: 0 | Status: SHIPPED | OUTPATIENT
Start: 2023-01-12

## 2023-01-12 RX ORDER — SULFAMETHOXAZOLE AND TRIMETHOPRIM 800; 160 MG/1; MG/1
1 TABLET ORAL 2 TIMES DAILY
Qty: 20 TABLET | Refills: 0 | Status: SHIPPED | OUTPATIENT
Start: 2023-01-12

## 2023-01-12 NOTE — PROGRESS NOTES
Jose L Denton MD            Patient Information  Date:1/12/2023  Name : Mayra Abbott 78 y.o.     YOB: 1940               Chief Complaint   Patient presents with    Diabetes       History of Present Illness: Mayra Abbott is a 78 y.o. male here for follow-up of  Type 2 Diabetes Mellitus. Self-referred for evaluation management of type 2 diabetes mellitus. He was managed by Dr. Davina Mccall in the past  1/12/23  Lantus 24 units -   No metformin  Not eating as much, lost weight  No N/V, diarrhea  Not as active  Eats ice cream every night  Open area on L neck, started swelling 3 weeks ago, opened up a week ago. Prior history    Eating chocolate, ice cream at bedtime,  Diabetes was diagnosed in 2015, complains of tingling, pain in the feet. Evaluated by Dr. Roshan Hinojosa: Has peripheral neuropathy, sciatic radiculopathy, left leg weakness  Qualified for extra benefits based on  disability benefits    Eats 3 meals a day,          Wt Readings from Last 3 Encounters:   01/12/23 170 lb 1 oz (77.1 kg)   10/05/22 171 lb 12.8 oz (77.9 kg)   06/12/22 181 lb 7 oz (82.3 kg)       BP Readings from Last 3 Encounters:   01/12/23 (!) 153/87   10/05/22 126/80   06/14/22 113/69           Past Medical History:   Diagnosis Date    Cataract     Chronic kidney disease, stage 3 ([address] 7/24/2019    stage 3a mild to moderate loss of kidney function    Coronary arteriosclerosis 6/29/2017    Coronary artery disease     Hyperlipidemia     Hypertension     Neuropathy     Type 2 diabetes mellitus (HCC)     Vitamin D deficiency      Current Outpatient Medications   Medication Sig    donepeziL (ARICEPT) 10 mg tablet TAKE 1/2 TABLET EVERY DAY X 2 WEEKS THEN TAKE 1 TABLET BY MOUTH EVERY DAY    Blood-Glucose Meter (FreeStyle Lite Meter) monitoring kit Use to check blood glucose level 3 times daily.  DX E11.65    glucose blood VI test strips (FreeStyle Lite Strips) strip Use to check blood glucose level 3 times daily. DX E11.65    ezetimibe (ZETIA) 10 mg tablet TAKE 1 TABLET BY MOUTH DAILY    lisinopril-hydroCHLOROthiazide (PRINZIDE, ZESTORETIC) 20-12.5 mg per tablet TAKE 1 TABLET BY MOUTH DAILY    insulin glargine (Lantus Solostar U-100 Insulin) 100 unit/mL (3 mL) inpn 24 Units by SubCUTAneous route nightly. metoprolol succinate (TOPROL-XL) 100 mg tablet Take 100 mg by mouth daily. lancets misc USE TO TEST BLOOD SUGAR TWICE DAILY Dx Code: E11.65    cyanocobalamin (VITAMIN B12) 500 mcg tablet Take 500 mcg by mouth daily. Insulin Needles, Disposable, (ALICE PEN NEEDLE) 32 gauge x 5/32\" ndle Use to inject Lantus nightly. DX Code: E11.65    ergocalciferol (ERGOCALCIFEROL) 1,250 mcg (50,000 unit) capsule Take 5,000 Units by mouth every seven (7) days. glucose blood VI test strips (ASCENSIA AUTODISC VI, ONE TOUCH ULTRA TEST VI) strip Use to check blood glucose level 3 times daily. DX E11.65 (Patient not taking: No sig reported)    glucose blood VI test strips (Accu-Chek Patricia Plus test strp) strip Use to check BG 3 times daily. Dx code E11.65 (Patient not taking: No sig reported)    carbamide peroxide (Debrox) 6.5 % otic solution Administer 5 Drops into each ear two (2) times a day. (Patient not taking: No sig reported)    ondansetron (ZOFRAN ODT) 4 mg disintegrating tablet Take 1 Tablet by mouth every eight (8) hours as needed for Nausea, Vomiting or Nausea or Vomiting for up to 20 doses. (Patient not taking: No sig reported)    metFORMIN ER (GLUCOPHAGE XR) 500 mg tablet Take 2 Tablets by mouth two (2) times a day. (Patient not taking: Reported on 1/12/2023)    DULoxetine (CYMBALTA) 20 mg capsule TAKE 1 CAPSULE BY MOUTH EVERY DAY (Patient not taking: No sig reported)     No current facility-administered medications for this visit.      No Known Allergies    Review of Systems: Per HPI    Physical Examination:   Blood pressure (!) 153/87, pulse 91, temperature 97.1 °F (36.2 °C), temperature source Temporal, height 5' 8\" (1.727 m), weight 170 lb 1 oz (77.1 kg), SpO2 96 %. Estimated body mass index is 25.86 kg/m² as calculated from the following:    Height as of this encounter: 5' 8\" (1.727 m). Weight as of this encounter: 170 lb 1 oz (77.1 kg).   General: pleasant, no distress, good eye contact  HEENT: no exophthalmos, no periorbital edema, EOMI  Neck: No visible thyromegaly, 3 cm open wound, surrounding erythema, edema  RS: Normal respiratory effort  Musculoskeletal: no tremors  Neurological: alert and oriented  Psychiatric: normal mood and affect  Skin: Normal color    Diabetic foot exam: March 2019    Left Foot:   Visual Exam: callous - Great toe   Pulse DP: 1+ (weak)   Filament test: reduced sensation    Vibratory sensation: diminished      Right Foot:   Visual Exam: normal    Pulse DP: 1+ (weak)   Filament test: reduced sensation    Vibratory sensation: diminished  Diabetic Foot and Eye Exam HM Status   Topic Date Due    Eye Exam  Never done    Diabetic Foot Care  08/09/2023         Data Reviewed:       Lab Results   Component Value Date/Time    Hemoglobin A1c 7.1 (H) 04/05/2022 11:08 AM    Hemoglobin A1c 7.9 (H) 11/29/2021 12:00 AM    Hemoglobin A1c 7.6 (H) 07/16/2021 12:00 AM    Hemoglobin A1c, External 6.9 10/29/2019 12:00 AM    Hemoglobin A1c, External 6.9 10/28/2019 12:00 AM    Glucose 145 (H) 10/05/2022 02:32 PM    Glucose (POC) 151 (H) 06/14/2022 11:16 AM    Microalbumin/Creat ratio (mg/g creat) 5 04/05/2022 11:08 AM    Microalbumin,urine random 0.90 04/05/2022 11:08 AM    LDL,Direct 97 04/05/2022 11:08 AM    LDL, calculated 83 10/05/2022 02:32 PM    LDL, calculated 48.4 06/12/2022 08:28 AM    Creatinine 1.31 (H) 10/05/2022 02:32 PM      Lab Results   Component Value Date/Time    GFR est non-AA >60 06/12/2022 08:28 AM    GFR est AA >60 06/12/2022 08:28 AM    Creatinine 1.31 (H) 10/05/2022 02:32 PM    BUN 15 10/05/2022 02:32 PM    Sodium 140 10/05/2022 02:32 PM    Potassium 5.0 10/05/2022 02:32 PM    Chloride 105 10/05/2022 02:32 PM    CO2 20 10/05/2022 02:32 PM    Magnesium 1.8 06/12/2022 08:28 AM       Assessment/Plan:     1. Type 2 diabetes mellitus with hyperglycemia, without long-term current use of insulin (HCC)        1. Type 2 Diabetes Mellitus   Lab Results   Component Value Date/Time    Hemoglobin A1c 7.1 (H) 04/05/2022 11:08 AM    Hemoglobin A1c (POC) 6.6 10/09/2020 10:03 AM    Hemoglobin A1c, External 6.9 10/29/2019 12:00 AM   Controlled  Lantus 18 units, was on 24 units  Annual eye exam, foot care  On high carb diet  Self stopped metformin    2. HTN : Continue current therapy     3. Hyperlipidemia : zetia     4. CAD  No calf claudication    5. Peripheral neuropathy: Followed by neurology    Left neck wound: With inflammation, possible infection  Bactrim for 10 days  Follow-up with PCP in 1 week to 10 days    There are no Patient Instructions on file for this visit. Thank you for allowing me to participate in the care of this patient. Aicha Davila MD      Patient verbalized understanding     Voice-recognition software was used to generate this report, which may result in some phonetic-based errors in the grammar and contents. Even though attempts were made to correct all the mistakes, some may have been missed and remained in the body of the report. fever

## 2023-01-12 NOTE — PROGRESS NOTES
Jona Aguilar is a 78 y.o. male here for   Chief Complaint   Patient presents with    Diabetes       1. Have you been to the ER, urgent care clinic since your last visit? Hospitalized since your last visit? - No     2. Have you seen or consulted any other health care providers outside of the [address] [place] [address] since your last visit?   Include any pap smears or colon screening.- No

## 2023-01-12 NOTE — LETTER
1/12/2023    Patient: Charu Dunn   YOB: 1940   Date of Visit: 1/12/2023     Zakiya Art MD  [address] [facility],[address], [facility]    Dear Zakiya Art MD,      Thank you for referring Mr. Laura Tapia to [address] [place] [address] for evaluation. My notes for this consultation are attached. If you have questions, please do not hesitate to call me. I look forward to following your patient along with you.       Sincerely,    Adina Parnell MD

## 2023-01-18 DIAGNOSIS — E78.2 MIXED HYPERLIPIDEMIA: ICD-10-CM

## 2023-01-18 DIAGNOSIS — I25.10 CORONARY ARTERIOSCLEROSIS: ICD-10-CM

## 2023-01-18 RX ORDER — EZETIMIBE 10 MG/1
TABLET ORAL
Qty: 90 TABLET | Refills: 1 | Status: SHIPPED | OUTPATIENT
Start: 2023-01-18

## 2023-01-19 ENCOUNTER — OFFICE VISIT (OUTPATIENT)
Dept: PRIMARY CARE CLINIC | Age: 83
End: 2023-01-19
Payer: MEDICARE

## 2023-01-19 VITALS
WEIGHT: 166.4 LBS | TEMPERATURE: 97.4 F | HEART RATE: 96 BPM | SYSTOLIC BLOOD PRESSURE: 127 MMHG | DIASTOLIC BLOOD PRESSURE: 82 MMHG | BODY MASS INDEX: 25.22 KG/M2 | OXYGEN SATURATION: 98 % | RESPIRATION RATE: 18 BRPM | HEIGHT: 68 IN

## 2023-01-19 DIAGNOSIS — R42 DIZZY SPELLS: ICD-10-CM

## 2023-01-19 DIAGNOSIS — L02.11 ABSCESS, NECK: Primary | ICD-10-CM

## 2023-01-19 NOTE — PROGRESS NOTES
Mac Baig (: 1940) is a 78 y.o. male, established patient, here for evaluation of the following chief complaint(s): Wound Check (Wound check-neck past 3 weeks)       ASSESSMENT/PLAN:  Below is the assessment and plan developed based on review of pertinent history, physical exam, labs, studies, and medications. 1. Abscess, neck  Acute  Abscess deroofed on own and drained. Patient does not wish to complete antibiotics, reasonable to discontinue given that abscess has drained. Patient educated regarding expectant healing time. Any new issues, patient asked to call office. 2. Dizzy spells  Chronic  Multifactorial, polypharmacy versus poor p.o. hydration versus orthostasis. Encouraged patient to improve hydration. Sit to stand slowly. If no better, patient to follow-up promptly. Return in about 3 months (around 2023) for chronic care follow up. SUBJECTIVE/OBJECTIVE:  HPI    78-year-old male past medical history notable for hypertension, diabetes mellitus non-insulin-dependent, diabetic polyneuropathy, chronic kidney disease stage III, hyperlipidemia, mild neurocognitive impairment presents with family member for abscess follow-up. Patient states just before the holidays, he noted a small swelling on the left neck laterally. Patient states area grew in size to about 4 cm. Patient states it was very tender. Patient states he cannot move his neck. He saw Dr. Gris Duvall and an infection was suspected. Patient was started on Bactrim. Did not finish it because he was having dizziness. Patient states he has dizziness every morning when standing. Admits he is not hydrating adequately. Diabetes is well controlled. Endocrinology is following and his last A1c was 6.3%. Patient states a few days into taking antibiotics, area on the neck ruptured expressing thick purulent material.  The wound has scabbed over and patient states it is no longer tender.       No Known Allergies  Current Outpatient Medications   Medication Sig    ezetimibe (ZETIA) 10 mg tablet TAKE 1 TABLET BY MOUTH DAILY    insulin glargine (Lantus Solostar U-100 Insulin) 100 unit/mL (3 mL) inpn 18 Units by SubCUTAneous route nightly. donepeziL (ARICEPT) 10 mg tablet TAKE 1/2 TABLET EVERY DAY X 2 WEEKS THEN TAKE 1 TABLET BY MOUTH EVERY DAY    Blood-Glucose Meter (FreeStyle Lite Meter) monitoring kit Use to check blood glucose level 3 times daily. DX E11.65    glucose blood VI test strips (FreeStyle Lite Strips) strip Use to check blood glucose level 3 times daily. DX E11.65    lisinopril-hydroCHLOROthiazide (PRINZIDE, ZESTORETIC) 20-12.5 mg per tablet TAKE 1 TABLET BY MOUTH DAILY    metoprolol succinate (TOPROL-XL) 100 mg tablet Take 100 mg by mouth daily. lancets misc USE TO TEST BLOOD SUGAR TWICE DAILY Dx Code: E11.65    cyanocobalamin (VITAMIN B12) 500 mcg tablet Take 500 mcg by mouth daily. Insulin Needles, Disposable, (ALICE PEN NEEDLE) 32 gauge x 5/32\" ndle Use to inject Lantus nightly. DX Code: E11.65    ergocalciferol (ERGOCALCIFEROL) 1,250 mcg (50,000 unit) capsule Take 5,000 Units by mouth every seven (7) days. No current facility-administered medications for this visit.      Past Medical History:   Diagnosis Date    Cataract     Chronic kidney disease, stage 3 ([address] 7/24/2019    stage 3a mild to moderate loss of kidney function    Coronary arteriosclerosis 6/29/2017    Coronary artery disease     Hyperlipidemia     Hypertension     Neuropathy     Type 2 diabetes mellitus (HCC)     Vitamin D deficiency      Past Surgical History:   Procedure Laterality Date    HX CATARACT REMOVAL Bilateral     HX COLONOSCOPY  2005    HX KNEE REPLACEMENT Left      Family History   Problem Relation Age of Onset    Cancer Mother         stomach    Heart Disease Father     Hypertension Father     Cancer Brother         brain tumor     Social History     Tobacco Use   Smoking Status Former    Packs/day: 2.00 Years: 20.00    Pack years: 40.00    Types: Cigarettes    Quit date: 26    Years since quittin.0   Smokeless Tobacco Never         Review of Systems   All other systems reviewed and are negative. /82 (BP 1 Location: Right upper arm, BP Patient Position: Sitting, BP Cuff Size: Large adult)   Pulse 96   Temp 97.4 °F (36.3 °C) (Temporal)   Resp 18   Ht 5' 8\" (1.727 m)   Wt 166 lb 6.4 oz (75.5 kg)   SpO2 98%   BMI 25.30 kg/m²    Physical Exam  Vitals reviewed. HENT:      Head: Normocephalic and atraumatic. Neck:      Comments: Scabbed over wound left lateral neck, nontender, nonerythematous, no streaking. Cardiovascular:      Rate and Rhythm: Normal rate and regular rhythm. Pulses: Normal pulses. Heart sounds: Normal heart sounds. Pulmonary:      Effort: Pulmonary effort is normal.      Breath sounds: Normal breath sounds. Neurological:      Mental Status: He is alert. Mental status is at baseline. Cranial Nerves: Cranial nerves 2-12 are intact. Sensory: Sensation is intact. Motor: Motor function is intact. Coordination: Coordination is intact. Gait: Gait is intact. Deep Tendon Reflexes: Reflexes are normal and symmetric. Psychiatric:         Mood and Affect: Mood normal.           On this date 2023 I have spent 30 minutes reviewing previous notes, test results and face to face with the patient discussing the diagnosis and importance of compliance with the treatment plan as well as documenting on the day of the visit. An electronic signature was used to authenticate this note.   -- Stephanie Unger, MD [id number]

## 2023-01-19 NOTE — PROGRESS NOTES
Identified Patient with two Patient identifiers(name and ). 1. \"Have you been to the ER, urgent care clinic since your last visit? Hospitalized since your last visit? \" No    2. \"Have you seen or consulted any other health care providers outside of the [address] [place] [address] since your last visit? \" No     3. For patients aged 39-70: Has the patient had a colonoscopy / FIT/ Cologuard? NA - based on age      If the patient is female:    4. For patients aged 41-77: Has the patient had a mammogram within the past 2 years? NA - based on age or sex      11. For patients aged 21-65: Has the patient had a pap smear?  NA - based on age or sex     Visit Vitals  /82 (BP 1 Location: Right upper arm, BP Patient Position: Sitting, BP Cuff Size: Large adult)   Pulse 96   Temp 97.4 °F (36.3 °C) (Temporal)   Resp 18   Ht 5' 8\" (1.727 m)   Wt 166 lb 6.4 oz (75.5 kg)   SpO2 98%   BMI 25.30 kg/m²       Chief Complaint   Patient presents with    Wound Check     Wound check-neck past 3 weeks       Health Maintenance Due   Topic Date Due    Pneumococcal 65+ years (1 - PCV) Never done    Eye Exam Retinal or Dilated  Never done    Shingles Vaccine (1 of 2) Never done    DTaP/Tdap/Td series (1 - Tdap) 2001    COVID-19 Vaccine (2 - Pfizer series) 2021    Flu Vaccine (1) Never done

## 2023-01-31 DIAGNOSIS — E11.65 TYPE 2 DIABETES MELLITUS WITH HYPERGLYCEMIA, WITHOUT LONG-TERM CURRENT USE OF INSULIN (HCC): ICD-10-CM

## 2023-01-31 RX ORDER — METFORMIN HYDROCHLORIDE 500 MG/1
TABLET, EXTENDED RELEASE ORAL
Qty: 360 TABLET | Refills: 3 | Status: SHIPPED | OUTPATIENT
Start: 2023-01-31

## 2023-04-19 DIAGNOSIS — M25.562 LEFT KNEE PAIN, UNSPECIFIED CHRONICITY: Primary | ICD-10-CM

## 2023-04-19 PROCEDURE — 73562 X-RAY EXAM OF KNEE 3: CPT | Performed by: ORTHOPAEDIC SURGERY

## 2023-04-22 DIAGNOSIS — E11.65 TYPE 2 DIABETES MELLITUS WITH HYPERGLYCEMIA, WITHOUT LONG-TERM CURRENT USE OF INSULIN (HCC): Primary | ICD-10-CM

## 2023-04-23 DIAGNOSIS — E11.65 TYPE 2 DIABETES MELLITUS WITH HYPERGLYCEMIA, WITHOUT LONG-TERM CURRENT USE OF INSULIN (HCC): Primary | ICD-10-CM

## 2023-04-26 DIAGNOSIS — M25.562 LEFT KNEE PAIN, UNSPECIFIED CHRONICITY: Primary | ICD-10-CM

## 2023-04-28 ENCOUNTER — OFFICE VISIT (OUTPATIENT)
Age: 83
End: 2023-04-28

## 2023-04-28 VITALS — WEIGHT: 166 LBS | HEIGHT: 68 IN | BODY MASS INDEX: 25.16 KG/M2

## 2023-04-28 DIAGNOSIS — M25.562 LEFT KNEE PAIN, UNSPECIFIED CHRONICITY: Primary | ICD-10-CM

## 2023-04-28 DIAGNOSIS — M17.11 OSTEOARTHRITIS OF RIGHT KNEE, UNSPECIFIED OSTEOARTHRITIS TYPE: ICD-10-CM

## 2023-04-28 RX ORDER — GABAPENTIN 100 MG/1
CAPSULE ORAL
COMMUNITY
Start: 2023-03-08

## 2023-04-28 RX ORDER — LISINOPRIL AND HYDROCHLOROTHIAZIDE 20; 12.5 MG/1; MG/1
TABLET ORAL
COMMUNITY
Start: 2022-01-31

## 2023-04-28 RX ORDER — INSULIN GLARGINE 100 [IU]/ML
INJECTION, SOLUTION SUBCUTANEOUS
COMMUNITY
Start: 2022-01-31

## 2023-04-28 RX ORDER — CEPHALEXIN 500 MG/1
CAPSULE ORAL
COMMUNITY
Start: 2022-03-08

## 2023-04-28 RX ORDER — DONEPEZIL HYDROCHLORIDE 10 MG/1
TABLET, FILM COATED ORAL
COMMUNITY
Start: 2022-07-27

## 2023-04-28 RX ORDER — EZETIMIBE 10 MG/1
TABLET ORAL
COMMUNITY
Start: 2022-07-22

## 2023-04-28 RX ORDER — GABAPENTIN 100 MG/1
CAPSULE ORAL
COMMUNITY

## 2023-05-03 DIAGNOSIS — M25.562 LEFT KNEE PAIN, UNSPECIFIED CHRONICITY: ICD-10-CM

## 2023-05-22 ENCOUNTER — TELEMEDICINE (OUTPATIENT)
Dept: PRIMARY CARE CLINIC | Facility: CLINIC | Age: 83
End: 2023-05-22
Payer: MEDICARE

## 2023-05-22 DIAGNOSIS — R41.89 NEUROCOGNITIVE DEFICITS: ICD-10-CM

## 2023-05-22 DIAGNOSIS — E11.65 TYPE 2 DIABETES MELLITUS WITH HYPERGLYCEMIA, WITH LONG-TERM CURRENT USE OF INSULIN (HCC): ICD-10-CM

## 2023-05-22 DIAGNOSIS — I10 ESSENTIAL HYPERTENSION: Primary | ICD-10-CM

## 2023-05-22 DIAGNOSIS — I73.9 PAD (PERIPHERAL ARTERY DISEASE) (HCC): ICD-10-CM

## 2023-05-22 DIAGNOSIS — I10 ESSENTIAL HYPERTENSION: ICD-10-CM

## 2023-05-22 DIAGNOSIS — G62.9 POLYNEUROPATHY, UNSPECIFIED: ICD-10-CM

## 2023-05-22 DIAGNOSIS — E78.2 MIXED HYPERLIPIDEMIA: ICD-10-CM

## 2023-05-22 DIAGNOSIS — R29.818 NEUROCOGNITIVE DEFICITS: ICD-10-CM

## 2023-05-22 DIAGNOSIS — N18.31 CHRONIC KIDNEY DISEASE, STAGE 3A (HCC): ICD-10-CM

## 2023-05-22 DIAGNOSIS — Z79.4 TYPE 2 DIABETES MELLITUS WITH HYPERGLYCEMIA, WITH LONG-TERM CURRENT USE OF INSULIN (HCC): ICD-10-CM

## 2023-05-22 DIAGNOSIS — R19.5 LOOSE STOOLS: ICD-10-CM

## 2023-05-22 DIAGNOSIS — R15.2 FECAL URGENCY: ICD-10-CM

## 2023-05-22 PROCEDURE — 1123F ACP DISCUSS/DSCN MKR DOCD: CPT | Performed by: FAMILY MEDICINE

## 2023-05-22 PROCEDURE — G0439 PPPS, SUBSEQ VISIT: HCPCS | Performed by: FAMILY MEDICINE

## 2023-05-22 RX ORDER — LOPERAMIDE HYDROCHLORIDE 2 MG/1
2 CAPSULE ORAL 4 TIMES DAILY PRN
Qty: 15 CAPSULE | Refills: 0 | Status: SHIPPED | OUTPATIENT
Start: 2023-05-22 | End: 2023-05-27

## 2023-05-22 NOTE — PROGRESS NOTES
Job Morton (:  1940) is a Established patient, presenting virtually for evaluation of the following: Chronic care follow-up    Assessment & Plan   Below is the assessment and plan developed based on review of pertinent history, physical exam, labs, studies, and medications. 1. Essential hypertension  Chronic  -     CBC; Future  Control unknown, continue lisinopril-hydrochlorothiazide 20-12.5 mg daily. Dietary salt restriction. Patient to keep food diary to determine if certain foods are reason for symptomatology. Stay physically active as tolerated. 2. Chronic kidney disease, stage 3a (HCC)  Chronic  -     CBC; Future  -     Protein / creatinine ratio, urine; Future  3. Mixed hyperlipidemia  Chronic  Due for labs, endocrinology ordered. Continue Zetia 10 mg q. daily. 4. Type 2 diabetes mellitus with hyperglycemia, with long-term current use of insulin ([facility])  Chronic  Controlled. Last hemoglobin A1c 6.3%. Followed by endocrinology. 5. Polyneuropathy, unspecified  Chronic  Stable. On gabapentin by neurology. 6. PAD (peripheral artery disease) ([facility])  Chronic  Followed by Dr. Preethi Waldron. RPM blood pressure monitor. Stressed compliance with monitoring. 7. Neurocognitive deficits  Chronic  Mild. Followed by neurology. 8. Loose stools  Acute  -     loperamide (IMODIUM) 2 MG capsule; Take 1 capsule by mouth 4 times daily as needed for Diarrhea, Disp-15 capsule, R-0Normal  Doubt infectious, possibly food triggered. Patient denies consuming dairy. Patient to keep food diary. Call office if worsening or persistent. 9. Fecal urgency  Acute    Return in about 5 months (around 10/22/2023) for medicare wellness. Subjective   HPI    78-year-old male past medical history hypertension, CKD stage IIIa, hyperlipidemia, type 2 diabetes mellitus on insulin, polyneuropathy, PAD, mild cognitive impairment seen via telemedicine for chronic care follow-up.     Patient states this morning he is

## 2023-05-22 NOTE — PATIENT INSTRUCTIONS
Personalized Preventive Plan for Linda De Guzman - 5/22/2023  Medicare offers a range of preventive health benefits. Some of the tests and screenings are paid in full while other may be subject to a deductible, co-insurance, and/or copay. Some of these benefits include a comprehensive review of your medical history including lifestyle, illnesses that may run in your family, and various assessments and screenings as appropriate. After reviewing your medical record and screening and assessments performed today your provider may have ordered immunizations, labs, imaging, and/or referrals for you. A list of these orders (if applicable) as well as your Preventive Care list are included within your After Visit Summary for your review. Other Preventive Recommendations:    A preventive eye exam performed by an eye specialist is recommended every 1-2 years to screen for glaucoma; cataracts, macular degeneration, and other eye disorders. A preventive dental visit is recommended every 6 months. Try to get at least 150 minutes of exercise per week or 10,000 steps per day on a pedometer . Order or download the FREE \"Exercise & Physical Activity: Your Everyday Guide\" from The [employer]. Call 1-794.586.2373 or search The [employer] online. You need 9944-7739 mg of calcium and 4980-2311 IU of vitamin D per day. It is possible to meet your calcium requirement with diet alone, but a vitamin D supplement is usually necessary to meet this goal.  When exposed to the sun, use a sunscreen that protects against both UVA and UVB radiation with an SPF of 30 or greater. Reapply every 2 to 3 hours or after sweating, drying off with a towel, or swimming. Always wear a seat belt when traveling in a car. Always wear a helmet when riding a bicycle or motorcycle.

## 2023-06-22 ENCOUNTER — OFFICE VISIT (OUTPATIENT)
Dept: PRIMARY CARE CLINIC | Facility: CLINIC | Age: 83
End: 2023-06-22

## 2023-06-22 VITALS
TEMPERATURE: 98.5 F | BODY MASS INDEX: 28.52 KG/M2 | DIASTOLIC BLOOD PRESSURE: 67 MMHG | HEIGHT: 65 IN | RESPIRATION RATE: 18 BRPM | SYSTOLIC BLOOD PRESSURE: 129 MMHG | OXYGEN SATURATION: 98 % | HEART RATE: 85 BPM | WEIGHT: 171.2 LBS

## 2023-06-22 DIAGNOSIS — M75.01 ADHESIVE CAPSULITIS OF RIGHT SHOULDER: ICD-10-CM

## 2023-06-22 DIAGNOSIS — M75.02 ADHESIVE CAPSULITIS OF LEFT SHOULDER: Primary | ICD-10-CM

## 2023-06-22 RX ORDER — TRIAMCINOLONE ACETONIDE 40 MG/ML
40 INJECTION, SUSPENSION INTRA-ARTICULAR; INTRAMUSCULAR ONCE
Status: COMPLETED | OUTPATIENT
Start: 2023-06-22 | End: 2023-06-22

## 2023-06-22 RX ADMIN — TRIAMCINOLONE ACETONIDE 40 MG: 40 INJECTION, SUSPENSION INTRA-ARTICULAR; INTRAMUSCULAR at 18:02

## 2023-06-22 RX ADMIN — TRIAMCINOLONE ACETONIDE 40 MG: 40 INJECTION, SUSPENSION INTRA-ARTICULAR; INTRAMUSCULAR at 18:00

## 2023-06-22 SDOH — ECONOMIC STABILITY: FOOD INSECURITY: WITHIN THE PAST 12 MONTHS, THE FOOD YOU BOUGHT JUST DIDN'T LAST AND YOU DIDN'T HAVE MONEY TO GET MORE.: NEVER TRUE

## 2023-06-22 SDOH — ECONOMIC STABILITY: INCOME INSECURITY: HOW HARD IS IT FOR YOU TO PAY FOR THE VERY BASICS LIKE FOOD, HOUSING, MEDICAL CARE, AND HEATING?: NOT HARD AT ALL

## 2023-06-22 SDOH — ECONOMIC STABILITY: HOUSING INSECURITY
IN THE LAST 12 MONTHS, WAS THERE A TIME WHEN YOU DID NOT HAVE A STEADY PLACE TO SLEEP OR SLEPT IN A SHELTER (INCLUDING NOW)?: NO

## 2023-06-22 SDOH — ECONOMIC STABILITY: FOOD INSECURITY: WITHIN THE PAST 12 MONTHS, YOU WORRIED THAT YOUR FOOD WOULD RUN OUT BEFORE YOU GOT MONEY TO BUY MORE.: NEVER TRUE

## 2023-06-22 NOTE — PROGRESS NOTES
Identified Patient with two Patient identifiers(name and ). 1. Have you been to the ER, urgent care clinic since your last visit? Hospitalized since your last visit? No    2. Have you seen or consulted any other health care providers outside of the [address] [place] [address] since your last visit? No     3. For patients aged 39-70: Has the patient had a colonoscopy / FIT/ Cologuard? No    If the patient is female:    4. For patients aged 41-77: Has the patient had a mammogram within the past 2 years? No      5. For patients aged 21-65: Has the patient had a pap smear? No   There were no vitals taken for this visit. Chief Complaint   Patient presents with    Other     Shoulder pain       Health Maintenance Due   Topic Date Due    Pneumococcal 65+ years Vaccine (1 - PCV) Never done    Shingles vaccine (1 of 2) Never done    DTaP/Tdap/Td vaccine (1 - Tdap) 2001    COVID-19 Vaccine (2 - Pfizer series) 2021          No questionnaires available.

## 2023-06-22 NOTE — PROGRESS NOTES
Nando Perkins (: 1940) is a 78 y.o. male, established patient, here for evaluation of the following chief complaint(s): Other (Shoulder pain)       ASSESSMENT/PLAN:  Below is the assessment and plan developed based on review of pertinent history, physical exam, labs, studies, and medications. 1. Adhesive capsulitis of left shoulder  Chronic  -     DRAIN/INJECT LARGE JOINT/BURSA  -     triamcinolone acetonide (KENALOG-40) injection 40 mg; 40 mg, Intra-artICUlar, ONCE, 1 dose, On Thu 23 at 1130  Preop diagnosis: Adhesive capsulitis left shoulder  Postop diagnosis: Same  Procedure: Joint injection    The area was prepped in the usual sterile manner. A 1-1/2 inch 22-gauge needle was inserted into the left shoulder using the posterior approach and 1 cc 40 mg Kenalog mixed with 4 cc 1% lidocaine was injected. There were no complications during this procedure. The patient tolerated procedure well without complications. Standard postprocedure care was explained and return precautions given. 2. Adhesive capsulitis of right shoulder  Chronic  -     DRAIN/INJECT LARGE JOINT/BURSA  -     triamcinolone acetonide (KENALOG-40) injection 40 mg; 40 mg, IntraMUSCular, ONCE, 1 dose, On Thu 23 at 1130  Pre-Op diagnosis: Adhesive capsulitis right shoulder  Postop diagnosis: Same  Procedure: Joint injection    The area was prepped in the usual sterile manner. A 1-1/2 inch 22-gauge needle was inserted into the right shoulder using the posterior approach and 1 cc 40 mg Kenalog mixed with 4 cc 1% lidocaine was injected. There were no complications during this procedure. The patient tolerated procedure well without complications. Standard postprocedure care was explained and return precautions given.         SUBJECTIVE/OBJECTIVE:  HPI    78-year-old male past medical history hypertension, PAD, type 2 diabetes, osteoarthritis, chronic kidney disease stage III seen in office today for bilateral shoulder

## 2023-10-17 ENCOUNTER — OFFICE VISIT (OUTPATIENT)
Dept: PRIMARY CARE CLINIC | Facility: CLINIC | Age: 83
End: 2023-10-17

## 2023-10-17 VITALS
BODY MASS INDEX: 27.63 KG/M2 | HEART RATE: 61 BPM | RESPIRATION RATE: 16 BRPM | HEIGHT: 65 IN | WEIGHT: 165.8 LBS | TEMPERATURE: 97.7 F | SYSTOLIC BLOOD PRESSURE: 114 MMHG | DIASTOLIC BLOOD PRESSURE: 61 MMHG | OXYGEN SATURATION: 97 %

## 2023-10-17 DIAGNOSIS — R39.12 BENIGN PROSTATIC HYPERPLASIA WITH WEAK URINARY STREAM: ICD-10-CM

## 2023-10-17 DIAGNOSIS — N40.1 BENIGN PROSTATIC HYPERPLASIA WITH WEAK URINARY STREAM: ICD-10-CM

## 2023-10-17 DIAGNOSIS — I10 ESSENTIAL HYPERTENSION: Primary | ICD-10-CM

## 2023-10-17 DIAGNOSIS — N18.31 STAGE 3A CHRONIC KIDNEY DISEASE (HCC): ICD-10-CM

## 2023-10-17 DIAGNOSIS — M75.01 ADHESIVE CAPSULITIS OF RIGHT SHOULDER: ICD-10-CM

## 2023-10-17 DIAGNOSIS — M75.02 ADHESIVE CAPSULITIS OF LEFT SHOULDER: ICD-10-CM

## 2023-10-17 DIAGNOSIS — E11.65 TYPE 2 DIABETES MELLITUS WITH HYPERGLYCEMIA, WITHOUT LONG-TERM CURRENT USE OF INSULIN (HCC): ICD-10-CM

## 2023-10-17 DIAGNOSIS — E78.2 MIXED HYPERLIPIDEMIA: ICD-10-CM

## 2023-10-17 RX ORDER — TRIAMCINOLONE ACETONIDE 40 MG/ML
40 INJECTION, SUSPENSION INTRA-ARTICULAR; INTRAMUSCULAR ONCE
Status: COMPLETED | OUTPATIENT
Start: 2023-10-17 | End: 2023-10-17

## 2023-10-17 RX ORDER — TAMSULOSIN HYDROCHLORIDE 0.4 MG/1
0.4 CAPSULE ORAL DAILY
Qty: 90 CAPSULE | Refills: 0 | Status: SHIPPED | OUTPATIENT
Start: 2023-10-17

## 2023-10-17 RX ADMIN — TRIAMCINOLONE ACETONIDE 40 MG: 40 INJECTION, SUSPENSION INTRA-ARTICULAR; INTRAMUSCULAR at 14:12

## 2023-10-17 RX ADMIN — TRIAMCINOLONE ACETONIDE 40 MG: 40 INJECTION, SUSPENSION INTRA-ARTICULAR; INTRAMUSCULAR at 14:15

## 2023-10-17 NOTE — PROGRESS NOTES
Arvind Nye (: 1940) is a 78 y.o. male, established patient, here for evaluation of the following chief complaint(s):  Follow-up (6 month)       ASSESSMENT/PLAN:  Below is the assessment and plan developed based on review of pertinent history, physical exam, labs, studies, and medications. 1. Essential hypertension  Chronic  Controlled, continue same management. 2. Stage 3a chronic kidney disease (HCC)  Chronic  Stable. 3. Mixed hyperlipidemia  Chronic  LDL goal less than 70 mg/dL. On Zetia 10 mg daily. Healthy diet advised. 4. Type 2 diabetes mellitus with hyperglycemia, without long-term current use of insulin ([address])  Chronic  Managed by endocrinology. 5.  BPH  Chronic  Start tamsulosin 0.4 mg daily. 6.  Adhesive capsulitis of left shoulder  -     DRAIN/INJECT LARGE JOINT/BURSA  -     triamcinolone acetonide (KENALOG-40) injection 40 mg; 40 mg, Intra-artICUlar, ONCE, 1 dose, On Tue 10/17/23 at 1430  Preop diagnosis: Adhesive capsulitis left shoulder  Postop diagnosis: Same  Procedure: Joint injection     The area was prepped in the usual sterile manner. A 1-1/2 inch 25-gauge needle was inserted into the left shoulder using the posterior approach and 1 cc 40 mg Kenalog mixed with 4 cc 1% lidocaine was injected. There were no complications during this procedure. The patient tolerated procedure well without complications. Standard postprocedure care was explained and return precautions given. 7. Adhesive capsulitis of right shoulder  -     DRAIN/INJECT LARGE JOINT/BURSA  -     triamcinolone acetonide (KENALOG-40) injection 40 mg; 40 mg, Intra-artICUlar, ONCE, 1 dose, On Tue 10/17/23 at 1430  Pre-Op diagnosis: Adhesive capsulitis right shoulder  Postop diagnosis: Same  Procedure: Joint injection     The area was prepped in the usual sterile manner.   A 1-1/2 inch 25-gauge needle was inserted into the right shoulder using the posterior approach and 1 cc 40 mg Kenalog mixed with 4 cc 1%

## 2023-10-17 NOTE — PROGRESS NOTES
Identified Patient with two Patient identifiers(name and ). 1. Have you been to the ER, urgent care clinic since your last visit? Hospitalized since your last visit? No    2. Have you seen or consulted any other health care providers outside of the [address] [place] [address] since your last visit? No     3. For patients aged 43-73: Has the patient had a colonoscopy / FIT/ Cologuard? NA - based on age/sex    If the patient is female:    4. For patients aged 43-66: Has the patient had a mammogram within the past 2 years? NA - based on age/sex      5. For patients aged 21-65: Has the patient had a pap smear? NA - based on age/sex   There were no vitals taken for this visit. Chief Complaint   Patient presents with    Follow-up     6 month       Health Maintenance Due   Topic Date Due    Pneumococcal 65+ years Vaccine (1 - PCV) Never done    Shingles vaccine (1 of 2) Never done    Hepatitis B vaccine (1 of 3 - Risk 3-dose series) Never done    DTaP/Tdap/Td vaccine (1 - Tdap) 2001    COVID-19 Vaccine (2 - Pfizer series) 2021    Flu vaccine (1) Never done          No questionnaires available.

## 2023-11-01 ENCOUNTER — OFFICE VISIT (OUTPATIENT)
Age: 83
End: 2023-11-01
Payer: MEDICARE

## 2023-11-01 VITALS
RESPIRATION RATE: 18 BRPM | OXYGEN SATURATION: 96 % | TEMPERATURE: 97.4 F | HEART RATE: 92 BPM | WEIGHT: 165.4 LBS | DIASTOLIC BLOOD PRESSURE: 66 MMHG | BODY MASS INDEX: 27.56 KG/M2 | HEIGHT: 65 IN | SYSTOLIC BLOOD PRESSURE: 142 MMHG

## 2023-11-01 DIAGNOSIS — E78.2 MIXED HYPERLIPIDEMIA: ICD-10-CM

## 2023-11-01 DIAGNOSIS — E11.65 TYPE 2 DIABETES MELLITUS WITH HYPERGLYCEMIA, WITHOUT LONG-TERM CURRENT USE OF INSULIN (HCC): Primary | ICD-10-CM

## 2023-11-01 DIAGNOSIS — I10 ESSENTIAL HYPERTENSION: ICD-10-CM

## 2023-11-01 LAB — HBA1C MFR BLD: 6.8 %

## 2023-11-01 PROCEDURE — 1036F TOBACCO NON-USER: CPT | Performed by: INTERNAL MEDICINE

## 2023-11-01 PROCEDURE — G8427 DOCREV CUR MEDS BY ELIG CLIN: HCPCS | Performed by: INTERNAL MEDICINE

## 2023-11-01 PROCEDURE — 83036 HEMOGLOBIN GLYCOSYLATED A1C: CPT | Performed by: INTERNAL MEDICINE

## 2023-11-01 PROCEDURE — 3078F DIAST BP <80 MM HG: CPT | Performed by: INTERNAL MEDICINE

## 2023-11-01 PROCEDURE — 3051F HG A1C>EQUAL 7.0%<8.0%: CPT | Performed by: INTERNAL MEDICINE

## 2023-11-01 PROCEDURE — 3074F SYST BP LT 130 MM HG: CPT | Performed by: INTERNAL MEDICINE

## 2023-11-01 PROCEDURE — G8419 CALC BMI OUT NRM PARAM NOF/U: HCPCS | Performed by: INTERNAL MEDICINE

## 2023-11-01 PROCEDURE — 1123F ACP DISCUSS/DSCN MKR DOCD: CPT | Performed by: INTERNAL MEDICINE

## 2023-11-01 PROCEDURE — G8484 FLU IMMUNIZE NO ADMIN: HCPCS | Performed by: INTERNAL MEDICINE

## 2023-11-01 PROCEDURE — 99214 OFFICE O/P EST MOD 30 MIN: CPT | Performed by: INTERNAL MEDICINE

## 2023-11-01 RX ORDER — LISINOPRIL AND HYDROCHLOROTHIAZIDE 20; 12.5 MG/1; MG/1
1 TABLET ORAL DAILY
Qty: 90 TABLET | Refills: 4 | Status: SHIPPED | OUTPATIENT
Start: 2023-11-01

## 2023-11-01 RX ORDER — INSULIN GLARGINE 100 [IU]/ML
18 INJECTION, SOLUTION SUBCUTANEOUS NIGHTLY
Qty: 5 ADJUSTABLE DOSE PRE-FILLED PEN SYRINGE | Refills: 3 | Status: SHIPPED | OUTPATIENT
Start: 2023-11-01

## 2023-11-01 NOTE — PROGRESS NOTES
Smita Malone MD                 Patient Information   Date:1/12/2023   Name : Dougie Card 78 y.o.       YOB: 1940                          History of Present Illness: Dougie Card is a 78 y.o. male here  for follow-up of  Type 2 Diabetes Mellitus . Self-referred for evaluation management of type 2 diabetes mellitus. He was managed by Dr. Crystal Olivo in the past  He is on Lantus 18 units  NovoLog  No hypoglycemia  Fasting blood glucose fluctuates depending on what he eats at night per patient   peripheral neuropathy        Prior history      Eating chocolate, ice cream at bedtime,   Diabetes was diagnosed in 2015, complains of tingling, pain in the feet. Evaluated by Dr. Elias Pean: Has peripheral neuropathy, sciatic radiculopathy, left leg weakness   Qualified for extra benefits based on  disability benefits      Eats 3 meals a day,              Physical Examination:      General: pleasant, no distress, good eye contact  HEENT: no exophthalmos, no periorbital edema, EOMI  Neck: No visible thyromegaly,    RS: Normal respiratory effort  Musculoskeletal: no tremors  Neurological: alert and oriented  Psychiatric: normal mood and affect  Skin: Normal color              Data Reviewed:             Lab Results   Component Value Date    GFRAA >60 06/12/2022        Lab Results   Component Value Date    LABA1C 7.5 (H) 06/06/2023    LABA1C 7.1 (H) 04/05/2022    LABA1C 7.9 (H) 11/29/2021         Lab Results   Component Value Date    LDLCALC 83 10/05/2022    TRIG 156 (H) 10/05/2022    HDL 36 (L) 10/05/2022     06/06/2023    K 4.5 06/06/2023     06/06/2023    CO2 28 06/06/2023    BUN 24 (H) 06/06/2023    CREATININE 1.57 (H) 06/06/2023    GFRAA >60 06/12/2022    GLUCOSE 273 (H) 06/06/2023    CALCIUM 9.4 06/06/2023    MALBCR 5 04/05/2022              Assessment/Plan:        ICD-10-CM    1.  Type 2 diabetes mellitus with

## 2023-11-01 NOTE — PROGRESS NOTES
Zonia Mckenna is a 78 y.o. male here for   Chief Complaint   Patient presents with    Diabetes       1. Have you been to the ER, urgent care clinic since your last visit? Hospitalized since your last visit? - no    2. Have you seen or consulted any other health care providers outside of the [address] [place] [address] since your last visit?   Include any pap smears or colon screening.- no

## 2024-01-31 ENCOUNTER — OFFICE VISIT (OUTPATIENT)
Dept: PRIMARY CARE CLINIC | Facility: CLINIC | Age: 84
End: 2024-01-31
Payer: MEDICARE

## 2024-01-31 VITALS
TEMPERATURE: 97.6 F | DIASTOLIC BLOOD PRESSURE: 76 MMHG | WEIGHT: 165.8 LBS | BODY MASS INDEX: 25.13 KG/M2 | HEIGHT: 68 IN | HEART RATE: 100 BPM | SYSTOLIC BLOOD PRESSURE: 132 MMHG

## 2024-01-31 DIAGNOSIS — G89.29 CHRONIC PAIN OF BOTH SHOULDERS: ICD-10-CM

## 2024-01-31 DIAGNOSIS — M25.511 CHRONIC PAIN OF BOTH SHOULDERS: ICD-10-CM

## 2024-01-31 DIAGNOSIS — E78.2 MIXED HYPERLIPIDEMIA: ICD-10-CM

## 2024-01-31 DIAGNOSIS — I10 ESSENTIAL HYPERTENSION: Primary | ICD-10-CM

## 2024-01-31 DIAGNOSIS — N18.31 STAGE 3A CHRONIC KIDNEY DISEASE (HCC): ICD-10-CM

## 2024-01-31 DIAGNOSIS — I73.9 PAD (PERIPHERAL ARTERY DISEASE) (HCC): ICD-10-CM

## 2024-01-31 DIAGNOSIS — M25.512 CHRONIC PAIN OF BOTH SHOULDERS: ICD-10-CM

## 2024-01-31 DIAGNOSIS — R39.12 BENIGN PROSTATIC HYPERPLASIA WITH WEAK URINARY STREAM: ICD-10-CM

## 2024-01-31 DIAGNOSIS — E11.42 TYPE 2 DIABETES MELLITUS WITH DIABETIC POLYNEUROPATHY, WITHOUT LONG-TERM CURRENT USE OF INSULIN (HCC): ICD-10-CM

## 2024-01-31 DIAGNOSIS — N40.1 BENIGN PROSTATIC HYPERPLASIA WITH WEAK URINARY STREAM: ICD-10-CM

## 2024-01-31 PROBLEM — E11.65 TYPE 2 DIABETES MELLITUS WITH HYPERGLYCEMIA, WITHOUT LONG-TERM CURRENT USE OF INSULIN (HCC): Status: RESOLVED | Noted: 2019-03-03 | Resolved: 2024-01-31

## 2024-01-31 PROCEDURE — G8427 DOCREV CUR MEDS BY ELIG CLIN: HCPCS | Performed by: NURSE PRACTITIONER

## 2024-01-31 PROCEDURE — G8419 CALC BMI OUT NRM PARAM NOF/U: HCPCS | Performed by: NURSE PRACTITIONER

## 2024-01-31 PROCEDURE — 3075F SYST BP GE 130 - 139MM HG: CPT | Performed by: NURSE PRACTITIONER

## 2024-01-31 PROCEDURE — 1123F ACP DISCUSS/DSCN MKR DOCD: CPT | Performed by: NURSE PRACTITIONER

## 2024-01-31 PROCEDURE — 1036F TOBACCO NON-USER: CPT | Performed by: NURSE PRACTITIONER

## 2024-01-31 PROCEDURE — 99214 OFFICE O/P EST MOD 30 MIN: CPT | Performed by: NURSE PRACTITIONER

## 2024-01-31 PROCEDURE — 3078F DIAST BP <80 MM HG: CPT | Performed by: NURSE PRACTITIONER

## 2024-01-31 PROCEDURE — G8484 FLU IMMUNIZE NO ADMIN: HCPCS | Performed by: NURSE PRACTITIONER

## 2024-01-31 RX ORDER — LISINOPRIL AND HYDROCHLOROTHIAZIDE 20; 12.5 MG/1; MG/1
1 TABLET ORAL DAILY
Qty: 90 TABLET | Refills: 4 | Status: SHIPPED | OUTPATIENT
Start: 2024-01-31

## 2024-01-31 RX ORDER — TAMSULOSIN HYDROCHLORIDE 0.4 MG/1
0.4 CAPSULE ORAL DAILY
Qty: 90 CAPSULE | Refills: 1 | Status: SHIPPED | OUTPATIENT
Start: 2024-01-31

## 2024-01-31 RX ORDER — EZETIMIBE 10 MG/1
10 TABLET ORAL DAILY
Qty: 90 TABLET | Refills: 1 | Status: SHIPPED | OUTPATIENT
Start: 2024-01-31

## 2024-01-31 SDOH — ECONOMIC STABILITY: INCOME INSECURITY: HOW HARD IS IT FOR YOU TO PAY FOR THE VERY BASICS LIKE FOOD, HOUSING, MEDICAL CARE, AND HEATING?: NOT HARD AT ALL

## 2024-01-31 SDOH — ECONOMIC STABILITY: FOOD INSECURITY: WITHIN THE PAST 12 MONTHS, THE FOOD YOU BOUGHT JUST DIDN'T LAST AND YOU DIDN'T HAVE MONEY TO GET MORE.: NEVER TRUE

## 2024-01-31 SDOH — ECONOMIC STABILITY: FOOD INSECURITY: WITHIN THE PAST 12 MONTHS, YOU WORRIED THAT YOUR FOOD WOULD RUN OUT BEFORE YOU GOT MONEY TO BUY MORE.: NEVER TRUE

## 2024-01-31 ASSESSMENT — PATIENT HEALTH QUESTIONNAIRE - PHQ9
SUM OF ALL RESPONSES TO PHQ QUESTIONS 1-9: 0
1. LITTLE INTEREST OR PLEASURE IN DOING THINGS: 0
SUM OF ALL RESPONSES TO PHQ QUESTIONS 1-9: 0
2. FEELING DOWN, DEPRESSED OR HOPELESS: 0
SUM OF ALL RESPONSES TO PHQ9 QUESTIONS 1 & 2: 0
SUM OF ALL RESPONSES TO PHQ QUESTIONS 1-9: 0
SUM OF ALL RESPONSES TO PHQ QUESTIONS 1-9: 0
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
## 2024-02-27 ENCOUNTER — OFFICE VISIT (OUTPATIENT)
Dept: PRIMARY CARE CLINIC | Facility: CLINIC | Age: 84
End: 2024-02-27
Payer: MEDICARE

## 2024-02-27 VITALS
BODY MASS INDEX: 27.82 KG/M2 | OXYGEN SATURATION: 99 % | RESPIRATION RATE: 16 BRPM | SYSTOLIC BLOOD PRESSURE: 118 MMHG | DIASTOLIC BLOOD PRESSURE: 74 MMHG | HEART RATE: 89 BPM | TEMPERATURE: 97.6 F | WEIGHT: 167 LBS | HEIGHT: 65 IN

## 2024-02-27 DIAGNOSIS — M19.90 ARTHRITIS: Primary | ICD-10-CM

## 2024-02-27 PROCEDURE — G8427 DOCREV CUR MEDS BY ELIG CLIN: HCPCS | Performed by: NURSE PRACTITIONER

## 2024-02-27 PROCEDURE — G8484 FLU IMMUNIZE NO ADMIN: HCPCS | Performed by: NURSE PRACTITIONER

## 2024-02-27 PROCEDURE — 1036F TOBACCO NON-USER: CPT | Performed by: NURSE PRACTITIONER

## 2024-02-27 PROCEDURE — 99213 OFFICE O/P EST LOW 20 MIN: CPT | Performed by: NURSE PRACTITIONER

## 2024-02-27 PROCEDURE — 3078F DIAST BP <80 MM HG: CPT | Performed by: NURSE PRACTITIONER

## 2024-02-27 PROCEDURE — G8419 CALC BMI OUT NRM PARAM NOF/U: HCPCS | Performed by: NURSE PRACTITIONER

## 2024-02-27 PROCEDURE — 3074F SYST BP LT 130 MM HG: CPT | Performed by: NURSE PRACTITIONER

## 2024-02-27 PROCEDURE — 1123F ACP DISCUSS/DSCN MKR DOCD: CPT | Performed by: NURSE PRACTITIONER

## 2024-02-27 RX ORDER — MELOXICAM 7.5 MG/1
7.5 TABLET ORAL DAILY
Qty: 90 TABLET | Refills: 1 | Status: SHIPPED | OUTPATIENT
Start: 2024-02-27

## 2024-02-27 SDOH — ECONOMIC STABILITY: FOOD INSECURITY: WITHIN THE PAST 12 MONTHS, THE FOOD YOU BOUGHT JUST DIDN'T LAST AND YOU DIDN'T HAVE MONEY TO GET MORE.: NEVER TRUE

## 2024-02-27 SDOH — ECONOMIC STABILITY: INCOME INSECURITY: HOW HARD IS IT FOR YOU TO PAY FOR THE VERY BASICS LIKE FOOD, HOUSING, MEDICAL CARE, AND HEATING?: NOT HARD AT ALL

## 2024-02-27 SDOH — ECONOMIC STABILITY: FOOD INSECURITY: WITHIN THE PAST 12 MONTHS, YOU WORRIED THAT YOUR FOOD WOULD RUN OUT BEFORE YOU GOT MONEY TO BUY MORE.: NEVER TRUE

## 2024-02-27 ASSESSMENT — PATIENT HEALTH QUESTIONNAIRE - PHQ9
1. LITTLE INTEREST OR PLEASURE IN DOING THINGS: 0
SUM OF ALL RESPONSES TO PHQ QUESTIONS 1-9: 0
2. FEELING DOWN, DEPRESSED OR HOPELESS: 0
SUM OF ALL RESPONSES TO PHQ QUESTIONS 1-9: 0
SUM OF ALL RESPONSES TO PHQ9 QUESTIONS 1 & 2: 0

## 2024-02-27 NOTE — PROGRESS NOTES
Christopher Rodriguez is a 83 y.o. male presents for    Chief Complaint   Patient presents with    Other     Arthritis on both hands and shoulders as well. Has been using bee venom cream and total tumeric to help but it doesn't stop it.     .    ASSESSMENT and PLAN  Christopher was seen today for other.    Diagnoses and all orders for this visit:    Arthritis  Comments:  Pt would like to try meloxicam and to see ortho. He has an appointment scheduled for next week to see them  Orders:  -     meloxicam (MOBIC) 7.5 MG tablet; Take 1 tablet by mouth daily             HISTORY OF PRESENT ILLNESS  Christopher Rodriguez is a 83 y.o. male presents for    Chief Complaint   Patient presents with    Other     Arthritis on both hands and shoulders as well. Has been using bee venom cream and total tumeric to help but it doesn't stop it.     .    Patient reports arthritis to both hands for about a year but feels it is getting worse.  He has pain when balling his fists up or putting pressure on his hands. He has not tried anything OTC. He feels joints swell at times. They bother him daily.     Vitals:    02/27/24 1054   BP: 118/74   Site: Right Upper Arm   Position: Sitting   Cuff Size: Medium Adult   Pulse: 89   Resp: 16   Temp: 97.6 °F (36.4 °C)   TempSrc: Oral   SpO2: 99%   Weight: 75.8 kg (167 lb)   Height: 1.651 m (5' 5\")     Patient Active Problem List   Diagnosis    Mixed hyperlipidemia    Vitamin D deficiency    Chronic kidney disease, stage 3 ([facility])    Polyneuropathy, unspecified    Coronary arteriosclerosis    Osteoarthritis of left knee    Essential hypertension    S/P total knee replacement, left    PAD (peripheral artery disease) ([facility])    Neurocognitive deficits    Benign prostatic hyperplasia with weak urinary stream    Type 2 diabetes mellitus with diabetic polyneuropathy, without long-term current use of insulin ([facility])     Patient Active Problem List    Diagnosis Date Noted    Type 2 diabetes mellitus with diabetic polyneuropathy,

## 2024-02-27 NOTE — PROGRESS NOTES
\"Have you been to the ER, urgent care clinic since your last visit?  Hospitalized since your last visit?\"    NO    “Have you seen or consulted any other health care providers outside of [facility] since your last visit?”    NO

## 2024-03-05 ENCOUNTER — TELEPHONE (OUTPATIENT)
Age: 84
End: 2024-03-05

## 2024-03-05 DIAGNOSIS — M25.511 RIGHT SHOULDER PAIN, UNSPECIFIED CHRONICITY: ICD-10-CM

## 2024-03-05 DIAGNOSIS — M25.512 LEFT SHOULDER PAIN, UNSPECIFIED CHRONICITY: Primary | ICD-10-CM

## 2024-03-05 NOTE — TELEPHONE ENCOUNTER
Spoke with patient who stated that he need to cancel his appt he is taking medication and he feels better that the appt  can be canceled

## 2024-05-06 ENCOUNTER — OFFICE VISIT (OUTPATIENT)
Age: 84
End: 2024-05-06
Payer: MEDICARE

## 2024-05-06 VITALS
TEMPERATURE: 98.9 F | BODY MASS INDEX: 28.32 KG/M2 | RESPIRATION RATE: 20 BRPM | DIASTOLIC BLOOD PRESSURE: 69 MMHG | HEART RATE: 91 BPM | HEIGHT: 65 IN | WEIGHT: 170 LBS | OXYGEN SATURATION: 94 % | SYSTOLIC BLOOD PRESSURE: 127 MMHG

## 2024-05-06 DIAGNOSIS — E78.2 MIXED HYPERLIPIDEMIA: ICD-10-CM

## 2024-05-06 DIAGNOSIS — I10 ESSENTIAL HYPERTENSION: ICD-10-CM

## 2024-05-06 DIAGNOSIS — E11.42 TYPE 2 DIABETES MELLITUS WITH DIABETIC POLYNEUROPATHY, WITHOUT LONG-TERM CURRENT USE OF INSULIN (HCC): Primary | ICD-10-CM

## 2024-05-06 LAB — HBA1C MFR BLD: 6.9 %

## 2024-05-06 PROCEDURE — 1123F ACP DISCUSS/DSCN MKR DOCD: CPT | Performed by: INTERNAL MEDICINE

## 2024-05-06 PROCEDURE — G8427 DOCREV CUR MEDS BY ELIG CLIN: HCPCS | Performed by: INTERNAL MEDICINE

## 2024-05-06 PROCEDURE — G8419 CALC BMI OUT NRM PARAM NOF/U: HCPCS | Performed by: INTERNAL MEDICINE

## 2024-05-06 PROCEDURE — G2211 COMPLEX E/M VISIT ADD ON: HCPCS | Performed by: INTERNAL MEDICINE

## 2024-05-06 PROCEDURE — 3078F DIAST BP <80 MM HG: CPT | Performed by: INTERNAL MEDICINE

## 2024-05-06 PROCEDURE — 3074F SYST BP LT 130 MM HG: CPT | Performed by: INTERNAL MEDICINE

## 2024-05-06 PROCEDURE — 83036 HEMOGLOBIN GLYCOSYLATED A1C: CPT | Performed by: INTERNAL MEDICINE

## 2024-05-06 PROCEDURE — 1036F TOBACCO NON-USER: CPT | Performed by: INTERNAL MEDICINE

## 2024-05-06 PROCEDURE — 99214 OFFICE O/P EST MOD 30 MIN: CPT | Performed by: INTERNAL MEDICINE

## 2024-05-06 RX ORDER — BLOOD-GLUCOSE METER
KIT MISCELLANEOUS
Qty: 300 EACH | Refills: 3 | Status: SHIPPED | OUTPATIENT
Start: 2024-05-06 | End: 2024-05-09

## 2024-05-06 RX ORDER — LANCETS 28 GAUGE
EACH MISCELLANEOUS
Qty: 300 EACH | Refills: 3 | Status: SHIPPED | OUTPATIENT
Start: 2024-05-06 | End: 2024-05-09

## 2024-05-06 NOTE — PROGRESS NOTES
[facility] DIABETES AND ENDOCRINOLOGY                 Mirella Gavin MD                 Patient Information   Date:1/12/2023   Name : Christopher Rodriguez 82 y.o.       YOB: 1940                          History of Present Illness: Christopher Rodriguez is here  for follow-up of  Type 2 Diabetes Mellitus .       Self-referred for evaluation management of type 2 diabetes mellitus.   He was managed by Dr. Tirado in the past  He is on Lantus 14 units  NovoLog for correction  No hypoglycemia  Fasting blood glucose fluctuates depending on what he eats at night per patient   peripheral neuropathy: Gabapentin did not help, B12 did not help        Prior history      Eating chocolate, ice cream at bedtime,   Diabetes was diagnosed in 2015, complains of tingling, pain in the feet.   Evaluated by Dr. ROLLINS: Has peripheral neuropathy, sciatic radiculopathy, left leg weakness   Qualified for extra benefits based on  disability benefits      Eats 3 meals a day,              Physical Examination:      General: pleasant, no distress, good eye contact  HEENT: no exophthalmos, no periorbital edema, EOMI  Neck: No visible thyromegaly,    RS: Normal respiratory effort  Musculoskeletal: no tremors  Neurological: alert and oriented  Psychiatric: normal mood and affect  Skin: Normal color              Data Reviewed:             Lab Results   Component Value Date    GFRAA >60 06/12/2022        Lab Results   Component Value Date    LABA1C 7.5 (H) 06/06/2023    LABA1C 7.1 (H) 04/05/2022    LABA1C 7.9 (H) 11/29/2021         Lab Results   Component Value Date    TRIG 156 (H) 10/05/2022    HDL 36 (L) 10/05/2022     06/06/2023    K 4.5 06/06/2023     06/06/2023    CO2 28 06/06/2023    BUN 24 (H) 06/06/2023    CREATININE 1.57 (H) 06/06/2023    GFRAA >60 06/12/2022    GLUCOSE 273 (H) 06/06/2023    CALCIUM 9.4 06/06/2023    MALBCR 5 04/05/2022              Assessment/Plan:        ICD-10-CM    1. Type 2 diabetes

## 2024-05-07 ENCOUNTER — TELEPHONE (OUTPATIENT)
Dept: PRIMARY CARE CLINIC | Facility: CLINIC | Age: 84
End: 2024-05-07

## 2024-05-07 DIAGNOSIS — N40.1 BENIGN PROSTATIC HYPERPLASIA WITH WEAK URINARY STREAM: ICD-10-CM

## 2024-05-07 DIAGNOSIS — R39.12 BENIGN PROSTATIC HYPERPLASIA WITH WEAK URINARY STREAM: ICD-10-CM

## 2024-05-07 RX ORDER — TAMSULOSIN HYDROCHLORIDE 0.4 MG/1
0.4 CAPSULE ORAL DAILY
Qty: 90 CAPSULE | Refills: 1 | Status: SHIPPED | OUTPATIENT
Start: 2024-05-07

## 2024-05-07 NOTE — TELEPHONE ENCOUNTER
Christopher Rodriguez is requesting a refill on tamsulosin (FLOMAX) 0.4 MG capsule  .   Please send medication to: [id number] [place], [place] - [address] - P [phone] - F [phone] [address] [address]  Phone: [phone]  Fax: [phone]   Patient's last appointment was 2/27/2024   Next visit is scheduled for 6/11/2024

## 2024-05-09 DIAGNOSIS — N40.1 BENIGN PROSTATIC HYPERPLASIA WITH WEAK URINARY STREAM: ICD-10-CM

## 2024-05-09 DIAGNOSIS — R39.12 BENIGN PROSTATIC HYPERPLASIA WITH WEAK URINARY STREAM: ICD-10-CM

## 2024-05-09 DIAGNOSIS — E11.42 TYPE 2 DIABETES MELLITUS WITH DIABETIC POLYNEUROPATHY, WITHOUT LONG-TERM CURRENT USE OF INSULIN (HCC): ICD-10-CM

## 2024-05-09 RX ORDER — TAMSULOSIN HYDROCHLORIDE 0.4 MG/1
CAPSULE ORAL
Refills: 0 | OUTPATIENT
Start: 2024-05-09

## 2024-05-09 RX ORDER — LANCETS 28 GAUGE
EACH MISCELLANEOUS
Qty: 300 EACH | Refills: 3 | Status: SHIPPED | OUTPATIENT
Start: 2024-05-09

## 2024-05-09 RX ORDER — BLOOD-GLUCOSE METER
KIT MISCELLANEOUS
Qty: 300 STRIP | Refills: 3 | Status: SHIPPED | OUTPATIENT
Start: 2024-05-09

## 2024-05-09 RX ORDER — MELOXICAM 7.5 MG/1
TABLET ORAL
Refills: 0 | OUTPATIENT
Start: 2024-05-09

## 2024-06-26 ENCOUNTER — TELEPHONE (OUTPATIENT)
Dept: PRIMARY CARE CLINIC | Facility: CLINIC | Age: 84
End: 2024-06-26

## 2024-08-22 DIAGNOSIS — N40.1 BENIGN PROSTATIC HYPERPLASIA WITH WEAK URINARY STREAM: ICD-10-CM

## 2024-08-22 DIAGNOSIS — R39.12 BENIGN PROSTATIC HYPERPLASIA WITH WEAK URINARY STREAM: ICD-10-CM

## 2024-08-27 RX ORDER — TAMSULOSIN HYDROCHLORIDE 0.4 MG/1
0.4 CAPSULE ORAL DAILY
Qty: 90 CAPSULE | Refills: 1 | Status: SHIPPED | OUTPATIENT
Start: 2024-08-27

## 2024-08-27 RX ORDER — MELOXICAM 7.5 MG/1
7.5 TABLET ORAL DAILY
Qty: 90 TABLET | Refills: 1 | Status: SHIPPED | OUTPATIENT
Start: 2024-08-27

## 2024-09-06 ENCOUNTER — LAB (OUTPATIENT)
Age: 84
End: 2024-09-06

## 2024-09-06 DIAGNOSIS — E78.2 MIXED HYPERLIPIDEMIA: ICD-10-CM

## 2024-09-06 DIAGNOSIS — I10 ESSENTIAL HYPERTENSION: ICD-10-CM

## 2024-09-06 DIAGNOSIS — E11.42 TYPE 2 DIABETES MELLITUS WITH DIABETIC POLYNEUROPATHY, WITHOUT LONG-TERM CURRENT USE OF INSULIN (HCC): ICD-10-CM

## 2024-09-07 LAB
ANION GAP SERPL CALC-SCNC: 3 MMOL/L (ref 2–12)
BUN SERPL-MCNC: 20 MG/DL (ref 6–20)
BUN/CREAT SERPL: 12 (ref 12–20)
CALCIUM SERPL-MCNC: 8.9 MG/DL (ref 8.5–10.1)
CHLORIDE SERPL-SCNC: 111 MMOL/L (ref 97–108)
CO2 SERPL-SCNC: 25 MMOL/L (ref 21–32)
CREAT SERPL-MCNC: 1.68 MG/DL (ref 0.7–1.3)
CREAT UR-MCNC: 187 MG/DL
EST. AVERAGE GLUCOSE BLD GHB EST-MCNC: 126 MG/DL
GLUCOSE SERPL-MCNC: 176 MG/DL (ref 65–100)
HBA1C MFR BLD: 6 % (ref 4–5.6)
LDLC SERPL DIRECT ASSAY-MCNC: 101 MG/DL (ref 0–100)
MICROALBUMIN UR-MCNC: 0.76 MG/DL
MICROALBUMIN/CREAT UR-RTO: 4 MG/G (ref 0–30)
POTASSIUM SERPL-SCNC: 4.2 MMOL/L (ref 3.5–5.1)
SODIUM SERPL-SCNC: 139 MMOL/L (ref 136–145)

## 2024-11-25 DIAGNOSIS — R39.12 BENIGN PROSTATIC HYPERPLASIA WITH WEAK URINARY STREAM: ICD-10-CM

## 2024-11-25 DIAGNOSIS — N40.1 BENIGN PROSTATIC HYPERPLASIA WITH WEAK URINARY STREAM: ICD-10-CM

## 2024-11-25 RX ORDER — TAMSULOSIN HYDROCHLORIDE 0.4 MG/1
0.4 CAPSULE ORAL DAILY
Qty: 90 CAPSULE | Refills: 0 | Status: SHIPPED | OUTPATIENT
Start: 2024-11-25

## 2024-11-27 DIAGNOSIS — I10 ESSENTIAL HYPERTENSION: ICD-10-CM

## 2024-11-27 DIAGNOSIS — E11.42 TYPE 2 DIABETES MELLITUS WITH DIABETIC POLYNEUROPATHY, WITHOUT LONG-TERM CURRENT USE OF INSULIN (HCC): ICD-10-CM

## 2024-11-27 RX ORDER — LISINOPRIL AND HYDROCHLOROTHIAZIDE 12.5; 2 MG/1; MG/1
1 TABLET ORAL DAILY
Qty: 90 TABLET | Refills: 3 | Status: SHIPPED | OUTPATIENT
Start: 2024-11-27

## 2024-12-18 ENCOUNTER — OFFICE VISIT (OUTPATIENT)
Age: 84
End: 2024-12-18
Payer: MEDICARE

## 2024-12-18 VITALS
WEIGHT: 168 LBS | RESPIRATION RATE: 20 BRPM | HEIGHT: 68 IN | DIASTOLIC BLOOD PRESSURE: 73 MMHG | BODY MASS INDEX: 25.46 KG/M2 | TEMPERATURE: 98.2 F | HEART RATE: 103 BPM | SYSTOLIC BLOOD PRESSURE: 136 MMHG | OXYGEN SATURATION: 97 %

## 2024-12-18 DIAGNOSIS — E78.2 MIXED HYPERLIPIDEMIA: ICD-10-CM

## 2024-12-18 DIAGNOSIS — N18.31 STAGE 3A CHRONIC KIDNEY DISEASE (HCC): ICD-10-CM

## 2024-12-18 DIAGNOSIS — E11.65 TYPE 2 DIABETES MELLITUS WITH HYPERGLYCEMIA, WITHOUT LONG-TERM CURRENT USE OF INSULIN (HCC): ICD-10-CM

## 2024-12-18 DIAGNOSIS — I10 ESSENTIAL HYPERTENSION: ICD-10-CM

## 2024-12-18 DIAGNOSIS — E11.42 TYPE 2 DIABETES MELLITUS WITH DIABETIC POLYNEUROPATHY, WITHOUT LONG-TERM CURRENT USE OF INSULIN (HCC): Primary | ICD-10-CM

## 2024-12-18 LAB — HBA1C MFR BLD: 6.3 %

## 2024-12-18 PROCEDURE — G2211 COMPLEX E/M VISIT ADD ON: HCPCS | Performed by: INTERNAL MEDICINE

## 2024-12-18 PROCEDURE — PBSHW AMB POC HEMOGLOBIN A1C: Performed by: INTERNAL MEDICINE

## 2024-12-18 PROCEDURE — 1160F RVW MEDS BY RX/DR IN RCRD: CPT | Performed by: INTERNAL MEDICINE

## 2024-12-18 PROCEDURE — G8484 FLU IMMUNIZE NO ADMIN: HCPCS | Performed by: INTERNAL MEDICINE

## 2024-12-18 PROCEDURE — 1123F ACP DISCUSS/DSCN MKR DOCD: CPT | Performed by: INTERNAL MEDICINE

## 2024-12-18 PROCEDURE — 1126F AMNT PAIN NOTED NONE PRSNT: CPT | Performed by: INTERNAL MEDICINE

## 2024-12-18 PROCEDURE — 83036 HEMOGLOBIN GLYCOSYLATED A1C: CPT | Performed by: INTERNAL MEDICINE

## 2024-12-18 PROCEDURE — 3044F HG A1C LEVEL LT 7.0%: CPT | Performed by: INTERNAL MEDICINE

## 2024-12-18 PROCEDURE — 99214 OFFICE O/P EST MOD 30 MIN: CPT | Performed by: INTERNAL MEDICINE

## 2024-12-18 PROCEDURE — G8427 DOCREV CUR MEDS BY ELIG CLIN: HCPCS | Performed by: INTERNAL MEDICINE

## 2024-12-18 PROCEDURE — G8419 CALC BMI OUT NRM PARAM NOF/U: HCPCS | Performed by: INTERNAL MEDICINE

## 2024-12-18 PROCEDURE — 1036F TOBACCO NON-USER: CPT | Performed by: INTERNAL MEDICINE

## 2024-12-18 PROCEDURE — 1159F MED LIST DOCD IN RCRD: CPT | Performed by: INTERNAL MEDICINE

## 2024-12-18 RX ORDER — INSULIN GLARGINE 100 [IU]/ML
12 INJECTION, SOLUTION SUBCUTANEOUS NIGHTLY
Qty: 5 ADJUSTABLE DOSE PRE-FILLED PEN SYRINGE | Refills: 3 | Status: SHIPPED | OUTPATIENT
Start: 2024-12-18

## 2024-12-18 NOTE — PROGRESS NOTES
Christopher Rodriguez is a 84 y.o. male here for   Chief Complaint   Patient presents with    Diabetes       1. Have you been to the ER, urgent care clinic since your last visit?  Hospitalized since your last visit? -no    2. Have you seen or consulted any other health care providers outside of the [facility] since your last visit?  Include any pap smears or colon screening.-no    
(H) 10/05/2022    HDL 36 (L) 10/05/2022     09/06/2024    K 4.2 09/06/2024     (H) 09/06/2024    CO2 25 09/06/2024    BUN 20 09/06/2024    CREATININE 1.68 (H) 09/06/2024    GFRAA >60 06/12/2022    GLUCOSE 176 (H) 09/06/2024    CALCIUM 8.9 09/06/2024              Assessment/Plan:        ICD-10-CM    1. Type 2 diabetes mellitus with diabetic polyneuropathy, without long-term current use of insulin ([facility])  E11.42 AMB POC HEMOGLOBIN A1C      2. Essential hypertension  I10       3. Mixed hyperlipidemia  E78.2       4. Stage 3a chronic kidney disease ([facility])  N18.31                 1. Type 2 Diabetes Mellitus      Lab Results   Component Value Date    LABA1C 6.0 (H) 09/06/2024     Hemoglobin A1C, POC   Date Value Ref Range Status   05/06/2024 6.9 % Final     Lantus 12 units, was on 24 units in the past   Annual eye exam, foot care   On high carb diet  Self discontinued metformin   Adjusted insulin    2.  HTN :  Continue current therapy       3. Hyperlipidemia :  zetia       4.  CAD   No calf claudication      5.  Peripheral neuropathy: Followed by neurology  Vitamin B12  Meds tried in the past, gabapentin      A1c is 6.3, indicating effective management. Morning blood glucose levels are 80-90. Current regimen is 14 units of Lantus insulin, to be reduced to 12 units.  - Advised to monitor blood glucose closely and reduce insulin by 2 units if low blood sugar observed  - Instructed to rotate injection sites  - Continue vitamin B12 supplementation  - Comprehensive blood test, including cholesterol and kidney function tests, to be conducted next visit        There are no Patient Instructions on file for this visit.            Thank you for allowing me to participate in the care of this patient.      Mirella Gvain MD         Patient verbalized understanding       Voice-recognition software was used to generate this report, which may result in some phonetic-based errors in the grammar and contents.  Even though

## 2025-01-17 DIAGNOSIS — E11.65 TYPE 2 DIABETES MELLITUS WITH HYPERGLYCEMIA, WITHOUT LONG-TERM CURRENT USE OF INSULIN (HCC): ICD-10-CM

## 2025-01-17 RX ORDER — INSULIN GLARGINE 100 [IU]/ML
12 INJECTION, SOLUTION SUBCUTANEOUS NIGHTLY
Qty: 15 ML | Refills: 3 | Status: SHIPPED | OUTPATIENT
Start: 2025-01-17

## 2025-01-27 RX ORDER — MELOXICAM 7.5 MG/1
7.5 TABLET ORAL DAILY
Qty: 90 TABLET | Refills: 3 | OUTPATIENT
Start: 2025-01-27

## 2025-03-17 DIAGNOSIS — R39.12 BENIGN PROSTATIC HYPERPLASIA WITH WEAK URINARY STREAM: ICD-10-CM

## 2025-03-17 DIAGNOSIS — N40.1 BENIGN PROSTATIC HYPERPLASIA WITH WEAK URINARY STREAM: ICD-10-CM

## 2025-03-17 RX ORDER — TAMSULOSIN HYDROCHLORIDE 0.4 MG/1
0.4 CAPSULE ORAL DAILY
Qty: 90 CAPSULE | Refills: 0 | Status: SHIPPED | OUTPATIENT
Start: 2025-03-17

## 2025-03-17 NOTE — TELEPHONE ENCOUNTER
Problem: Safety  Goal: Will remain free from falls  Intervention: Implement fall precautions  Patient at risk for falls related to weakness. Given education on mobilizing safely and calling as needed. Patient verbalized and demonstrated understanding, will continue to provide assistance as needed       Problem: Mobility  Goal: Risk for activity intolerance will decrease  Intervention: Encourage patient to increase activity level in collaboration with Interdisciplinary Team  Patient ambulated with minimal standby assistance with FWW and O2. Patient encouraged to increase mobility as tolerated while in the ICU           Requested Prescriptions     Pending Prescriptions Disp Refills    tamsulosin (FLOMAX) 0.4 MG capsule [Pharmacy Med Name: TAMSULOSIN HCL CAPS 0.4MG] 90 capsule 3     Sig: TAKE 1 CAPSULE DAILY

## 2025-03-18 SDOH — ECONOMIC STABILITY: INCOME INSECURITY: IN THE LAST 12 MONTHS, WAS THERE A TIME WHEN YOU WERE NOT ABLE TO PAY THE MORTGAGE OR RENT ON TIME?: PATIENT DECLINED

## 2025-03-18 SDOH — ECONOMIC STABILITY: FOOD INSECURITY: WITHIN THE PAST 12 MONTHS, YOU WORRIED THAT YOUR FOOD WOULD RUN OUT BEFORE YOU GOT MONEY TO BUY MORE.: PATIENT DECLINED

## 2025-03-18 SDOH — ECONOMIC STABILITY: TRANSPORTATION INSECURITY
IN THE PAST 12 MONTHS, HAS LACK OF TRANSPORTATION KEPT YOU FROM MEETINGS, WORK, OR FROM GETTING THINGS NEEDED FOR DAILY LIVING?: PATIENT DECLINED

## 2025-03-18 SDOH — ECONOMIC STABILITY: TRANSPORTATION INSECURITY
IN THE PAST 12 MONTHS, HAS THE LACK OF TRANSPORTATION KEPT YOU FROM MEDICAL APPOINTMENTS OR FROM GETTING MEDICATIONS?: PATIENT DECLINED

## 2025-03-18 SDOH — ECONOMIC STABILITY: FOOD INSECURITY: WITHIN THE PAST 12 MONTHS, THE FOOD YOU BOUGHT JUST DIDN'T LAST AND YOU DIDN'T HAVE MONEY TO GET MORE.: PATIENT DECLINED

## 2025-03-19 ENCOUNTER — OFFICE VISIT (OUTPATIENT)
Dept: PRIMARY CARE CLINIC | Facility: CLINIC | Age: 85
End: 2025-03-19
Payer: MEDICARE

## 2025-03-19 VITALS
DIASTOLIC BLOOD PRESSURE: 72 MMHG | HEART RATE: 115 BPM | OXYGEN SATURATION: 97 % | TEMPERATURE: 97.8 F | BODY MASS INDEX: 24.52 KG/M2 | HEIGHT: 68 IN | SYSTOLIC BLOOD PRESSURE: 113 MMHG | WEIGHT: 161.8 LBS | RESPIRATION RATE: 16 BRPM

## 2025-03-19 DIAGNOSIS — R53.1 WEAKNESS: ICD-10-CM

## 2025-03-19 DIAGNOSIS — N18.31 STAGE 3A CHRONIC KIDNEY DISEASE (HCC): ICD-10-CM

## 2025-03-19 DIAGNOSIS — Z00.00 MEDICARE ANNUAL WELLNESS VISIT, SUBSEQUENT: Primary | ICD-10-CM

## 2025-03-19 DIAGNOSIS — I73.9 PAD (PERIPHERAL ARTERY DISEASE): ICD-10-CM

## 2025-03-19 DIAGNOSIS — E11.42 TYPE 2 DIABETES MELLITUS WITH DIABETIC POLYNEUROPATHY, WITHOUT LONG-TERM CURRENT USE OF INSULIN (HCC): ICD-10-CM

## 2025-03-19 DIAGNOSIS — I10 ESSENTIAL HYPERTENSION: ICD-10-CM

## 2025-03-19 DIAGNOSIS — G62.9 NEUROPATHY: ICD-10-CM

## 2025-03-19 PROCEDURE — G8420 CALC BMI NORM PARAMETERS: HCPCS | Performed by: NURSE PRACTITIONER

## 2025-03-19 PROCEDURE — G0439 PPPS, SUBSEQ VISIT: HCPCS | Performed by: NURSE PRACTITIONER

## 2025-03-19 PROCEDURE — G8427 DOCREV CUR MEDS BY ELIG CLIN: HCPCS | Performed by: NURSE PRACTITIONER

## 2025-03-19 PROCEDURE — 1123F ACP DISCUSS/DSCN MKR DOCD: CPT | Performed by: NURSE PRACTITIONER

## 2025-03-19 PROCEDURE — 99214 OFFICE O/P EST MOD 30 MIN: CPT | Performed by: NURSE PRACTITIONER

## 2025-03-19 PROCEDURE — 1159F MED LIST DOCD IN RCRD: CPT | Performed by: NURSE PRACTITIONER

## 2025-03-19 PROCEDURE — 1160F RVW MEDS BY RX/DR IN RCRD: CPT | Performed by: NURSE PRACTITIONER

## 2025-03-19 PROCEDURE — 1036F TOBACCO NON-USER: CPT | Performed by: NURSE PRACTITIONER

## 2025-03-19 ASSESSMENT — PATIENT HEALTH QUESTIONNAIRE - PHQ9
SUM OF ALL RESPONSES TO PHQ QUESTIONS 1-9: 0
1. LITTLE INTEREST OR PLEASURE IN DOING THINGS: NOT AT ALL
SUM OF ALL RESPONSES TO PHQ QUESTIONS 1-9: 0
2. FEELING DOWN, DEPRESSED OR HOPELESS: NOT AT ALL

## 2025-03-19 ASSESSMENT — LIFESTYLE VARIABLES
HOW OFTEN DO YOU HAVE A DRINK CONTAINING ALCOHOL: MONTHLY OR LESS
HOW MANY STANDARD DRINKS CONTAINING ALCOHOL DO YOU HAVE ON A TYPICAL DAY: PATIENT DOES NOT DRINK

## 2025-03-19 NOTE — PROGRESS NOTES
Medicare Annual Wellness Visit    Christopher Rodriguez is here for Medicare AWV (No acute issues, still dealing with neuropathy)    Assessment & Plan   Medicare annual wellness visit, subsequent  -     Ambulatory Referral to [facility]  Essential hypertension  Comments:  BP is well controlled.  Type 2 diabetes mellitus with diabetic polyneuropathy, without long-term current use of insulin (HCC)  Comments:  following with Endo  Assessment & Plan:   Chronic, at goal (stable), continue current treatment plan  Stage 3a chronic kidney disease (HCC)  Assessment & Plan:   Chronic, at goal (stable), continue current treatment plan  PAD (peripheral artery disease)  Neuropathy  Comments:  Discussed gabapentin, he declined. Will try PT  Orders:  -     External Referral To Physical Therapy  Weakness  -     External Referral To Physical Therapy     Return in about 6 months (around 9/19/2025) for COV.     Subjective   The following acute and/or chronic problems were also addressed today:  Type 2 DM: patient reports his A1c is well controlled. He is seeing Endocrinology. He reports he does have neuropathy. Reports he has had it for a few years. Wondering what treatment there is for it.  Hemoglobin A1C   Date Value Ref Range Status   09/06/2024 6.0 (H) 4.0 - 5.6 % Final     Comment:     (NOTE)  HbA1C Interpretive Ranges  <5.7              Normal  5.7 - 6.4         Consider Prediabetes  >6.5              Consider Diabetes       HTN: Blood pressure is well controlled on current medications.     HLD: He is on zetia and lipids were at goal. Scheduled for labs next week with Dr. Gavin.     BPH: Patient was started on flomax with resolution of his symptoms. Tolerating well.     Patient's complete Health Risk Assessment and screening values have been reviewed and are found in Flowsheets. The following problems were reviewed today and where indicated follow up appointments were made and/or referrals ordered.    Positive Risk

## 2025-03-19 NOTE — PATIENT INSTRUCTIONS
Learning About Being Active as an Older Adult  Why is being active important as you get older?     Being active is one of the best things you can do for your health. And it's never too late to start. Being active--or getting active, if you aren't already--has definite benefits. It can:  Give you more energy,  Keep your mind sharp.  Improve balance to reduce your risk of falls.  Help you manage chronic illness with fewer medicines.  No matter how old you are, how fit you are, or what health problems you have, there is a form of activity that will work for you. And the more physical activity you can do, the better your overall health will be.  What kinds of activity can help you stay healthy?  Being more active will make your daily activities easier. Physical activity includes planned exercise and things you do in daily life. There are four types of activity:  Aerobic.  Doing aerobic activity makes your heart and lungs strong.  Includes walking, dancing, and gardening.  Aim for at least 2½ hours spread throughout the week.  It improves your energy and can help you sleep better.  Muscle-strengthening.  This type of activity can help maintain muscle and strengthen bones.  Includes climbing stairs, using resistance bands, and lifting or carrying heavy loads.  Aim for at least twice a week.  It can help protect the knees and other joints.  Stretching.  Stretching gives you better range of motion in joints and muscles.  Includes upper arm stretches, calf stretches, and gentle yoga.  Aim for at least twice a week, preferably after your muscles are warmed up from other activities.  It can help you function better in daily life.  Balancing.  This helps you stay coordinated and have good posture.  Includes heel-to-toe walking, cecilia chi, and certain types of yoga.  Aim for at least 3 days a week.  It can reduce your risk of falling.  Even if you have a hard time meeting the recommendations, it's better to be more active

## 2025-03-20 ENCOUNTER — CLINICAL DOCUMENTATION (OUTPATIENT)
Dept: SPIRITUAL SERVICES | Age: 85
End: 2025-03-20

## 2025-03-21 ENCOUNTER — CLINICAL DOCUMENTATION (OUTPATIENT)
Dept: CASE MANAGEMENT | Age: 85
End: 2025-03-21

## 2025-03-21 NOTE — ACP (ADVANCE CARE PLANNING)
his life in both Scenario #1 and #2.  Pt has indicated that he would like to be an organs, eyes and tissues donor and this was reflected on his [place] Advance Directive for Health Care document.     [place] Advance Directive for Health Care document completed with patient during this Advance Care Planning appointment and document sent to patient via [employer] for signature.  Pt will need to review, sign and return the [place] Advance Directive for Health Care document to be uploaded into the medical record.      Outcomes:  ACP discussion completed and New Advance Directive completed    Follow-up Plan:   -Advised patient/agent/surrogate to review completed ACP document and complete a new document per changes in patient's goals, values, care preferences, or best interest of patient.  *This note routed to one or more involved healthcare providers.    Kaylee Patel LCSW, [place]  Advance Care   [facility]  [phone]    Documentation:  I communicated with the patient and/or health care decision maker about ADVANCE CARE PLANNING.     Christopher Rodriguez was seen through a synchronous (real-time) audio encounter. Patient identification was verified at the start of the visit. This visit was conducted with the patient's (and/or legal guardian's) verbal consent.  The patient was located at Home: [address] [place] [phone].  The provider was located at Other: [facility] .  Confirm you are appropriately licensed, registered, or certified to deliver care in the state where the patient is located as indicated above. If you are not or unsure, please re-schedule the visit: Yes, I confirm.     Total time spent for this encounter: 30 minutes      Christopher Rodriguez is a 84 y.o. male evaluated via telephone on 3/21/2025.    Kaylee Patel LCSW    An electronic signature was used to authenticate this note.

## 2025-03-28 ENCOUNTER — CLINICAL DOCUMENTATION (OUTPATIENT)
Dept: CASE MANAGEMENT | Age: 85
End: 2025-03-28

## 2025-04-15 PROBLEM — Z79.4 TYPE 2 DIABETES MELLITUS WITH DIABETIC POLYNEUROPATHY, WITH LONG-TERM CURRENT USE OF INSULIN (HCC): Status: ACTIVE | Noted: 2024-01-31

## 2025-04-15 PROBLEM — G60.9 HEREDITARY AND IDIOPATHIC PERIPHERAL NEUROPATHY: Status: ACTIVE | Noted: 2025-04-15

## 2025-04-16 ENCOUNTER — OFFICE VISIT (OUTPATIENT)
Age: 85
End: 2025-04-16
Payer: MEDICARE

## 2025-04-16 VITALS
HEIGHT: 68 IN | DIASTOLIC BLOOD PRESSURE: 54 MMHG | HEART RATE: 98 BPM | BODY MASS INDEX: 24.6 KG/M2 | TEMPERATURE: 98 F | OXYGEN SATURATION: 96 % | SYSTOLIC BLOOD PRESSURE: 111 MMHG | WEIGHT: 162.3 LBS

## 2025-04-16 DIAGNOSIS — Z79.4 TYPE 2 DIABETES MELLITUS WITH DIABETIC POLYNEUROPATHY, WITH LONG-TERM CURRENT USE OF INSULIN (HCC): Primary | ICD-10-CM

## 2025-04-16 DIAGNOSIS — E11.42 TYPE 2 DIABETES MELLITUS WITH DIABETIC POLYNEUROPATHY, WITH LONG-TERM CURRENT USE OF INSULIN (HCC): Primary | ICD-10-CM

## 2025-04-16 DIAGNOSIS — E11.65 TYPE 2 DIABETES MELLITUS WITH HYPERGLYCEMIA, WITHOUT LONG-TERM CURRENT USE OF INSULIN (HCC): ICD-10-CM

## 2025-04-16 DIAGNOSIS — I10 ESSENTIAL HYPERTENSION: ICD-10-CM

## 2025-04-16 DIAGNOSIS — E78.2 MIXED HYPERLIPIDEMIA: ICD-10-CM

## 2025-04-16 LAB — HBA1C MFR BLD: 6.1 %

## 2025-04-16 PROCEDURE — 83036 HEMOGLOBIN GLYCOSYLATED A1C: CPT | Performed by: INTERNAL MEDICINE

## 2025-04-16 PROCEDURE — 1123F ACP DISCUSS/DSCN MKR DOCD: CPT | Performed by: INTERNAL MEDICINE

## 2025-04-16 PROCEDURE — 99214 OFFICE O/P EST MOD 30 MIN: CPT | Performed by: INTERNAL MEDICINE

## 2025-04-16 PROCEDURE — G2211 COMPLEX E/M VISIT ADD ON: HCPCS | Performed by: INTERNAL MEDICINE

## 2025-04-16 PROCEDURE — 1160F RVW MEDS BY RX/DR IN RCRD: CPT | Performed by: INTERNAL MEDICINE

## 2025-04-16 PROCEDURE — G8420 CALC BMI NORM PARAMETERS: HCPCS | Performed by: INTERNAL MEDICINE

## 2025-04-16 PROCEDURE — G8427 DOCREV CUR MEDS BY ELIG CLIN: HCPCS | Performed by: INTERNAL MEDICINE

## 2025-04-16 PROCEDURE — 1159F MED LIST DOCD IN RCRD: CPT | Performed by: INTERNAL MEDICINE

## 2025-04-16 PROCEDURE — 1036F TOBACCO NON-USER: CPT | Performed by: INTERNAL MEDICINE

## 2025-04-16 PROCEDURE — 3044F HG A1C LEVEL LT 7.0%: CPT | Performed by: INTERNAL MEDICINE

## 2025-04-16 PROCEDURE — 1126F AMNT PAIN NOTED NONE PRSNT: CPT | Performed by: INTERNAL MEDICINE

## 2025-04-16 PROCEDURE — PBSHW AMB POC HEMOGLOBIN A1C: Performed by: INTERNAL MEDICINE

## 2025-04-16 RX ORDER — INSULIN GLARGINE 100 [IU]/ML
10 INJECTION, SOLUTION SUBCUTANEOUS NIGHTLY
Qty: 15 ML | Refills: 3 | Status: SHIPPED | OUTPATIENT
Start: 2025-04-16

## 2025-04-16 NOTE — PROGRESS NOTES
Christopher Rodriguez is a 84 y.o. male here for No chief complaint on file.      1. Have you been to the ER, urgent care clinic since your last visit?  Hospitalized since your last visit? - no    2. Have you seen or consulted any other health care providers outside of the [facility] since your last visit?  Include any pap smears or colon screening.- no

## 2025-04-16 NOTE — PROGRESS NOTES
[facility] DIABETES AND ENDOCRINOLOGY                 Mirella Gavin MD                 Patient Information   Name : Christopher Rodriguez  YOB: 1940             The patient (or guardian, if applicable) and other individuals in attendance with the patient were advised that Artificial Intelligence will be utilized during this visit to record, process the conversation to generate a clinical note, and support improvement of the AI technology. The patient (or guardian, if applicable) and other individuals in attendance at the appointment consented to the use of AI, including the recording.         Christopher Rodriguez is here  for follow-up of  Type 2 Diabetes Mellitus .       Self-referred for evaluation management of type 2 diabetes mellitus.   He was managed by Dr. Tirado in the past  He is on 12 units of Lantus  No hypoglycemia  Morning blood glucose 80-1 20  Lost weight      Fasting blood glucose fluctuates depending on what he eats at night per patient   peripheral neuropathy: Gabapentin did not help, B12 did not help        Prior history      Eating chocolate, ice cream at bedtime,   Diabetes was diagnosed in 2015, complains of tingling, pain in the feet.   Evaluated by Dr. ROLLINS: Has peripheral neuropathy, sciatic radiculopathy, left leg weakness   Qualified for extra benefits based on  disability benefits      Eats 3 meals a day,              Physical Examination:      General: pleasant, no distress, good eye contact  HEENT: no exophthalmos, no periorbital edema, EOMI  Neck: No visible thyromegaly,    RS: Normal respiratory effort  Musculoskeletal: no tremors  Neurological: alert and oriented  Psychiatric: normal mood and affect  Skin: Normal color              Data Reviewed:             Lab Results   Component Value Date    GFRAA >60 06/12/2022        Lab Results   Component Value Date    LABA1C 6.0 (H) 09/06/2024    LABA1C 7.5 (H) 06/06/2023    LABA1C 7.1 (H) 04/05/2022         Lab Results

## 2025-07-14 DIAGNOSIS — R39.12 BENIGN PROSTATIC HYPERPLASIA WITH WEAK URINARY STREAM: ICD-10-CM

## 2025-07-14 DIAGNOSIS — N40.1 BENIGN PROSTATIC HYPERPLASIA WITH WEAK URINARY STREAM: ICD-10-CM

## 2025-07-14 RX ORDER — TAMSULOSIN HYDROCHLORIDE 0.4 MG/1
0.4 CAPSULE ORAL DAILY
Qty: 90 CAPSULE | Refills: 3 | Status: SHIPPED | OUTPATIENT
Start: 2025-07-14

## 2025-08-14 DIAGNOSIS — E11.42 TYPE 2 DIABETES MELLITUS WITH DIABETIC POLYNEUROPATHY, WITH LONG-TERM CURRENT USE OF INSULIN (HCC): Primary | ICD-10-CM

## 2025-08-14 DIAGNOSIS — E78.2 MIXED HYPERLIPIDEMIA: ICD-10-CM

## 2025-08-14 DIAGNOSIS — Z79.4 TYPE 2 DIABETES MELLITUS WITH DIABETIC POLYNEUROPATHY, WITH LONG-TERM CURRENT USE OF INSULIN (HCC): Primary | ICD-10-CM

## 2025-08-14 DIAGNOSIS — I10 ESSENTIAL HYPERTENSION: ICD-10-CM

## 2025-08-15 LAB
BUN SERPL-MCNC: 13 MG/DL (ref 8–27)
BUN/CREAT SERPL: 9 (ref 10–24)
CALCIUM SERPL-MCNC: 9 MG/DL (ref 8.6–10.2)
CHLORIDE SERPL-SCNC: 110 MMOL/L (ref 96–106)
CO2 SERPL-SCNC: 19 MMOL/L (ref 20–29)
CREAT SERPL-MCNC: 1.42 MG/DL (ref 0.76–1.27)
EGFRCR SERPLBLD CKD-EPI 2021: 48 ML/MIN/1.73
EST. AVERAGE GLUCOSE BLD GHB EST-MCNC: 126 MG/DL
GLUCOSE SERPL-MCNC: 139 MG/DL (ref 70–99)
HBA1C MFR BLD: 6 % (ref 4.8–5.6)
LDLC SERPL DIRECT ASSAY-MCNC: 88 MG/DL (ref 0–99)
Lab: NORMAL
POTASSIUM SERPL-SCNC: 4.5 MMOL/L (ref 3.5–5.2)
REPORT: NORMAL
SODIUM SERPL-SCNC: 143 MMOL/L (ref 134–144)

## 2025-08-20 ENCOUNTER — OFFICE VISIT (OUTPATIENT)
Age: 85
End: 2025-08-20
Payer: MEDICARE

## 2025-08-20 VITALS
TEMPERATURE: 98.8 F | OXYGEN SATURATION: 99 % | HEIGHT: 68 IN | HEART RATE: 85 BPM | WEIGHT: 161.1 LBS | DIASTOLIC BLOOD PRESSURE: 67 MMHG | BODY MASS INDEX: 24.41 KG/M2 | SYSTOLIC BLOOD PRESSURE: 138 MMHG

## 2025-08-20 DIAGNOSIS — I10 ESSENTIAL HYPERTENSION: ICD-10-CM

## 2025-08-20 DIAGNOSIS — E55.9 VITAMIN D DEFICIENCY: ICD-10-CM

## 2025-08-20 DIAGNOSIS — E78.2 MIXED HYPERLIPIDEMIA: ICD-10-CM

## 2025-08-20 DIAGNOSIS — Z79.4 TYPE 2 DIABETES MELLITUS WITH DIABETIC POLYNEUROPATHY, WITH LONG-TERM CURRENT USE OF INSULIN (HCC): Primary | ICD-10-CM

## 2025-08-20 DIAGNOSIS — E11.42 TYPE 2 DIABETES MELLITUS WITH DIABETIC POLYNEUROPATHY, WITH LONG-TERM CURRENT USE OF INSULIN (HCC): Primary | ICD-10-CM

## 2025-08-20 PROCEDURE — 1126F AMNT PAIN NOTED NONE PRSNT: CPT | Performed by: INTERNAL MEDICINE

## 2025-08-20 PROCEDURE — 1160F RVW MEDS BY RX/DR IN RCRD: CPT | Performed by: INTERNAL MEDICINE

## 2025-08-20 PROCEDURE — 99214 OFFICE O/P EST MOD 30 MIN: CPT | Performed by: INTERNAL MEDICINE

## 2025-08-20 PROCEDURE — 1123F ACP DISCUSS/DSCN MKR DOCD: CPT | Performed by: INTERNAL MEDICINE

## 2025-08-20 PROCEDURE — G8420 CALC BMI NORM PARAMETERS: HCPCS | Performed by: INTERNAL MEDICINE

## 2025-08-20 PROCEDURE — G8427 DOCREV CUR MEDS BY ELIG CLIN: HCPCS | Performed by: INTERNAL MEDICINE

## 2025-08-20 PROCEDURE — 1036F TOBACCO NON-USER: CPT | Performed by: INTERNAL MEDICINE

## 2025-08-20 PROCEDURE — 3044F HG A1C LEVEL LT 7.0%: CPT | Performed by: INTERNAL MEDICINE

## 2025-08-20 PROCEDURE — 1159F MED LIST DOCD IN RCRD: CPT | Performed by: INTERNAL MEDICINE

## 2025-08-20 PROCEDURE — G2211 COMPLEX E/M VISIT ADD ON: HCPCS | Performed by: INTERNAL MEDICINE

## (undated) DEVICE — SHEET,DRAPE,70X100,STERILE: Brand: MEDLINE

## (undated) DEVICE — SUTURE VCRL SZ 3-0 L27IN ABSRB UD L24MM PS-1 3/8 CIR PRIM J936H

## (undated) DEVICE — 4-PORT MANIFOLD: Brand: NEPTUNE 2

## (undated) DEVICE — GARMENT COMPR M FOR 13IN FT INTMIT SGL BLDR HEM FORC II

## (undated) DEVICE — STRYKER PERFORMANCE SERIES SAGITTAL BLADE: Brand: STRYKER PERFORMANCE SERIES

## (undated) DEVICE — GLOVE SURG SZ 65 THK91MIL LTX FREE SYN POLYISOPRENE

## (undated) DEVICE — SPONGE LAP W18XL18IN WHT COT 4 PLY FLD STRUNG RADPQ DISP ST

## (undated) DEVICE — GLOVE ORANGE PI 7   MSG9070

## (undated) DEVICE — SKIN CLOS DERMABND PRINEO 60CM -- DERMABOUND PRINEO

## (undated) DEVICE — INTENDED FOR TISSUE SEPARATION, AND OTHER PROCEDURES THAT REQUIRE A SHARP SURGICAL BLADE TO PUNCTURE OR CUT.: Brand: BARD-PARKER SAFETY BLADES SIZE 10, STERILE

## (undated) DEVICE — (D)HANDPIECE IRR W/HI FLO TIP -- DUPLICATE USE ITEM 121586

## (undated) DEVICE — SUT VCRL + 2-0 27IN CT2 UD -- 36/BX

## (undated) DEVICE — SUT VCRL + 1 27IN CT1 UD --

## (undated) DEVICE — PIN DRL QUIK HI PERF FOR SIG SYS

## (undated) DEVICE — BNDG,ELSTC,MATRIX,STRL,6"X5YD,LF,HOOK&LP: Brand: MEDLINE

## (undated) DEVICE — BLADE SAW W12.5XL70MM THK1MM RECIP DBL SIDE OFFSET

## (undated) DEVICE — HYPODERMIC SAFETY NEEDLE: Brand: MAGELLAN

## (undated) DEVICE — GOWN SURG XL SMS FAB NONREINFORCED RAGLAN SLV HK LOOP CLSR

## (undated) DEVICE — DRESSING ANTIMIC FOAM OPTIFOAM POSTOP ADH 4X14 IN

## (undated) DEVICE — GOWN,AURORA,FABRIC-REINFORCED,X-LARGE: Brand: MEDLINE

## (undated) DEVICE — COVER,TABLE,HEAVY DUTY,77"X90",STRL: Brand: MEDLINE

## (undated) DEVICE — DRAPE,TOP,102X53,STERILE: Brand: MEDLINE

## (undated) DEVICE — STERILE POLYISOPRENE POWDER-FREE SURGICAL GLOVES WITH EMOLLIENT COATING: Brand: PROTEXIS

## (undated) DEVICE — GLOVE SURG 7 BIOGEL PI ULTRATOUCH G

## (undated) DEVICE — 3M™ IOBAN™ 2 ANTIMICROBIAL INCISE DRAPE 6650EZ: Brand: IOBAN™ 2

## (undated) DEVICE — TOTAL KNEE PACK: Brand: MEDLINE INDUSTRIES, INC.

## (undated) DEVICE — BANDAGE COBAN 4 IN COMPR W4INXL5YD FOAM COHESIVE QUIK STK SELF ADH SFT

## (undated) DEVICE — SUTURE ABSORBABLE BRAIDED 0 CT 36 IN DA UD VICRYL VCP958H

## (undated) DEVICE — ZIMMER® STERILE DISPOSABLE TOURNIQUET CUFF WITH PLC, DUAL PORT, SINGLE BLADDER, 34 IN. (86 CM)

## (undated) DEVICE — GUIDEPIN ORTH THRD HI PERF HD SIG

## (undated) DEVICE — PREP SKN CHLRAPRP APL 26ML STR --

## (undated) DEVICE — BLADE ELECTRODE: Brand: VALLEYLAB

## (undated) DEVICE — SOL IRR NACL 0.9% 500ML POUR --

## (undated) DEVICE — HOOD, PEEL-AWAY: Brand: FLYTE

## (undated) DEVICE — 450 ML BOTTLE OF 0.05% CHLORHEXIDINE GLUCONATE IN 99.95% STERILE WATER FOR IRRIGATION, USP AND APPLICATOR.: Brand: IRRISEPT ANTIMICROBIAL WOUND LAVAGE

## (undated) DEVICE — GLOVE SURG UNDERGLOVE 7.5 PF BLU BIOGEL PI MIC LF

## (undated) DEVICE — WRAP KNEE UNIV E STRP HK AND LOOP W/ GEL PK MEDCOOL

## (undated) DEVICE — GLOVE SURG SZ 75 L12IN FNGR THK79MIL GRN LTX FREE

## (undated) DEVICE — 3M™ TEGADERM™ HP TRANSPARENT FILM DRESSING FRAME STYLE, 9546HP, 4 IN X 4-1/2 IN (10 CM X 11.5 CM), 50/CT 4CT/CASE: Brand: 3M™ TEGADERM™

## (undated) DEVICE — 3 BONE CEMENT MIXER: Brand: MIXEVAC

## (undated) DEVICE — GLOVE ORANGE PI 8 1/2   MSG9085

## (undated) DEVICE — POWDER HEMOSTAT GEL 3.0GR -- SURGICEL